# Patient Record
Sex: FEMALE | Race: BLACK OR AFRICAN AMERICAN | Employment: OTHER | URBAN - METROPOLITAN AREA
[De-identification: names, ages, dates, MRNs, and addresses within clinical notes are randomized per-mention and may not be internally consistent; named-entity substitution may affect disease eponyms.]

---

## 2019-09-18 ENCOUNTER — DOCUMENTATION ONLY (OUTPATIENT)
Dept: ONCOLOGY | Age: 64
End: 2019-09-18

## 2019-09-18 ENCOUNTER — OFFICE VISIT (OUTPATIENT)
Dept: ONCOLOGY | Age: 64
End: 2019-09-18

## 2019-09-18 ENCOUNTER — OFFICE VISIT (OUTPATIENT)
Dept: SURGERY | Age: 64
End: 2019-09-18

## 2019-09-18 VITALS
OXYGEN SATURATION: 95 % | WEIGHT: 154 LBS | HEART RATE: 121 BPM | SYSTOLIC BLOOD PRESSURE: 144 MMHG | BODY MASS INDEX: 25.66 KG/M2 | RESPIRATION RATE: 18 BRPM | HEIGHT: 65 IN | DIASTOLIC BLOOD PRESSURE: 88 MMHG

## 2019-09-18 VITALS
RESPIRATION RATE: 16 BRPM | TEMPERATURE: 97 F | OXYGEN SATURATION: 98 % | HEIGHT: 65 IN | SYSTOLIC BLOOD PRESSURE: 119 MMHG | BODY MASS INDEX: 25.66 KG/M2 | WEIGHT: 154 LBS | HEART RATE: 80 BPM | DIASTOLIC BLOOD PRESSURE: 70 MMHG

## 2019-09-18 DIAGNOSIS — M79.89 SOFT TISSUE MASS: Primary | ICD-10-CM

## 2019-09-18 DIAGNOSIS — R22.42 LEG MASS, LEFT: Primary | ICD-10-CM

## 2019-09-18 PROBLEM — R18.8 ASCITES: Status: ACTIVE | Noted: 2019-03-27

## 2019-09-18 PROBLEM — Z99.81 OXYGEN DEPENDENT: Status: ACTIVE | Noted: 2018-12-29

## 2019-09-18 PROBLEM — R74.01 TRANSAMINITIS: Status: ACTIVE | Noted: 2019-02-08

## 2019-09-18 PROBLEM — F10.10 ETOH ABUSE: Status: ACTIVE | Noted: 2019-09-18

## 2019-09-18 PROBLEM — F31.9 BIPOLAR 1 DISORDER (HCC): Status: ACTIVE | Noted: 2019-09-18

## 2019-09-18 PROBLEM — F33.2 MAJOR DEPRESSIVE DISORDER, RECURRENT SEVERE WITHOUT PSYCHOTIC FEATURES (HCC): Status: ACTIVE | Noted: 2018-12-20

## 2019-09-18 PROBLEM — M51.9 LUMBAR DISC DISEASE: Status: ACTIVE | Noted: 2019-09-18

## 2019-09-18 PROBLEM — K76.82 HEPATIC ENCEPHALOPATHY: Status: ACTIVE | Noted: 2019-09-18

## 2019-09-18 PROBLEM — K02.9 TEETH DECAYED: Status: ACTIVE | Noted: 2019-03-27

## 2019-09-18 PROBLEM — F51.04 PSYCHOPHYSIOLOGICAL INSOMNIA: Status: ACTIVE | Noted: 2018-12-20

## 2019-09-18 PROBLEM — B18.2 CHRONIC HEPATITIS C (HCC): Status: ACTIVE | Noted: 2019-09-18

## 2019-09-18 PROBLEM — R45.851 SUICIDAL IDEATION: Status: ACTIVE | Noted: 2018-12-20

## 2019-09-18 PROBLEM — M62.89: Status: ACTIVE | Noted: 2019-02-08

## 2019-09-18 PROBLEM — M15.9 DJD (DEGENERATIVE JOINT DISEASE), MULTIPLE SITES: Status: ACTIVE | Noted: 2019-09-18

## 2019-09-18 PROBLEM — R73.9 ELEVATED BLOOD SUGAR: Status: ACTIVE | Noted: 2019-09-18

## 2019-09-18 PROBLEM — D72.829 LEUKOCYTOSIS: Status: ACTIVE | Noted: 2019-09-18

## 2019-09-18 PROBLEM — J43.9 PULMONARY EMPHYSEMA (HCC): Status: ACTIVE | Noted: 2019-09-18

## 2019-09-18 PROBLEM — R57.8 HEMORRHAGIC SHOCK (HCC): Status: ACTIVE | Noted: 2019-02-08

## 2019-09-18 PROBLEM — F43.10 PTSD (POST-TRAUMATIC STRESS DISORDER): Status: ACTIVE | Noted: 2018-12-20

## 2019-09-18 PROBLEM — I47.1 SVT (SUPRAVENTRICULAR TACHYCARDIA) (HCC): Status: ACTIVE | Noted: 2019-03-27

## 2019-09-18 PROBLEM — K74.60 CIRRHOSIS OF LIVER (HCC): Status: ACTIVE | Noted: 2019-09-18

## 2019-09-18 RX ORDER — BUPRENORPHINE HYDROCHLORIDE, NALOXONE HYDROCHLORIDE 8; 2 MG/1; MG/1
FILM, SOLUBLE BUCCAL; SUBLINGUAL
Refills: 0 | COMMUNITY
Start: 2019-08-29

## 2019-09-18 RX ORDER — LANOLIN ALCOHOL/MO/W.PET/CERES
CREAM (GRAM) TOPICAL DAILY
COMMUNITY

## 2019-09-18 RX ORDER — BUSPIRONE HYDROCHLORIDE 7.5 MG/1
7.5 TABLET ORAL
COMMUNITY
Start: 2019-06-10

## 2019-09-18 RX ORDER — OMEPRAZOLE 40 MG/1
40 CAPSULE, DELAYED RELEASE ORAL DAILY
COMMUNITY

## 2019-09-18 RX ORDER — MONTELUKAST SODIUM 10 MG/1
10 TABLET ORAL DAILY
COMMUNITY

## 2019-09-18 RX ORDER — ALBUTEROL SULFATE 90 UG/1
2 AEROSOL, METERED RESPIRATORY (INHALATION)
COMMUNITY

## 2019-09-18 RX ORDER — SPIRONOLACTONE 25 MG/1
TABLET ORAL DAILY
COMMUNITY

## 2019-09-18 RX ORDER — IBUPROFEN 200 MG
1 TABLET ORAL EVERY 24 HOURS
COMMUNITY
End: 2021-01-01

## 2019-09-18 RX ORDER — NAPROXEN 500 MG/1
500 TABLET ORAL 2 TIMES DAILY WITH MEALS
COMMUNITY

## 2019-09-18 RX ORDER — ERGOCALCIFEROL 1.25 MG/1
CAPSULE ORAL
Refills: 1 | COMMUNITY
Start: 2019-08-24

## 2019-09-18 RX ORDER — HYDROXYZINE PAMOATE 25 MG/1
25 CAPSULE ORAL
COMMUNITY

## 2019-09-18 RX ORDER — BUPROPION HYDROCHLORIDE 150 MG/1
TABLET ORAL
Refills: 3 | COMMUNITY
Start: 2019-09-07

## 2019-09-18 RX ORDER — LACTULOSE 10 G/10G
10 SOLUTION ORAL 3 TIMES DAILY
COMMUNITY

## 2019-09-18 RX ORDER — BUPRENORPHINE AND NALOXONE 8; 2 MG/1; MG/1
1 FILM, SOLUBLE BUCCAL; SUBLINGUAL
COMMUNITY
Start: 2019-02-18

## 2019-09-18 RX ORDER — FOLIC ACID 1 MG/1
TABLET ORAL DAILY
COMMUNITY

## 2019-09-18 NOTE — PROGRESS NOTES
Hematology/Oncology Consultation Note Name: Carroll Lund Date: 2019 : 1955 No primary care provider on file. Ms. Kyle More  is a 75 y.o. -American woman who was referred for evaluation of left gluteal mass. Subjective: Chief complaint: Gluteal mass History of present illness: Ms. Kyle More is a 66-year-old -American woman who states that about a year ago she noticed a small masslike lesion over her left gluteal area. She did not think much of it. However over the past few months it has started to enlarge in size. In 2019 she had an ultrasound of the area and there was no evidence of a cystic component the area was described as heterogeneous measuring about 6.8 x 4.85 8.5 cm. An attempt to aspirate fluid from the left posterior hip/gluteal mass was unsuccessful. More recently she states that the mass has actually doubled in size and is now about the size of a softball. She is referred here for an assessment to help define whether or not we are dealing with a sarcoma versus a benign entity. The area is beginning to cause some pain. The patient reports that she suffers from long-standing COPD and currently receives screening CT scans in an effort to assess for any evidence of lung cancer development. So for she has no evidence of malignancy otherwise. Past Medical History:  
Diagnosis Date  Acid reflux  Anxiety  Bipolar 1 disorder ([address]  Bronchitis  Cirrhosis ([address]  Coughing  Depression  DVT (deep vein thrombosis) in pregnancy ([address]  ETOH abuse  Gastritis  Hepatic encephalopathy ([address]  Hepatitis  Joint pain  Leukocytosis  Lumbar disc disease  Shortness of breath  Stress  Teeth decayed  Vitamin D deficiency  Weakness of left leg Allergies Allergen Reactions  Aspirin Nausea and Vomiting Past Surgical History:  
Procedure Laterality Date  EGD  HAND/FINGER SURGERY UNLISTED  HX BACK SURGERY    
 HX OVARIAN CYST REMOVAL    
 HX SPLENECTOMY Social History Socioeconomic History  Marital status:  Spouse name: Not on file  Number of children: Not on file  Years of education: Not on file  Highest education level: Not on file Occupational History  Not on file Social Needs  Financial resource strain: Not on file  Food insecurity:  
  Worry: Not on file Inability: Not on file  Transportation needs:  
  Medical: Not on file Non-medical: Not on file Tobacco Use  Smoking status: Current Every Day Smoker  Smokeless tobacco: Never Used Substance and Sexual Activity  Alcohol use: Not Currently  Drug use: Yes Types: Cocaine, Heroin  Sexual activity: Yes Lifestyle  Physical activity:  
  Days per week: Not on file Minutes per session: Not on file  Stress: Not on file Relationships  Social connections:  
  Talks on phone: Not on file Gets together: Not on file Attends Jainism service: Not on file Active member of club or organization: Not on file Attends meetings of clubs or organizations: Not on file Relationship status: Not on file  Intimate partner violence:  
  Fear of current or ex partner: Not on file Emotionally abused: Not on file Physically abused: Not on file Forced sexual activity: Not on file Other Topics Concern  Not on file Social History Narrative  Not on file Family History Problem Relation Age of Onset  Heart Disease Mother  Stroke Mother  Heart Disease Father  Heart Disease Brother  Hypertension Brother Current Outpatient Medications Medication Sig Dispense Refill  albuterol (PROVENTIL HFA, VENTOLIN HFA, PROAIR HFA) 90 mcg/actuation inhaler Take 2 Puffs by inhalation.  buprenorphine-naloxone (SUBOXONE) 8-2 mg film sublingaul film 1 Each by SubLINGual route.  SUBOXONE 8-2 mg film sublingaul film DISSOLVE 1 FILM UNDER THE TONGUE TWICE DAILY  0  
 buPROPion XL (WELLBUTRIN XL) 150 mg tablet TAKE 1 TABLET BY MOUTH EVERY DAY  3  
 busPIRone (BUSPAR) 7.5 mg tablet Take 7.5 mg by mouth.  ergocalciferol (ERGOCALCIFEROL) 50,000 unit capsule TAKE 1 CAP BY MOUTH EVERY FRIDAY. 1  
 Omega-3 Fatty Acids (FISH OIL) 500 mg cap Take  by mouth.  folic acid (FOLVITE) 1 mg tablet Take  by mouth daily.  hydrOXYzine pamoate (VISTARIL) 25 mg capsule Take 25 mg by mouth three (3) times daily as needed for Itching.  albuterol 2.5 mg /3 mL (0.083 %) nebu 3 mL, albuterol-ipratropium 2.5 mg-0.5 mg/3 ml nebu 3 mL 1 Dose by Nebulization route.  lactulose (KRISTALOSE) 10 gram packet Take 10 g by mouth three (3) times daily.  montelukast (SINGULAIR) 10 mg tablet Take 10 mg by mouth daily.  naproxen (NAPROSYN) 500 mg tablet Take 500 mg by mouth two (2) times daily (with meals).  nicotine (NICODERM CQ) 21 mg/24 hr 1 Patch by TransDERmal route every twenty-four (24) hours.  omeprazole (PRILOSEC) 40 mg capsule Take 40 mg by mouth daily.  spironolactone (ALDACTONE) 25 mg tablet Take  by mouth daily.  thiamine HCL (VITAMIN B-1) 100 mg tablet Take  by mouth daily.  tiotropium (SPIRIVA WITH HANDIHALER) 18 mcg inhalation capsule Take 1 Cap by inhalation daily. Review of Systems General ROS:The patient has no complaints and there is no physical distress evident. Psychological ROS: patient denies having any psychological symptoms such as hallucinations, depression or anxiety. Ophthalmic ROS:the patient denies having any visual impairment or eye discomfort. ENT ROS: there are no abnormalities reported. Allergy and Immunology ROS:the patient denies having any seasonal allergies or allergies to medications other than those already outlined above.  
Hematological and Lymphatic ROS: the patient denies having any bruising, bleeding or lymphadenopathy. Endocrine ROS: the patient denies having any heat or cold intolerance. There is no history of diabetes or thyroid disorders. Breast ROS: the patient denies having any history of breast mass, nipple discharge, or lumps. Respiratory ROS:the patient denies having any cough, shortness of breath, or dyspnea on exertion. Cardiovascular ROS: there are no complaints of chest pain, palpitations, chest pounding, or dyspnea on exertion. Gastrointestinal ROS: the patient denies having nausea, emesis, diarrhea, constipation, or blood in the stool. Genito-Urinary ROS: the patient denies having urinary urgency, frequency, or dysuria. Musculoskeletal ROS: The patient is complaining of a large soft pulsatile mass on the left posterior hip/gluteal area. Neurological ROS: the patient denies having any numbness, tingling, or neurologic deficits. Dermatological ROS:patient denies having any unexplained rash, skin ulcerations, or hives. Objective:  
 
Visit Vitals /70 Pulse 80 Temp 97 °F (36.1 °C) (Oral) Resp 16 Ht 5' 5\" (1.651 m) Wt 69.9 kg (154 lb) SpO2 98% BMI 25.63 kg/m² ECOGPS=1; pain score=2/10 Physical Exam:  
Gen. Appearance: the patient is in no acute distress. Skin: There is no evidence of bruise or rash. HEENT: The head is normocephalic and atraumatic. The conjunctiva and sclera are clear. Pupils are equal, round, reactive to light, and accommodation. The extraocular movements are intact. ENT reveals no oral mucosal lesions or ulcerations. Neck: Supple without lymphadenopathy or thyromegaly. Lungs: Clear to auscultation and percussion; there are no wheezes or rhonchi. The patient is currently using supplemental oxygen. Heart: Regular rate and rhythm; there are no murmurs, gallops, or rubs. Abdomen:  Bowel sounds are present and normal.  Musculoskeletal: There is a 8 cm x 7 cm soft tissue gluteal area.  There is focal tenderness to firm palpation. There is no guarding, tenderness, or hepatosplenomegaly. Extremities: There is no clubbing, cyanosis, or edema. Neurologic: There are no focal neurologic deficits. Lymphatics: There is no palpable peripheral lymphadenopathy. Lab data: 
From 9/16/2019 and ultrasound of the soft tissue mass of the left gluteal area now measures 6.8 x 4.8 x 8.5 cm. The size has increased since the prior ultrasound. From 4/6/2019 and ultrasound-guided fine-needle aspiration of the left posterior hip/pleural mass was attempted and the test was negative for fluid. CT lung screening low-dose scan was completed on 8/29/2019. There was no suspicious pulmonary nodules/masses. The test was negative. Assessment:  
Soft tissue mass, left gluteal area: I have explained to the patient that this may represent a benign process versus a malignant sarcoma. Plan:  
Soft tissue mass, left gluteal area: At this point I am recommending an MRI of the left gluteal area for further assessment of the mass. She will be referred to the surgeon, Dr. Kana Sanchez, for an assessment to see if he can obtain tissue biopsy for diagnosis. Follow-up in 3 weeks. Orders Placed This Encounter  MRI FEMUR LT W WO CONT Mri of left thigh and left leg Standing Status:   Future Standing Expiration Date:   10/18/2020 Order Specific Question:   Is Patient Allergic to Contrast Dye? Answer:   No  
  Order Specific Question:   STAT Creatinine as indicated Answer: Yes  CBC WITH AUTOMATED DIFF Standing Status:   Future Standing Expiration Date:   9/18/2020  METABOLIC PANEL, COMPREHENSIVE Standing Status:   Future Standing Expiration Date:   9/18/2020  SED RATE (ESR) Standing Status:   Future Standing Expiration Date:   9/18/2020  CBC WITH AUTOMATED DIFF Standing Status:   Future Standing Expiration Date:   9/18/2020  albuterol (PROVENTIL HFA, VENTOLIN HFA, PROAIR HFA) 90 mcg/actuation inhaler Sig: Take 2 Puffs by inhalation.  buprenorphine-naloxone (SUBOXONE) 8-2 mg film sublingaul film Si Each by SubLINGual route. Daylin Varela  SUBOXONE 8-2 mg film sublingaul film Sig: DISSOLVE 1 FILM UNDER THE TONGUE TWICE DAILY Refill:  0  
  .  
 buPROPion XL (WELLBUTRIN XL) 150 mg tablet Sig: TAKE 1 TABLET BY MOUTH EVERY DAY Refill:  3  
 busPIRone (BUSPAR) 7.5 mg tablet Sig: Take 7.5 mg by mouth.  ergocalciferol (ERGOCALCIFEROL) 50,000 unit capsule Sig: TAKE 1 CAP BY MOUTH EVERY FRIDAY. Refill:  1  
 Omega-3 Fatty Acids (FISH OIL) 500 mg cap Sig: Take  by mouth.  folic acid (FOLVITE) 1 mg tablet Sig: Take  by mouth daily.  hydrOXYzine pamoate (VISTARIL) 25 mg capsule Sig: Take 25 mg by mouth three (3) times daily as needed for Itching.  albuterol 2.5 mg /3 mL (0.083 %) nebu 3 mL, albuterol-ipratropium 2.5 mg-0.5 mg/3 ml nebu 3 mL Si Dose by Nebulization route.  lactulose (KRISTALOSE) 10 gram packet Sig: Take 10 g by mouth three (3) times daily.  montelukast (SINGULAIR) 10 mg tablet Sig: Take 10 mg by mouth daily.  naproxen (NAPROSYN) 500 mg tablet Sig: Take 500 mg by mouth two (2) times daily (with meals).  nicotine (NICODERM CQ) 21 mg/24 hr  
  Si Patch by TransDERmal route every twenty-four (24) hours.  omeprazole (PRILOSEC) 40 mg capsule Sig: Take 40 mg by mouth daily.  spironolactone (ALDACTONE) 25 mg tablet Sig: Take  by mouth daily.  thiamine HCL (VITAMIN B-1) 100 mg tablet Sig: Take  by mouth daily.  tiotropium (SPIRIVA WITH HANDIHALER) 18 mcg inhalation capsule Sig: Take 1 Cap by inhalation daily. Mikala Reyes MD 
2019 Please note: This document has been produced using voice recognition software. Unrecognized errors in transcription may be present.

## 2019-09-18 NOTE — PATIENT INSTRUCTIONS
Sarcoma: Care Instructions Your Care Instructions Sarcoma is cancer of certain tissues of the body, such as the muscles, connective tissues (like tendons), blood vessels, bones, and fat. Cancer occurs when abnormal cells grow out of control. The cells can spread to other areas of the body. Sarcoma is a general name for several rare cancers that occur in these tissues. Doctors treat this type of cancer with surgery, radiation, or medicines (chemotherapy). Your doctor will treat you based on how quickly or slowly the type of cancer you have may spread, how far the cancer has spread, and your overall health. One or more treatments may be used. When you find out that you have cancer, you may feel many emotions and may need some help coping. Seek out family, friends, and counselors for support. You also can do things at home to make yourself feel better while you go through treatment. Call the [facility] (9-781.675.1978) or visit its website at [employer] for more information. Follow-up care is a key part of your treatment and safety. Be sure to make and go to all appointments, and call your doctor if you are having problems. It's also a good idea to know your test results and keep a list of the medicines you take. How can you care for yourself at home? · Take your medicines exactly as prescribed. Call your doctor if you think you are having a problem with your medicine. You may get medicine for nausea and vomiting if you have these side effects. · Eat healthy food. If you do not feel like eating, try to eat food that has protein and extra calories to keep up your strength and prevent weight loss. Drink liquid meal replacements for extra calories and protein. Try to eat your main meal early. · Get some physical activity every day, but do not get too tired. Keep doing the hobbies you enjoy as your energy allows. · Take steps to control your stress and workload. Learn relaxation techniques. ? Share your feelings. Stress and tension affect our emotions. By expressing your feelings to others, you may be able to understand and cope with them. ? Consider joining a support group. Talking about a problem with your spouse, a good friend, or other people with similar problems is a good way to reduce tension and stress. ? Express yourself through art. Try writing, crafts, dance, or art to relieve stress. Some dance, writing, or art groups may be available just for people who have cancer. ? Be kind to your body and mind. Getting enough sleep, eating a healthy diet, and taking time to do things you enjoy can contribute to an overall feeling of balance in your life and help reduce stress. ? Get help if you need it. Discuss your concerns with your doctor or counselor. · If you are vomiting or have diarrhea: ? Drink plenty of fluids (enough so that your urine is light yellow or clear like water) to prevent dehydration. Choose water and other caffeine-free clear liquids. If you have kidney, heart, or liver disease and have to limit fluids, talk with your doctor before you increase the amount of fluids you drink. ? When you are able to eat, try clear soups, mild foods, and liquids until all symptoms are gone for 12 to 48 hours. Other good choices include dry toast, crackers, cooked cereal, and gelatin dessert, such as Jell-O. · If you have not already done so, prepare a list of advance directives. Advance directives are instructions to your doctor and family members about what kind of care you want if you become unable to speak or express yourself. When should you call for help? Call 911 anytime you think you may need emergency care. For example, call if: 
  · You passed out (lost consciousness).  
 Call your doctor now or seek immediate medical care if: 
  · You have a fever.  
  · You have abnormal bleeding.  
  · You have new or worse pain.  
  · You think you have an infection.   · You have new symptoms, such as a cough, belly pain, vomiting, diarrhea, or a rash.  
 Watch closely for changes in your health, and be sure to contact your doctor if: 
  · You are much more tired than usual.  
  · You have swollen glands in your armpits, groin, or neck.  
  · You do not get better as expected. Where can you learn more? Go to http://laura-lucia.info/. Enter Reina Zaragoza in the search box to learn more about \"Sarcoma: Care Instructions. \" Current as of: December 19, 2018 Content Version: 12.1 © 0231-4999 [employer]. Care instructions adapted under license by [employer] (which disclaims liability or warranty for this information). If you have questions about a medical condition or this instruction, always ask your healthcare professional. [address] any warranty or liability for your use of this information.

## 2019-09-18 NOTE — PROGRESS NOTES
Progress Note    Patient: Lynn Mena  MRN: [id number]  SSN: [id number]   YOB: 1955  Age: 75 y.o. Sex: female     Chief Complaint   Patient presents with    Mass     left leg       HPI    Ms. Rick Wall is a 20-year-old significantly ill black woman with history of Bipolar disorder hepatitis C severe COPD requiring home oxygen and hepatic cirrhosis. She is here because a moderate to large size left gluteal mass was found. No imaging was done so far on this and she needs that prior to any surgical advice. She is short of breath in the exam room and has to plug her home oxygen and to get relief. To me this makes her essentially a nonsurgical candidate. She is due for an MRI ordered by Dr. Zbigniew Frias her medical oncologist.  We will see what this shows and see her back at that time so I could advise her on the best course of action. Past Medical History:   Diagnosis Date    Acid reflux     Anxiety     Bipolar 1 disorder (HCC)     Bronchitis     Cirrhosis (HCC)     Coughing     Depression     DVT (deep vein thrombosis) in pregnancy (HCC)     ETOH abuse     Gastritis     Hepatic encephalopathy (HCC)     Hepatitis     Joint pain     Leukocytosis     Lumbar disc disease     Shortness of breath     Stress     Teeth decayed     Vitamin D deficiency     Weakness of left leg      Past Surgical History:   Procedure Laterality Date    EGD      HAND/FINGER SURGERY UNLISTED      HX BACK SURGERY      HX OVARIAN CYST REMOVAL      HX SPLENECTOMY       Allergies   Allergen Reactions    Aspirin Nausea and Vomiting     Current Outpatient Medications   Medication Sig Dispense Refill    albuterol (PROVENTIL HFA, VENTOLIN HFA, PROAIR HFA) 90 mcg/actuation inhaler Take 2 Puffs by inhalation.  buprenorphine-naloxone (SUBOXONE) 8-2 mg film sublingaul film 1 Each by SubLINGual route.       SUBOXONE 8-2 mg film sublingaul film DISSOLVE 1 FILM UNDER THE TONGUE TWICE DAILY  0    buPROPion XL (WELLBUTRIN XL) 150 mg tablet TAKE 1 TABLET BY MOUTH EVERY DAY  3    busPIRone (BUSPAR) 7.5 mg tablet Take 7.5 mg by mouth.  ergocalciferol (ERGOCALCIFEROL) 50,000 unit capsule TAKE 1 CAP BY MOUTH EVERY FRIDAY. 1    Omega-3 Fatty Acids (FISH OIL) 500 mg cap Take  by mouth.  folic acid (FOLVITE) 1 mg tablet Take  by mouth daily.  hydrOXYzine pamoate (VISTARIL) 25 mg capsule Take 25 mg by mouth three (3) times daily as needed for Itching.  albuterol 2.5 mg /3 mL (0.083 %) nebu 3 mL, albuterol-ipratropium 2.5 mg-0.5 mg/3 ml nebu 3 mL 1 Dose by Nebulization route.  lactulose (KRISTALOSE) 10 gram packet Take 10 g by mouth three (3) times daily.  montelukast (SINGULAIR) 10 mg tablet Take 10 mg by mouth daily.  naproxen (NAPROSYN) 500 mg tablet Take 500 mg by mouth two (2) times daily (with meals).  nicotine (NICODERM CQ) 21 mg/24 hr 1 Patch by TransDERmal route every twenty-four (24) hours.  omeprazole (PRILOSEC) 40 mg capsule Take 40 mg by mouth daily.  spironolactone (ALDACTONE) 25 mg tablet Take  by mouth daily.  thiamine HCL (VITAMIN B-1) 100 mg tablet Take  by mouth daily.  tiotropium (SPIRIVA WITH HANDIHALER) 18 mcg inhalation capsule Take 1 Cap by inhalation daily.        Social History     Socioeconomic History    Marital status:      Spouse name: Not on file    Number of children: Not on file    Years of education: Not on file    Highest education level: Not on file   Occupational History    Not on file   Social Needs    Financial resource strain: Not on file    Food insecurity:     Worry: Not on file     Inability: Not on file    Transportation needs:     Medical: Not on file     Non-medical: Not on file   Tobacco Use    Smoking status: Current Every Day Smoker    Smokeless tobacco: Never Used   Substance and Sexual Activity    Alcohol use: Not Currently    Drug use: Yes     Types: Cocaine, Heroin    Sexual activity: Yes Lifestyle    Physical activity:     Days per week: Not on file     Minutes per session: Not on file    Stress: Not on file   Relationships    Social connections:     Talks on phone: Not on file     Gets together: Not on file     Attends Muslim service: Not on file     Active member of club or organization: Not on file     Attends meetings of clubs or organizations: Not on file     Relationship status: Not on file    Intimate partner violence:     Fear of current or ex partner: Not on file     Emotionally abused: Not on file     Physically abused: Not on file     Forced sexual activity: Not on file   Other Topics Concern    Not on file   Social History Narrative    Not on file     Family History   Problem Relation Age of Onset    Heart Disease Mother     Stroke Mother     Heart Disease Father     Heart Disease Brother     Hypertension Brother          Review of systems:  Patient denies any reflux, emesis, abdominal pain, change in bowel habits, hematochezia, melena, fever, weight loss, fatigue chills, dermatitis, abnormal moles, change in vision, vertigo, epistaxis, dysphagia, hoarseness, chest pain, palpitations, hypertension, edema, cough, shortness of breath, wheezing, hemoptysis, snoring, hematuria, diabetes, thyroid disease, anemia, bruising, history of blood transfusion, dizziness, headache, or fainting.     Physical Examination    Well developed well nourished female in no apparent distress  Visit Vitals  /88   Pulse (!) 121   Resp 18   Ht 5' 5\" (1.651 m)   Wt 69.9 kg (154 lb)   SpO2 95%   BMI 25.63 kg/m²      Head: normocephalic, atraumatic  Mouth: Clear, no overt lesions, oral mucosa pink and moist  Neck: supple, no masses, no adenopathy or carotid bruits, trachea midline  Resp: clear to auscultation bilaterally, no wheeze, rhonchi or rales, excursions normal and symmetrical  Cardio: Regular rate and rhythm, no murmurs, clicks, gallops or rubs, no edema or varicosities  Abdomen: soft, nontender, nondistended, normoactive bowel sounds, no hernias, no hepatosplenomegaly,   Back: Fairly large 6 x 8 cm mass around her left hip and buttock area that is palpable little deep. Extremeties: warm, well-perfused, no tenderness or swelling, normal gait/station  Neuro: sensation and strength grossly intact and symmetrical  Psych: alert and oriented to person, place and time  Breast exam deferred    IMPRESSION  Highly suspicious left pelvic/buttock mass awaiting MRI    PLAN  No orders of the defined types were placed in this encounter.     Follow-up after MRI  August Metzger MD

## 2019-09-19 LAB
ALBUMIN SERPL-MCNC: 3.3 G/DL (ref 3.6–4.8)
ALBUMIN/GLOB SERPL: 0.6 {RATIO} (ref 1.2–2.2)
ALP SERPL-CCNC: 177 IU/L (ref 39–117)
ALT SERPL-CCNC: 17 IU/L (ref 0–32)
AST SERPL-CCNC: 30 IU/L (ref 0–40)
BASOPHILS # BLD AUTO: 0.1 X10E3/UL (ref 0–0.2)
BASOPHILS NFR BLD AUTO: 1 %
BILIRUB SERPL-MCNC: 0.7 MG/DL (ref 0–1.2)
BUN SERPL-MCNC: 5 MG/DL (ref 8–27)
BUN/CREAT SERPL: 8 (ref 12–28)
CALCIUM SERPL-MCNC: 9.6 MG/DL (ref 8.7–10.3)
CHLORIDE SERPL-SCNC: 93 MMOL/L (ref 96–106)
CO2 SERPL-SCNC: 26 MMOL/L (ref 20–29)
CREAT SERPL-MCNC: 0.66 MG/DL (ref 0.57–1)
EOSINOPHIL # BLD AUTO: 0.7 X10E3/UL (ref 0–0.4)
EOSINOPHIL NFR BLD AUTO: 5 %
ERYTHROCYTE [DISTWIDTH] IN BLOOD BY AUTOMATED COUNT: 13.8 % (ref 12.3–15.4)
ERYTHROCYTE [SEDIMENTATION RATE] IN BLOOD BY WESTERGREN METHOD: 55 MM/HR (ref 0–40)
GLOBULIN SER CALC-MCNC: 5.5 G/DL (ref 1.5–4.5)
GLUCOSE SERPL-MCNC: 106 MG/DL (ref 65–99)
HCT VFR BLD AUTO: 40.7 % (ref 34–46.6)
HGB BLD-MCNC: 14.1 G/DL (ref 11.1–15.9)
IMM GRANULOCYTES # BLD AUTO: 0 X10E3/UL (ref 0–0.1)
IMM GRANULOCYTES NFR BLD AUTO: 0 %
LYMPHOCYTES # BLD AUTO: 2.7 X10E3/UL (ref 0.7–3.1)
LYMPHOCYTES NFR BLD AUTO: 21 %
MCH RBC QN AUTO: 31.3 PG (ref 26.6–33)
MCHC RBC AUTO-ENTMCNC: 34.6 G/DL (ref 31.5–35.7)
MCV RBC AUTO: 90 FL (ref 79–97)
MONOCYTES # BLD AUTO: 0.9 X10E3/UL (ref 0.1–0.9)
MONOCYTES NFR BLD AUTO: 7 %
NEUTROPHILS # BLD AUTO: 8.6 X10E3/UL (ref 1.4–7)
NEUTROPHILS NFR BLD AUTO: 66 %
PLATELET # BLD AUTO: 759 X10E3/UL (ref 150–450)
POTASSIUM SERPL-SCNC: 4.7 MMOL/L (ref 3.5–5.2)
PROT SERPL-MCNC: 8.8 G/DL (ref 6–8.5)
RBC # BLD AUTO: 4.5 X10E6/UL (ref 3.77–5.28)
SODIUM SERPL-SCNC: 134 MMOL/L (ref 134–144)
WBC # BLD AUTO: 12.8 X10E3/UL (ref 3.4–10.8)

## 2019-09-27 ENCOUNTER — HOSPITAL ENCOUNTER (OUTPATIENT)
Age: 64
Discharge: HOME | End: 2019-09-27
Attending: INTERNAL MEDICINE
Payer: MEDICARE

## 2019-09-27 DIAGNOSIS — R22.42 LEG MASS, LEFT: ICD-10-CM

## 2019-09-27 PROCEDURE — A9575 INJ GADOTERATE MEGLUMI 0.1ML: HCPCS | Performed by: INTERNAL MEDICINE

## 2019-09-27 PROCEDURE — 73720 MRI LWR EXTREMITY W/O&W/DYE: CPT

## 2019-09-27 PROCEDURE — 74011636320 HC RX REV CODE- 636/320: Performed by: INTERNAL MEDICINE

## 2019-09-27 RX ADMIN — GADOTERATE MEGLUMINE 15 ML: 376.9 INJECTION INTRAVENOUS at 12:00

## 2019-10-09 ENCOUNTER — OFFICE VISIT (OUTPATIENT)
Dept: ONCOLOGY | Age: 64
End: 2019-10-09

## 2019-10-09 VITALS
DIASTOLIC BLOOD PRESSURE: 81 MMHG | RESPIRATION RATE: 16 BRPM | WEIGHT: 160 LBS | OXYGEN SATURATION: 94 % | BODY MASS INDEX: 26.66 KG/M2 | HEART RATE: 89 BPM | HEIGHT: 65 IN | SYSTOLIC BLOOD PRESSURE: 118 MMHG

## 2019-10-09 DIAGNOSIS — R22.42 LEG MASS, LEFT: Primary | ICD-10-CM

## 2019-10-09 NOTE — PROGRESS NOTES
Hematology/medical oncology progress note    10/9/2019  Ryan Villarreal  YOB: 1955    PCP: Dr. Екатерина Smith    Diagnosis: Large heterogeneous enhancing mass in the left lateral gluteus esther muscle:    I have explained to Ms. Jadon Villarreal that the MRI of her left lower extremity shows that the large left mass in the gluteal area measures 9.5 x 8.9 x 6.1 cm. The mass has complex internal features. There was no evidence of bony involvement in the sciatic nerve revealed no evidence of impingement. The femur remains intact. 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 The patient has a follow-up visit with the surgeon next week and I will wait to see if he will be able to obtain tissue for biopsy since different soft tissue sarcomas require different therapeutic intervention with regards to systemic chemotherapy. If we are not able to obtain a tissue diagnosis here then I will consider referring her to the orthopedic oncologist at [facility] in [place]. I will see her back in 2 weeks to discuss options of management in more detail, pending the results of her follow-up with Dr. Anna Rodriguez. Total time 25 minutes, greater than 50% of the time was in counseling and coordination of care. Bony Noble MD, Santiago Petty

## 2019-10-14 ENCOUNTER — OFFICE VISIT (OUTPATIENT)
Dept: SURGERY | Age: 64
End: 2019-10-14

## 2019-10-14 VITALS
HEIGHT: 65 IN | HEART RATE: 75 BPM | SYSTOLIC BLOOD PRESSURE: 123 MMHG | RESPIRATION RATE: 16 BRPM | OXYGEN SATURATION: 94 % | WEIGHT: 154 LBS | BODY MASS INDEX: 25.66 KG/M2 | DIASTOLIC BLOOD PRESSURE: 79 MMHG

## 2019-10-14 DIAGNOSIS — M79.89 SOFT TISSUE MASS: Primary | ICD-10-CM

## 2019-10-14 NOTE — PROGRESS NOTES
Progress Note    Patient: Madi January  MRN: [id number]  SSN: [id number]   YOB: 1955  Age: 75 y.o. Sex: female     Chief Complaint   Patient presents with    Follow-up     MRI       HPI    Ms. Puja Harris is a 76-year-old woman I saw few weeks ago for a large 9.8 x 8.9 x 6.1 cm left buttock mass. This was thought to be a sarcoma on MRI. She has multiple problems including alcohol dependence, cirrhosis, severe COPD with oxygen dependence and chronic hepatitis C. She also claims to have some gastric ulcers. Her mass is easily palpable and quite large. The real question is how to get a piece of it without seeding a tract for possible treatment with radiation therapy. I do not believe she is a current operative candidate for this due to her multiple health considerations. Past Medical History:   Diagnosis Date    Acid reflux     Anxiety     Bipolar 1 disorder (HCC)     Bronchitis     Cirrhosis (HCC)     Coughing     Depression     DVT (deep vein thrombosis) in pregnancy     ETOH abuse     Gastritis     Hepatic encephalopathy (HCC)     Hepatitis     Joint pain     Leukocytosis     Lumbar disc disease     Shortness of breath     Stress     Teeth decayed     Vitamin D deficiency     Weakness of left leg      Past Surgical History:   Procedure Laterality Date    EGD      HAND/FINGER SURGERY UNLISTED      HX BACK SURGERY      HX OVARIAN CYST REMOVAL      HX SPLENECTOMY       Allergies   Allergen Reactions    Aspirin Nausea and Vomiting     Current Outpatient Medications   Medication Sig Dispense Refill    albuterol (PROVENTIL HFA, VENTOLIN HFA, PROAIR HFA) 90 mcg/actuation inhaler Take 2 Puffs by inhalation.  buprenorphine-naloxone (SUBOXONE) 8-2 mg film sublingaul film 1 Each by SubLINGual route.       SUBOXONE 8-2 mg film sublingaul film DISSOLVE 1 FILM UNDER THE TONGUE TWICE DAILY  0    buPROPion XL (WELLBUTRIN XL) 150 mg tablet TAKE 1 TABLET BY MOUTH EVERY DAY  3  busPIRone (BUSPAR) 7.5 mg tablet Take 7.5 mg by mouth.  ergocalciferol (ERGOCALCIFEROL) 50,000 unit capsule TAKE 1 CAP BY MOUTH EVERY FRIDAY. 1    Omega-3 Fatty Acids (FISH OIL) 500 mg cap Take  by mouth.  folic acid (FOLVITE) 1 mg tablet Take  by mouth daily.  hydrOXYzine pamoate (VISTARIL) 25 mg capsule Take 25 mg by mouth three (3) times daily as needed for Itching.  albuterol 2.5 mg /3 mL (0.083 %) nebu 3 mL, albuterol-ipratropium 2.5 mg-0.5 mg/3 ml nebu 3 mL 1 Dose by Nebulization route.  lactulose (KRISTALOSE) 10 gram packet Take 10 g by mouth three (3) times daily.  montelukast (SINGULAIR) 10 mg tablet Take 10 mg by mouth daily.  naproxen (NAPROSYN) 500 mg tablet Take 500 mg by mouth two (2) times daily (with meals).  nicotine (NICODERM CQ) 21 mg/24 hr 1 Patch by TransDERmal route every twenty-four (24) hours.  omeprazole (PRILOSEC) 40 mg capsule Take 40 mg by mouth daily.  spironolactone (ALDACTONE) 25 mg tablet Take  by mouth daily.  thiamine HCL (VITAMIN B-1) 100 mg tablet Take  by mouth daily.  tiotropium (SPIRIVA WITH HANDIHALER) 18 mcg inhalation capsule Take 1 Cap by inhalation daily.        Social History     Socioeconomic History    Marital status:      Spouse name: Not on file    Number of children: Not on file    Years of education: Not on file    Highest education level: Not on file   Occupational History    Not on file   Social Needs    Financial resource strain: Not on file    Food insecurity:     Worry: Not on file     Inability: Not on file    Transportation needs:     Medical: Not on file     Non-medical: Not on file   Tobacco Use    Smoking status: Current Every Day Smoker    Smokeless tobacco: Never Used   Substance and Sexual Activity    Alcohol use: Not Currently    Drug use: Yes     Types: Cocaine, Heroin    Sexual activity: Yes   Lifestyle    Physical activity:     Days per week: Not on file Minutes per session: Not on file    Stress: Not on file   Relationships    Social connections:     Talks on phone: Not on file     Gets together: Not on file     Attends Synagogue service: Not on file     Active member of club or organization: Not on file     Attends meetings of clubs or organizations: Not on file     Relationship status: Not on file    Intimate partner violence:     Fear of current or ex partner: Not on file     Emotionally abused: Not on file     Physically abused: Not on file     Forced sexual activity: Not on file   Other Topics Concern    Not on file   Social History Narrative    Not on file     Family History   Problem Relation Age of Onset    Heart Disease Mother     Stroke Mother     Heart Disease Father     Heart Disease Brother     Hypertension Brother          Review of systems:  Patient denies any reflux, emesis, abdominal pain, change in bowel habits, hematochezia, melena, fever, weight loss, fatigue chills, dermatitis, abnormal moles, change in vision, vertigo, epistaxis, dysphagia, hoarseness, chest pain, palpitations, hypertension, edema, cough, shortness of breath, wheezing, hemoptysis, snoring, hematuria, diabetes, thyroid disease, anemia, bruising, history of blood transfusion, dizziness, headache, or fainting.     Physical Examination    Well developed well nourished female in no apparent distress  Visit Vitals  /79   Pulse 75   Resp 16   Ht 5' 5\" (1.651 m)   Wt 69.9 kg (154 lb)   SpO2 94%   BMI 25.63 kg/m²      Head: normocephalic, atraumatic  Mouth: Clear, no overt lesions, oral mucosa pink and moist  Neck: supple, no masses, no adenopathy or carotid bruits, trachea midline  Resp: clear to auscultation bilaterally, no wheeze, rhonchi or rales, excursions normal and symmetrical  Cardio: Regular rate and rhythm, no murmurs, clicks, gallops or rubs, no edema or varicosities  Abdomen: soft, nontender, nondistended, normoactive bowel sounds, no hernias, no hepatosplenomegaly,   Back: Deferred  Extremeties: warm, well-perfused, no tenderness or swelling, normal gait/station mass as described above  Neuro: sensation and strength grossly intact and symmetrical  Psych: alert and oriented to person, place and time  Breast exam deferred    IMPRESSION  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     Discuss with radiology  Ana Bradley MD

## 2019-10-23 ENCOUNTER — TELEPHONE (OUTPATIENT)
Dept: SURGERY | Age: 64
End: 2019-10-23

## 2019-10-23 NOTE — TELEPHONE ENCOUNTER
Attempted to reach pt regarding scheduling an upcoming biopsy of mass on leg. LVM for pt to call office.

## 2019-10-30 ENCOUNTER — OFFICE VISIT (OUTPATIENT)
Dept: ONCOLOGY | Age: 64
End: 2019-10-30

## 2019-10-30 VITALS
DIASTOLIC BLOOD PRESSURE: 81 MMHG | SYSTOLIC BLOOD PRESSURE: 122 MMHG | BODY MASS INDEX: 27.49 KG/M2 | OXYGEN SATURATION: 92 % | HEIGHT: 65 IN | WEIGHT: 165 LBS | HEART RATE: 88 BPM | RESPIRATION RATE: 18 BRPM

## 2019-10-30 DIAGNOSIS — R22.42 LEG MASS, LEFT: Primary | ICD-10-CM

## 2019-11-06 ENCOUNTER — DOCUMENTATION ONLY (OUTPATIENT)
Dept: ONCOLOGY | Age: 64
End: 2019-11-06

## 2019-11-06 NOTE — PROGRESS NOTES
11/04/2019 Message from [facility]. Argenis Carrillo [phone] wanting to know if Dr. Jannie De La Cruz has reached out to Dr. Mary Anne Davila office yet?

## 2019-11-07 ENCOUNTER — DOCUMENTATION ONLY (OUTPATIENT)
Dept: ONCOLOGY | Age: 64
End: 2019-11-07

## 2019-11-07 NOTE — PROGRESS NOTES
Returned Ms. Harley [address] phone call and spoke with her regarding Ms. Bolton's upcoming appt with Dr. Glendy Dickinson.  She stated that she and the pt are aware of the appt on Thursday 11/14/19 at 7 am.

## 2019-11-20 ENCOUNTER — HOSPITAL ENCOUNTER (OUTPATIENT)
Dept: CT IMAGING | Age: 64
Discharge: HOME | End: 2019-11-20
Attending: RADIOLOGY | Admitting: RADIOLOGY
Payer: MEDICARE

## 2019-11-20 VITALS
DIASTOLIC BLOOD PRESSURE: 82 MMHG | OXYGEN SATURATION: 96 % | SYSTOLIC BLOOD PRESSURE: 118 MMHG | WEIGHT: 163.2 LBS | TEMPERATURE: 97 F | BODY MASS INDEX: 27.19 KG/M2 | RESPIRATION RATE: 14 BRPM | HEART RATE: 96 BPM | HEIGHT: 65 IN

## 2019-11-20 DIAGNOSIS — M79.89 SOFT TISSUE MASS: ICD-10-CM

## 2019-11-20 LAB
ANION GAP SERPL CALC-SCNC: 2 MMOL/L (ref 3–18)
APTT PPP: 26.8 SEC (ref 23–36.4)
BUN SERPL-MCNC: 10 MG/DL (ref 7–18)
BUN/CREAT SERPL: 13 (ref 12–20)
CALCIUM SERPL-MCNC: 8.6 MG/DL (ref 8.5–10.1)
CHLORIDE SERPL-SCNC: 102 MMOL/L (ref 100–111)
CO2 SERPL-SCNC: 37 MMOL/L (ref 21–32)
CREAT SERPL-MCNC: 0.79 MG/DL (ref 0.6–1.3)
ERYTHROCYTE [DISTWIDTH] IN BLOOD BY AUTOMATED COUNT: 14 % (ref 11.6–14.5)
GLUCOSE SERPL-MCNC: 98 MG/DL (ref 74–99)
HCT VFR BLD AUTO: 36.3 % (ref 35–45)
HGB BLD-MCNC: 13 G/DL (ref 12–16)
INR PPP: 1 (ref 0.8–1.2)
MCH RBC QN AUTO: 32 PG (ref 24–34)
MCHC RBC AUTO-ENTMCNC: 35.8 G/DL (ref 31–37)
MCV RBC AUTO: 89.4 FL (ref 74–97)
PLATELET # BLD AUTO: 508 K/UL (ref 135–420)
PMV BLD AUTO: 8.1 FL (ref 9.2–11.8)
POTASSIUM SERPL-SCNC: 4.3 MMOL/L (ref 3.5–5.5)
PROTHROMBIN TIME: 13.3 SEC (ref 11.5–15.2)
RBC # BLD AUTO: 4.06 M/UL (ref 4.2–5.3)
SODIUM SERPL-SCNC: 141 MMOL/L (ref 136–145)
WBC # BLD AUTO: 18.7 K/UL (ref 4.6–13.2)

## 2019-11-20 PROCEDURE — 88341 IMHCHEM/IMCYTCHM EA ADD ANTB: CPT

## 2019-11-20 PROCEDURE — 85730 THROMBOPLASTIN TIME PARTIAL: CPT

## 2019-11-20 PROCEDURE — 88305 TISSUE EXAM BY PATHOLOGIST: CPT

## 2019-11-20 PROCEDURE — 88360 TUMOR IMMUNOHISTOCHEM/MANUAL: CPT

## 2019-11-20 PROCEDURE — 88342 IMHCHEM/IMCYTCHM 1ST ANTB: CPT

## 2019-11-20 PROCEDURE — 88307 TISSUE EXAM BY PATHOLOGIST: CPT

## 2019-11-20 PROCEDURE — 49180 BIOPSY ABDOMINAL MASS: CPT

## 2019-11-20 PROCEDURE — 85027 COMPLETE CBC AUTOMATED: CPT

## 2019-11-20 PROCEDURE — 88333 PATH CONSLTJ SURG CYTO XM 1: CPT

## 2019-11-20 PROCEDURE — 80048 BASIC METABOLIC PNL TOTAL CA: CPT

## 2019-11-20 PROCEDURE — 85610 PROTHROMBIN TIME: CPT

## 2019-11-20 PROCEDURE — 74011250636 HC RX REV CODE- 250/636: Performed by: RADIOLOGY

## 2019-11-20 RX ORDER — SODIUM CHLORIDE 9 MG/ML
20 INJECTION, SOLUTION INTRAVENOUS CONTINUOUS
Status: DISCONTINUED | OUTPATIENT
Start: 2019-11-20 | End: 2019-11-20 | Stop reason: HOSPADM

## 2019-11-20 RX ORDER — FENTANYL CITRATE 50 UG/ML
12.5-5 INJECTION, SOLUTION INTRAMUSCULAR; INTRAVENOUS
Status: DISCONTINUED | OUTPATIENT
Start: 2019-11-20 | End: 2019-11-20 | Stop reason: HOSPADM

## 2019-11-20 RX ORDER — MIDAZOLAM HYDROCHLORIDE 1 MG/ML
1 INJECTION, SOLUTION INTRAMUSCULAR; INTRAVENOUS
Status: DISCONTINUED | OUTPATIENT
Start: 2019-11-20 | End: 2019-11-20 | Stop reason: HOSPADM

## 2019-11-20 RX ADMIN — MIDAZOLAM 1 MG: 1 INJECTION INTRAMUSCULAR; INTRAVENOUS at 14:35

## 2019-11-20 RX ADMIN — FENTANYL CITRATE 50 MCG: 50 INJECTION, SOLUTION INTRAMUSCULAR; INTRAVENOUS at 14:30

## 2019-11-20 RX ADMIN — FENTANYL CITRATE 50 MCG: 50 INJECTION, SOLUTION INTRAMUSCULAR; INTRAVENOUS at 14:35

## 2019-11-20 RX ADMIN — MIDAZOLAM 1 MG: 1 INJECTION INTRAMUSCULAR; INTRAVENOUS at 14:30

## 2019-11-20 RX ADMIN — SODIUM CHLORIDE 20 ML/HR: 900 INJECTION, SOLUTION INTRAVENOUS at 10:51

## 2019-11-20 NOTE — DISCHARGE INSTRUCTIONS
[facility] INSTRUCTIONS    General Instructions:     A biopsy is the removal of a small piece of tissue for microscopic examination or testing. Healthy tissue can be obtained for the purpose of tissue-type matching for transplants. Unhealthy tissues are more commonly biopsied to diagnose disease. Home Care Instructions: You may resume your regular diet and medication regimen. Do not drink alcohol, drive, or make any important legal decisions in the next 24 hours. Do not lift anything heavier than a gallon of milk until the soreness goes away. You may use over the counter acetaminophen or ibuprofen for the soreness. You may apply an ice pack to the affected area for 20-30 minutes at time for the first 24 hours. After that, you may apply a heat pack. Call If: You should call your Physician and/or the Radiology Nurse if you have any questions or concerns about the biopsy site. Call if you should have increased pain, fever, redness, drainage, or bleeding more than a small spot on the bandage. Follow-Up Instructions: Please see your ordering doctor as he/she has requested. DISCHARGE SUMMARY from Nurse    PATIENT INSTRUCTIONS:    After general anesthesia or intravenous sedation, for 24 hours or while taking prescription Narcotics:  · Limit your activities  · Do not drive and operate hazardous machinery  · Do not make important personal or business decisions  · Do  not drink alcoholic beverages  · If you have not urinated within 8 hours after discharge, please contact your surgeon on call.     Report the following to your surgeon:  · Excessive pain, swelling, redness or odor of or around the surgical area  · Temperature over 100.5  · Nausea and vomiting lasting longer than 4 hours or if unable to take medications  · Any signs of decreased circulation or nerve impairment to extremity: change in color, persistent  numbness, tingling, coldness or increase pain  · Any questions    What to do at Home:  Recommended activity: Activity as tolerated and no driving for today. *  Please give a list of your current medications to your Primary Care Provider. *  Please update this list whenever your medications are discontinued, doses are      changed, or new medications (including over-the-counter products) are added. *  Please carry medication information at all times in case of emergency situations. These are general instructions for a healthy lifestyle:    No smoking/ No tobacco products/ Avoid exposure to second hand smoke  Surgeon General's Warning:  Quitting smoking now greatly reduces serious risk to your health. Obesity, smoking, and sedentary lifestyle greatly increases your risk for illness    A healthy diet, regular physical exercise & weight monitoring are important for maintaining a healthy lifestyle    You may be retaining fluid if you have a history of heart failure or if you experience any of the following symptoms:  Weight gain of 3 pounds or more overnight or 5 pounds in a week, increased swelling in our hands or feet or shortness of breath while lying flat in bed. Please call your doctor as soon as you notice any of these symptoms; do not wait until your next office visit. The discharge information has been reviewed with the patient. The patient verbalized understanding. Discharge medications reviewed with the patient and appropriate educational materials and side effects teaching were provided.   ___________________________________________________________________________________________________________________________________

## 2019-11-20 NOTE — H&P
OUTPATIENT HISTORY AND PHYSICAL      Today 11/20/2019     Indication/Symptoms:   Josué Gomes is a 75 y.o. female with a large left gluteal muscle mass who presents for an image-guided left gluteal mass biopsy with moderate sedation. Patient has dulce NPO since midnight and takes no blood thinning medications. Current Meds:    Prior to Admission medications    Medication Sig Start Date End Date Taking? Authorizing Provider   buprenorphine-naloxone (SUBOXONE) 8-2 mg film sublingaul film 1 Each by SubLINGual route. 2/18/19  Yes Provider, Historical   SUBOXONE 8-2 mg film sublingaul film DISSOLVE 1 FILM UNDER THE TONGUE TWICE DAILY 8/29/19  Yes Provider, Historical   buPROPion XL (WELLBUTRIN XL) 150 mg tablet TAKE 1 TABLET BY MOUTH EVERY DAY 9/7/19  Yes Provider, Historical   busPIRone (BUSPAR) 7.5 mg tablet Take 7.5 mg by mouth. 6/10/19  Yes Provider, Historical   Omega-3 Fatty Acids (FISH OIL) 500 mg cap Take  by mouth. Yes Provider, Historical   folic acid (FOLVITE) 1 mg tablet Take  by mouth daily. Yes Provider, Historical   hydrOXYzine pamoate (VISTARIL) 25 mg capsule Take 25 mg by mouth three (3) times daily as needed for Itching. Yes Provider, Historical   albuterol 2.5 mg /3 mL (0.083 %) nebu 3 mL, albuterol-ipratropium 2.5 mg-0.5 mg/3 ml nebu 3 mL 1 Dose by Nebulization route. Yes Provider, Historical   lactulose (KRISTALOSE) 10 gram packet Take 10 g by mouth three (3) times daily. Yes Provider, Historical   montelukast (SINGULAIR) 10 mg tablet Take 10 mg by mouth daily. Yes Provider, Historical   omeprazole (PRILOSEC) 40 mg capsule Take 40 mg by mouth daily. Yes Provider, Historical   spironolactone (ALDACTONE) 25 mg tablet Take  by mouth daily. Yes Provider, Historical   thiamine HCL (VITAMIN B-1) 100 mg tablet Take  by mouth daily. Yes Provider, Historical   tiotropium (SPIRIVA WITH HANDIHALER) 18 mcg inhalation capsule Take 1 Cap by inhalation daily.    Yes Provider, Historical albuterol (PROVENTIL HFA, VENTOLIN HFA, PROAIR HFA) 90 mcg/actuation inhaler Take 2 Puffs by inhalation. Provider, Historical   ergocalciferol (ERGOCALCIFEROL) 50,000 unit capsule TAKE 1 CAP BY MOUTH EVERY FRIDAY. 8/24/19   Provider, Historical   naproxen (NAPROSYN) 500 mg tablet Take 500 mg by mouth two (2) times daily (with meals). Provider, Historical   nicotine (NICODERM CQ) 21 mg/24 hr 1 Patch by TransDERmal route every twenty-four (24) hours. Provider, Historical       Allergies: Allergies   Allergen Reactions    Aspirin Nausea and Vomiting       Comorbid Conditions:    Past Medical History:   Diagnosis Date    Acid reflux     Anxiety     Bipolar 1 disorder (HCC)     Bronchitis     Cirrhosis (HCC)     Coughing     Depression     DVT (deep vein thrombosis) in pregnancy     ETOH abuse     Gastritis     Hepatic encephalopathy (HCC)     Hepatitis     Joint pain     Leukocytosis     Lumbar disc disease     Shortness of breath     Stress     Teeth decayed     Vitamin D deficiency     Weakness of left leg           Past Surgical History:   Procedure Laterality Date    EGD      HAND/FINGER SURGERY UNLISTED      HX BACK SURGERY      HX OVARIAN CYST REMOVAL      HX SPLENECTOMY       Data:    Visit Vitals  /76   Pulse 88   Temp 97.8 °F (36.6 °C)   Ht 5' 5\" (1.651 m)   Wt 74 kg (163 lb 3.2 oz)   SpO2 95%   BMI 27.16 kg/m²   :  Recent Labs     11/20/19  1044   *     Recent Labs     11/20/19  1044   INR 1.0   APTT 26.8       The H & P and/or progress notes and any available imaging were reviewed. The risks, indications and possible alternatives to the procedure, including doing nothing, were discussed and informed consent was obtained. Physical Exam:      Mental status:   Alert and oriented. Examination specific to the procedure proposed to be performed and any co morbid conditions:   Mallampati classification 2 ,  ASA 3   Heart:   Regular rate.    Lungs:   Normal respiratory effort. Symmetrical rise and fall of chest    The patient is an appropriate candidate to undergo the planned procedure and sedation.     [place], [place]

## 2019-11-20 NOTE — PROCEDURES
RADIOLOGY POST PROCEDURE NOTE     November 20, 2019       3:13 PM     Preoperative Diagnosis:   Left gluteal mass. Postoperative Diagnosis:  Same. :  Dr. Zachary Lim    Assistant:  None. Type of Anesthesia: 1% plain lidocaine and IV moderate sedation with Versed and Fentanyl. Procedure/Description: Image guided left gluteal mass core needle Bx. Findings:   No bleeding. Estimated blood Loss:  Minimal    Specimen Removed:   yes    Blood transfusions:  None. Implants:  None.     Complications: None    Condition: Stable    Discharge Plan:  discharge home     Anaid Mayes MD

## 2019-11-20 NOTE — PERIOP NOTES
Discharge instructions reviewed with patient and friend. Opportunity for questions provided. Voices understanding.

## 2019-12-23 ENCOUNTER — OFFICE VISIT (OUTPATIENT)
Dept: ONCOLOGY | Age: 64
End: 2019-12-23

## 2019-12-23 VITALS
DIASTOLIC BLOOD PRESSURE: 80 MMHG | HEART RATE: 99 BPM | RESPIRATION RATE: 18 BRPM | HEIGHT: 65 IN | WEIGHT: 163 LBS | TEMPERATURE: 97.8 F | SYSTOLIC BLOOD PRESSURE: 121 MMHG | OXYGEN SATURATION: 92 % | BODY MASS INDEX: 27.16 KG/M2

## 2019-12-23 DIAGNOSIS — R22.42 LEG MASS, LEFT: Primary | ICD-10-CM

## 2020-01-01 ENCOUNTER — TELEPHONE (OUTPATIENT)
Dept: ONCOLOGY | Age: 65
End: 2020-01-01

## 2020-01-01 ENCOUNTER — DOCUMENTATION ONLY (OUTPATIENT)
Dept: ONCOLOGY | Age: 65
End: 2020-01-01

## 2020-01-01 ENCOUNTER — OFFICE VISIT (OUTPATIENT)
Dept: ONCOLOGY | Age: 65
End: 2020-01-01
Payer: MEDICAID

## 2020-01-01 VITALS
BODY MASS INDEX: 31.72 KG/M2 | WEIGHT: 190.4 LBS | SYSTOLIC BLOOD PRESSURE: 127 MMHG | RESPIRATION RATE: 16 BRPM | OXYGEN SATURATION: 96 % | DIASTOLIC BLOOD PRESSURE: 74 MMHG | HEIGHT: 65 IN | HEART RATE: 88 BPM

## 2020-01-01 VITALS
DIASTOLIC BLOOD PRESSURE: 79 MMHG | OXYGEN SATURATION: 97 % | HEIGHT: 65 IN | BODY MASS INDEX: 31.68 KG/M2 | HEART RATE: 99 BPM | RESPIRATION RATE: 16 BRPM | SYSTOLIC BLOOD PRESSURE: 112 MMHG

## 2020-01-01 DIAGNOSIS — C49.9 PRIMARY UNDIFFERENTIATED SARCOMA OF SOFT TISSUE (HCC): ICD-10-CM

## 2020-01-01 DIAGNOSIS — C49.9 UNDIFFERENTIATED PLEOMORPHIC SARCOMA (HCC): Primary | ICD-10-CM

## 2020-01-01 DIAGNOSIS — C79.9 METASTATIC MALIGNANT NEOPLASM, UNSPECIFIED SITE (HCC): Primary | ICD-10-CM

## 2020-01-01 DIAGNOSIS — D64.9 NORMOCYTIC ANEMIA: ICD-10-CM

## 2020-01-01 DIAGNOSIS — C49.9 UNDIFFERENTIATED PLEOMORPHIC SARCOMA (HCC): ICD-10-CM

## 2020-01-01 DIAGNOSIS — C78.00 MALIGNANT NEOPLASM METASTATIC TO LUNG, UNSPECIFIED LATERALITY (HCC): ICD-10-CM

## 2020-01-01 LAB
ALBUMIN SERPL-MCNC: 3.6 G/DL (ref 3.8–4.8)
ALBUMIN/GLOB SERPL: 0.8 {RATIO} (ref 1.2–2.2)
ALP SERPL-CCNC: 146 IU/L (ref 39–117)
ALT SERPL-CCNC: 16 IU/L (ref 0–32)
AST SERPL-CCNC: 27 IU/L (ref 0–40)
BASOPHILS # BLD AUTO: 0.1 X10E3/UL (ref 0–0.2)
BASOPHILS NFR BLD AUTO: 1 %
BILIRUB SERPL-MCNC: 0.6 MG/DL (ref 0–1.2)
BUN SERPL-MCNC: 10 MG/DL (ref 8–27)
BUN/CREAT SERPL: 9 (ref 12–28)
CALCIUM SERPL-MCNC: 9.4 MG/DL (ref 8.7–10.3)
CHLORIDE SERPL-SCNC: 99 MMOL/L (ref 96–106)
CO2 SERPL-SCNC: 26 MMOL/L (ref 20–29)
CREAT SERPL-MCNC: 1.1 MG/DL (ref 0.57–1)
EOSINOPHIL # BLD AUTO: 0.7 X10E3/UL (ref 0–0.4)
EOSINOPHIL NFR BLD AUTO: 5 %
ERYTHROCYTE [DISTWIDTH] IN BLOOD BY AUTOMATED COUNT: 13.9 % (ref 11.7–15.4)
GLOBULIN SER CALC-MCNC: 4.4 G/DL (ref 1.5–4.5)
GLUCOSE SERPL-MCNC: 116 MG/DL (ref 65–99)
HCT VFR BLD AUTO: 39.8 % (ref 34–46.6)
HGB BLD-MCNC: 14 G/DL (ref 11.1–15.9)
IMM GRANULOCYTES # BLD AUTO: 0 X10E3/UL (ref 0–0.1)
IMM GRANULOCYTES NFR BLD AUTO: 0 %
LYMPHOCYTES # BLD AUTO: 2.3 X10E3/UL (ref 0.7–3.1)
LYMPHOCYTES NFR BLD AUTO: 17 %
MCH RBC QN AUTO: 30.7 PG (ref 26.6–33)
MCHC RBC AUTO-ENTMCNC: 35.2 G/DL (ref 31.5–35.7)
MCV RBC AUTO: 87 FL (ref 79–97)
MONOCYTES # BLD AUTO: 1.1 X10E3/UL (ref 0.1–0.9)
MONOCYTES NFR BLD AUTO: 8 %
NEUTROPHILS # BLD AUTO: 9.1 X10E3/UL (ref 1.4–7)
NEUTROPHILS NFR BLD AUTO: 69 %
PLATELET # BLD AUTO: 615 X10E3/UL (ref 150–450)
POTASSIUM SERPL-SCNC: 4.6 MMOL/L (ref 3.5–5.2)
PROT SERPL-MCNC: 8 G/DL (ref 6–8.5)
RBC # BLD AUTO: 4.56 X10E6/UL (ref 3.77–5.28)
SODIUM SERPL-SCNC: 140 MMOL/L (ref 134–144)
WBC # BLD AUTO: 13.4 X10E3/UL (ref 3.4–10.8)

## 2020-01-01 PROCEDURE — 99215 OFFICE O/P EST HI 40 MIN: CPT | Performed by: INTERNAL MEDICINE

## 2020-01-01 RX ORDER — CLINDAMYCIN HYDROCHLORIDE 300 MG/1
CAPSULE ORAL
COMMUNITY
Start: 2020-01-01 | End: 2021-01-01

## 2020-01-01 RX ORDER — ONDANSETRON 2 MG/ML
8 INJECTION INTRAMUSCULAR; INTRAVENOUS AS NEEDED
Status: CANCELLED | OUTPATIENT
Start: 2021-01-01

## 2020-01-01 RX ORDER — SODIUM CHLORIDE 0.9 % (FLUSH) 0.9 %
10-40 SYRINGE (ML) INJECTION AS NEEDED
Status: CANCELLED | OUTPATIENT
Start: 2021-01-01

## 2020-01-01 RX ORDER — SODIUM CHLORIDE 9 MG/ML
25 INJECTION, SOLUTION INTRAVENOUS CONTINUOUS
Status: CANCELLED | OUTPATIENT
Start: 2021-01-01

## 2020-01-01 RX ORDER — EPINEPHRINE 1 MG/ML
0.3 INJECTION, SOLUTION, CONCENTRATE INTRAVENOUS AS NEEDED
Status: CANCELLED | OUTPATIENT
Start: 2021-01-01

## 2020-01-01 RX ORDER — ONDANSETRON 2 MG/ML
8 INJECTION INTRAMUSCULAR; INTRAVENOUS ONCE
Status: CANCELLED | OUTPATIENT
Start: 2021-01-01 | End: 2021-01-01

## 2020-01-01 RX ORDER — HEPARIN 100 UNIT/ML
300-500 SYRINGE INTRAVENOUS AS NEEDED
Status: CANCELLED
Start: 2021-01-01

## 2020-01-01 RX ORDER — HYDROCORTISONE SODIUM SUCCINATE 100 MG/2ML
100 INJECTION, POWDER, FOR SOLUTION INTRAMUSCULAR; INTRAVENOUS AS NEEDED
Status: CANCELLED | OUTPATIENT
Start: 2021-01-01

## 2020-01-01 RX ORDER — DIPHENHYDRAMINE HYDROCHLORIDE 50 MG/ML
25 INJECTION, SOLUTION INTRAMUSCULAR; INTRAVENOUS AS NEEDED
Status: CANCELLED
Start: 2021-01-01

## 2020-01-01 RX ORDER — ALBUTEROL SULFATE 0.83 MG/ML
2.5 SOLUTION RESPIRATORY (INHALATION) AS NEEDED
Status: CANCELLED
Start: 2021-01-01

## 2020-01-01 RX ORDER — DIPHENHYDRAMINE HYDROCHLORIDE 50 MG/ML
50 INJECTION, SOLUTION INTRAMUSCULAR; INTRAVENOUS AS NEEDED
Status: CANCELLED
Start: 2021-01-01

## 2020-01-01 RX ORDER — ACETAMINOPHEN 325 MG/1
650 TABLET ORAL AS NEEDED
Status: CANCELLED
Start: 2021-01-01

## 2020-01-01 RX ORDER — LEVOFLOXACIN 750 MG/1
TABLET ORAL
COMMUNITY
Start: 2020-01-01 | End: 2021-01-01

## 2020-01-01 RX ORDER — FUROSEMIDE 40 MG/1
TABLET ORAL
COMMUNITY
Start: 2020-01-01 | End: 2021-01-01 | Stop reason: ALTCHOICE

## 2020-01-06 ENCOUNTER — DOCUMENTATION ONLY (OUTPATIENT)
Dept: ONCOLOGY | Age: 65
End: 2020-01-06

## 2020-01-06 ENCOUNTER — OFFICE VISIT (OUTPATIENT)
Dept: ONCOLOGY | Age: 65
End: 2020-01-06

## 2020-01-06 VITALS
BODY MASS INDEX: 27.16 KG/M2 | WEIGHT: 163 LBS | RESPIRATION RATE: 18 BRPM | HEART RATE: 99 BPM | HEIGHT: 65 IN | SYSTOLIC BLOOD PRESSURE: 121 MMHG | TEMPERATURE: 97.8 F | DIASTOLIC BLOOD PRESSURE: 80 MMHG

## 2020-01-06 DIAGNOSIS — R22.42 LEG MASS, LEFT: Primary | ICD-10-CM

## 2020-01-06 RX ORDER — HYDROGEN PEROXIDE 3 %
SOLUTION, NON-ORAL MISCELLANEOUS
COMMUNITY
Start: 2019-12-17

## 2020-01-06 RX ORDER — IPRATROPIUM BROMIDE AND ALBUTEROL SULFATE 2.5; .5 MG/3ML; MG/3ML
SOLUTION RESPIRATORY (INHALATION)
Refills: 6 | COMMUNITY
Start: 2019-11-27

## 2020-01-06 RX ORDER — OLANZAPINE 10 MG/1
TABLET ORAL
COMMUNITY
Start: 2019-12-23

## 2020-01-06 RX ORDER — ARIPIPRAZOLE 5 MG/1
TABLET ORAL DAILY
COMMUNITY
Start: 2020-01-02

## 2020-01-06 RX ORDER — FLUTICASONE PROPIONATE AND SALMETEROL XINAFOATE 115; 21 UG/1; UG/1
AEROSOL, METERED RESPIRATORY (INHALATION)
COMMUNITY
Start: 2019-12-19

## 2020-01-06 RX ORDER — MULTIVITAMIN
TABLET ORAL
COMMUNITY
Start: 2019-12-23

## 2020-01-06 RX ORDER — PREDNISONE 10 MG/1
10 TABLET ORAL
COMMUNITY

## 2020-01-06 RX ORDER — HYDROXYZINE 25 MG/1
TABLET, FILM COATED ORAL
COMMUNITY
Start: 2019-12-23

## 2020-01-06 RX ORDER — AZITHROMYCIN 250 MG/1
TABLET, FILM COATED ORAL
Refills: 0 | COMMUNITY
Start: 2019-10-28 | End: 2021-01-01

## 2020-01-06 RX ORDER — TRAZODONE HYDROCHLORIDE 50 MG/1
TABLET ORAL
Refills: 2 | COMMUNITY
Start: 2019-11-02 | End: 2021-01-01

## 2020-01-06 NOTE — PROGRESS NOTES
Called Dr. Montgomery Alt office at [address] [place] [address] Oncology Surgery ([phone]) to schedule pt for consult.  Was advised to fax recs to [phone] and someone will reach out to schedule after Dr. Stefanie Lees reviews records

## 2020-01-06 NOTE — PROGRESS NOTES
Hematology/medical oncology progress note    1/6/2020  Ryan Bianchi  YOB: 1955    PCP: Dr. Lolis Atkinson    Diagnosis: Large heterogeneous enhancing mass in the left lateral gluteus esther muscle:    I have explained to Ms. Bonifacio Bianchi that the MRI of her left lower extremity shows that the large left mass in the gluteal area measures 9.5 x 8.9 x 6.1 cm. The mass has complex internal features. There was no evidence of bony involvement in the sciatic nerve revealed no evidence of impingement. The femur remains intact. 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 On 11/20/2019 the patient had a biopsy from the soft tissue lumbar mass and the specimen exhibited features consistent with sarcoma. 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 Necrosis was present and the histologic grade was 3. I have explained to the patient that at this point I would like to refer her to [facility] orthopedic oncologist for an assessment. We may need to do neoadjuvant chemotherapy and radiation followed by surgery versus initial surgery alone followed by adjuvant chemotherapy and radiation. We will rely on the orthopedic oncologist for input regarding the sequencing of the therapeutic modalities. We will attempt to get her a consultative evaluation at [facility] as soon as possible. I will see her back in 3 weeks to discuss options of management in more detail, pending the results of an evaluation by the orthopedic oncologist.  Total time 25 minutes, greater than 50% of the time was in counseling and coordination of care. Lloyd A. Valentino Maxcy, MD, [facility]

## 2020-01-27 ENCOUNTER — OFFICE VISIT (OUTPATIENT)
Dept: ONCOLOGY | Age: 65
End: 2020-01-27

## 2020-01-27 ENCOUNTER — HOSPITAL ENCOUNTER (OUTPATIENT)
Dept: ONCOLOGY | Age: 65
Discharge: HOME | End: 2020-01-27

## 2020-01-27 VITALS
DIASTOLIC BLOOD PRESSURE: 80 MMHG | OXYGEN SATURATION: 98 % | BODY MASS INDEX: 27.82 KG/M2 | HEIGHT: 65 IN | HEART RATE: 103 BPM | RESPIRATION RATE: 18 BRPM | WEIGHT: 167 LBS | SYSTOLIC BLOOD PRESSURE: 134 MMHG

## 2020-01-27 DIAGNOSIS — R22.42 LEG MASS, LEFT: ICD-10-CM

## 2020-01-27 DIAGNOSIS — C49.9 PRIMARY UNDIFFERENTIATED SARCOMA OF SOFT TISSUE (HCC): Primary | ICD-10-CM

## 2020-01-27 DIAGNOSIS — G89.3 CANCER ASSOCIATED PAIN: ICD-10-CM

## 2020-01-27 RX ORDER — TRAMADOL HYDROCHLORIDE 50 MG/1
50 TABLET ORAL
Qty: 90 TAB | Refills: 0 | Status: SHIPPED | OUTPATIENT
Start: 2020-01-27 | End: 2020-02-26

## 2020-01-27 NOTE — PROGRESS NOTES
Hematology/medical oncology progress note    1/27/2020  Ryan Mcbride  YOB: 1955    PCP: Dr. 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 She was referred to Dr. Chandan Mina, orthopedic oncologist at [facility]. He recommended that we refer her to the local radiation oncologist and that we schedule her for CT scans of the chest, abdomen, and pelvis to rule out metastatic disease. She has an appointment with Dr. Yadira Hunter, the radiation oncologist on 2/4/2020 and she is scheduled to have the CT scans through the chest, abdomen and pelvis, done on 1/30/2020. She is currently complaining of progressive pain which is no longer responding to the aspirin compound that she is taking. I recommended tramadol 50 mg every 8 hours and a prescription was sent to her pharmacy, [facility] on [address] in [place]. Total time 30 minutes, greater than 50% of the time was in counseling and coordination of care. I will see her back in 3 weeks to review the CT results. Bony Guzmán MD, [place]

## 2020-02-17 ENCOUNTER — OFFICE VISIT (OUTPATIENT)
Dept: ONCOLOGY | Age: 65
End: 2020-02-17

## 2020-02-17 VITALS
SYSTOLIC BLOOD PRESSURE: 100 MMHG | HEIGHT: 65 IN | BODY MASS INDEX: 27.63 KG/M2 | HEART RATE: 100 BPM | TEMPERATURE: 98 F | DIASTOLIC BLOOD PRESSURE: 73 MMHG | WEIGHT: 165.8 LBS

## 2020-02-17 DIAGNOSIS — G89.3 CANCER ASSOCIATED PAIN: ICD-10-CM

## 2020-02-17 DIAGNOSIS — C49.9 PRIMARY UNDIFFERENTIATED SARCOMA OF SOFT TISSUE (HCC): Primary | ICD-10-CM

## 2020-02-17 NOTE — PATIENT INSTRUCTIONS
Learning About Soft Tissue Sarcomas What is a soft tissue sarcoma? A soft tissue sarcoma is a growth of abnormal cells in the body's soft tissues. These tissues include the muscles, lymph and blood vessels, nerves, and fat. It can also include cartilage and other connective tissue. When cancer cells are found in the tissue, the tumor is called malignant. Sarcoma is another name for a malignant soft tissue tumor. Sarcomas can spread to other parts of the body, like the lungs. What are some common types? Some common types of soft tissue sarcomas include: 
Undifferentiated and unclassified sarcomas. These tumors are more common in older adults. They often appear in the arms, legs, or pelvis. Gastrointestinal stromal tumors (GIST). This cancer forms in the gastrointestinal (GI) tract. It's usually in the stomach or small intestine. Liposarcomas. This type of soft tissue tumor is made up of fat cells. It can be found anywhere on the body. It's sometimes found in more than one spot. Leiomyosarcoma. These tumors are often found in the muscles in the belly and in the uterus, the stomach, and the small intestine. Other types of soft tissue tumors may appear on the skin, nerves, belly, and limbs. Examples include [place], and epithelioid sarcomas. What are the symptoms? You may feel a swelling or lump near the tumor. Or you may feel pain or have trouble breathing if the tumor grows large enough to press against nerves or organs inside your body. How are they diagnosed? Your doctor will ask you about your symptoms and past health and will examine you. If your doctor can feel a lump or mass in the soft tissue, or if you have other symptoms, you will get some tests. The tests can find out if it's cancer. They can also help your doctor figure out the best treatment for the tumor. Your doctor may also find a tumor when taking X-rays or other images for another problem. · You may have one or more imaging tests to get a better look at the tumor. These may include: ? X-rays. ? Ultrasound. ? CT scan. 
? MRI scan. 
? PET or other nuclear scan. · You may need blood tests and other lab work. · You may need a biopsy so that a sample of the tumor can be looked at under a microscope. This sample may also be used to test for biomarkers, which will help with planning treatment. · Doctors may also examine other parts of your body to make sure that the tumor hasn't spread. The doctor may talk to you about what \"stage\" your cancer is. The stage refers to how large the tumor is and how far it has spread. It also includes the tumor grade, which describes what the cancer cells look like and how likely they are to grow and spread. These can help the doctor find out what type of treatment you may need. And they may help to find a clinical trial that has treatments for your type of cancer. How are they treated? Your doctor will give you a detailed treatment plan. Your plan will depend on the type of cancer you have, how far it has spread, and how quickly it is growing. You may need surgery to remove cancer from the tissue. Radiation therapy is often used for soft tissue cancers. It uses high-energy rays, such as X-rays, to destroy cancer cells and shrink tumors in the body. It may be used before, during, or after surgery. You may have chemotherapy (chemo). Chemo is medicine that destroys cancer cells. For advanced sarcomas, you may also have targeted therapy. It uses medicines that target the cancer cells. And immunotherapy can help your immune system fight the cancer. What are some other things to think about? · Some tumors are aggressive and need treatment right away. But most cancer grows slowly enough that you can take a little time to find out more about your cancer as you decide about treatment. · Think about getting a second opinion from another doctor.  Before you start major treatment, it's a good idea to check with another doctor about the type of cancer you have and the stage of your cancer. Your doctor or insurance company can recommend someone for a second opinion. · Ask any questions you might have. You can talk to your doctor, nurses, counselors, and other advisors. · Talk to family, friends, and supporters. Get the kinds of help you need. Follow-up care is a key part of your treatment and safety. Be sure to make and go to all appointments, and call your doctor if you are having problems. It's also a good idea to know your test results and keep a list of the medicines you take. Where can you learn more? Go to http://laura-lucia.info/. Enter I303 in the search box to learn more about \"Learning About Soft Tissue Sarcomas. \" Current as of: December 19, 2018 Content Version: 12.2 © 1564-5513 [employer]. Care instructions adapted under license by [employer] (which disclaims liability or warranty for this information). If you have questions about a medical condition or this instruction, always ask your healthcare professional. [place] [address] any warranty or liability for your use of this information.

## 2020-02-17 NOTE — PROGRESS NOTES
Hematology/medical oncology progress note 2/17/2020 Ryan Bermudez YOB: 1955 PCP: Dr. 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 She was referred to Dr. Paul Way, orthopedic oncologist at [facility]. He recommended that we refer her to the local radiation oncologist and that we schedule her for CT scans of the chest, abdomen, and pelvis to rule out metastatic disease. I informed the patient that the CT scans of the chest, abdomen, and pelvis revealed that the mass in the posterior left buttocks has increased to 14.1 cm and previously measured 5.1 cm. The imaging study did not reveal any evidence of metastatic disease. The sarcoma appears to be localized to the left gluteal area. She is now receiving external beam radiation therapy which is provided by Dr. Pavithra Styles in [place]. I will see her back in clinic in about 5 weeks after she completes her treatment. If she gets a good response there is a probability that she may have a residual surgically removed. Total time 25 minutes, greater than 50% of the time was in counseling and coordination of care. Bony Alva MD, [facility]

## 2020-03-18 ENCOUNTER — OFFICE VISIT (OUTPATIENT)
Dept: ONCOLOGY | Age: 65
End: 2020-03-18

## 2020-03-18 VITALS
SYSTOLIC BLOOD PRESSURE: 125 MMHG | HEIGHT: 65 IN | RESPIRATION RATE: 16 BRPM | DIASTOLIC BLOOD PRESSURE: 73 MMHG | WEIGHT: 168 LBS | BODY MASS INDEX: 27.99 KG/M2 | TEMPERATURE: 99 F | OXYGEN SATURATION: 98 % | HEART RATE: 109 BPM

## 2020-03-18 DIAGNOSIS — C49.9 PRIMARY UNDIFFERENTIATED SARCOMA OF SOFT TISSUE (HCC): Primary | ICD-10-CM

## 2020-03-18 DIAGNOSIS — R22.42 LEG MASS, LEFT: ICD-10-CM

## 2020-03-18 DIAGNOSIS — G89.3 CANCER ASSOCIATED PAIN: ICD-10-CM

## 2020-03-18 PROBLEM — F19.11 HISTORY OF INTRAVENOUS DRUG ABUSE (HCC): Status: ACTIVE | Noted: 2019-11-01

## 2020-03-18 PROBLEM — Z12.2 ENCOUNTER FOR SCREENING FOR LUNG CANCER: Status: ACTIVE | Noted: 2020-03-18

## 2020-03-18 PROBLEM — J41.0 SMOKERS' COUGH (HCC): Status: ACTIVE | Noted: 2020-03-18

## 2020-03-18 PROBLEM — F17.219 CIGARETTE NICOTINE DEPENDENCE WITH NICOTINE-INDUCED DISORDER: Status: ACTIVE | Noted: 2020-03-18

## 2020-03-18 PROBLEM — J44.9 CHRONIC OBSTRUCTIVE PULMONARY DISEASE (HCC): Status: ACTIVE | Noted: 2019-09-18

## 2020-03-18 PROBLEM — J96.11 CHRONIC RESPIRATORY FAILURE WITH HYPOXIA (HCC): Status: ACTIVE | Noted: 2020-03-18

## 2020-03-18 RX ORDER — DICLOFENAC SODIUM 10 MG/G
GEL TOPICAL
COMMUNITY
Start: 2020-02-27

## 2020-03-18 RX ORDER — ONDANSETRON 4 MG/1
TABLET, FILM COATED ORAL
COMMUNITY
Start: 2020-01-28

## 2020-03-18 RX ORDER — VARENICLINE TARTRATE 1 MG/1
TABLET, FILM COATED ORAL
COMMUNITY
Start: 2019-11-25 | End: 2021-01-01

## 2020-03-18 RX ORDER — QUETIAPINE FUMARATE 50 MG/1
TABLET, FILM COATED ORAL
COMMUNITY
Start: 2020-02-27

## 2020-03-18 NOTE — PROGRESS NOTES
Hematology/medical oncology progress note 3/18/2020 Ryan Garcia YOB: 1955 PCP: Dr. 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 She was referred to Dr. Meli Lozada, orthopedic oncologist at [facility]. He recommended that we refer her to the local radiation oncologist and that we schedule her for CT scans of the chest, abdomen, and pelvis to rule out metastatic disease. I informed the patient that the CT scans of the chest, abdomen, and pelvis revealed that the mass in the posterior left buttocks has increased to 14.1 cm and previously measured 5.1 cm. The imaging study did not reveal any evidence of metastatic disease. The sarcoma appears to be localized to the left gluteal area. She was did receive external beam radiation therapy which is provided by Dr. Andi Granger in [place]. She completed the radiation on 3/18/2020 and she will be schedule to go back to see Dr Meli Lozada at [facility]. Pt made are of plan. I will see her back in clinic in about 8 weeks after she has seen the surgeon Dr Meli Lozada to see to see if she did get a good response and whether there is a probability that she may have a residual surgically removed. Total time 25 minutes, greater than 50% of the time was in counseling and coordination of care. ELENA Juarez I have assessed the patient independently and  agree with the full assessment as outlined.  
Naty Inman MD, [place]

## 2020-03-18 NOTE — PATIENT INSTRUCTIONS
Complete Blood Count (CBC): About This Test 
What is it? A complete blood count (CBC) is a blood test that gives important information about your blood cells, especially red blood cells, white blood cells, and platelets. Why is this test done? A CBC may be done as part of a regular physical exam. There are many other reasons that a doctor may want this blood test, including to: · Find the cause of symptoms such as fatigue, weakness, fever, bruising, or weight loss. · Find anemia or an infection. · See how much blood has been lost if there is bleeding. · Diagnose diseases of the blood, such as leukemia or polycythemia. How can you prepare for the test? 
You do not need to do anything before having this test. 
What happens during the test? 
The health professional taking a sample of your blood will: · Wrap an elastic band around your upper arm. This makes the veins below the band larger so it is easier to put a needle into the vein. · Clean the needle site with alcohol. · Put the needle into the vein. · Attach a tube to the needle to fill it with blood. · Remove the band from your arm when enough blood is collected. · Put a gauze pad or cotton ball over the needle site as the needle is removed. · Put pressure on the site and then put on a bandage. If this blood test is done on a baby, a heel stick may be done instead of a blood draw from a vein. What happens after the test? 
· You will probably be able to go home right away. · You can go back to your usual activities right away. Follow-up care is a key part of your treatment and safety. Be sure to make and go to all appointments, and call your doctor if you are having problems. It's also a good idea to keep a list of the medicines you take. Ask your doctor when you can expect to have your test results. Where can you learn more? Go to http://laura-lucia.info/ Enter N826 in the search box to learn more about \"Complete Blood Count (CBC): About This Test.\" Current as of: December 8, 2019Content Version: 12.4 © 7292-8116 Healthwise, Incorporated. Care instructions adapted under license by [employer] (which disclaims liability or warranty for this information). If you have questions about a medical condition or this instruction, always ask your healthcare professional. [address] any warranty or liability for your use of this information. Sarcoma: Care Instructions Your Care Instructions Sarcoma is cancer of certain tissues of the body, such as the muscles, connective tissues (like tendons), blood vessels, bones, and fat. Cancer occurs when abnormal cells grow out of control. The cells can spread to other areas of the body. Sarcoma is a general name for several rare cancers that occur in these tissues. Doctors treat this type of cancer with surgery, radiation, or medicines (chemotherapy). Your doctor will treat you based on how quickly or slowly the type of cancer you have may spread, how far the cancer has spread, and your overall health. One or more treatments may be used. When you find out that you have cancer, you may feel many emotions and may need some help coping. Seek out family, friends, and counselors for support. You also can do things at home to make yourself feel better while you go through treatment. Call the [employer] (3-107.697.1430) or visit its website at [address] [employer] for more information. Follow-up care is a key part of your treatment and safety. Be sure to make and go to all appointments, and call your doctor if you are having problems. It's also a good idea to know your test results and keep a list of the medicines you take. How can you care for yourself at home? · Take your medicines exactly as prescribed.  Call your doctor if you think you are having a problem with your medicine. You may get medicine for nausea and vomiting if you have these side effects. · Eat healthy food. If you do not feel like eating, try to eat food that has protein and extra calories to keep up your strength and prevent weight loss. Drink liquid meal replacements for extra calories and protein. Try to eat your main meal early. · Get some physical activity every day, but do not get too tired. Keep doing the hobbies you enjoy as your energy allows. · Take steps to control your stress and workload. Learn relaxation techniques. ? Share your feelings. Stress and tension affect our emotions. By expressing your feelings to others, you may be able to understand and cope with them. ? Consider joining a support group. Talking about a problem with your spouse, a good friend, or other people with similar problems is a good way to reduce tension and stress. ? Express yourself through art. Try writing, crafts, dance, or art to relieve stress. Some dance, writing, or art groups may be available just for people who have cancer. ? Be kind to your body and mind. Getting enough sleep, eating a healthy diet, and taking time to do things you enjoy can contribute to an overall feeling of balance in your life and help reduce stress. ? Get help if you need it. Discuss your concerns with your doctor or counselor. · If you are vomiting or have diarrhea: ? Drink plenty of fluids (enough so that your urine is light yellow or clear like water) to prevent dehydration. Choose water and other caffeine-free clear liquids. If you have kidney, heart, or liver disease and have to limit fluids, talk with your doctor before you increase the amount of fluids you drink. ? When you are able to eat, try clear soups, mild foods, and liquids until all symptoms are gone for 12 to 48 hours. Other good choices include dry toast, crackers, cooked cereal, and gelatin dessert, such as Jell-O. · If you have not already done so, prepare a list of advance directives. Advance directives are instructions to your doctor and family members about what kind of care you want if you become unable to speak or express yourself. When should you call for help? Call 911 anytime you think you may need emergency care. For example, call if: 
  · You passed out (lost consciousness).  
 Call your doctor now or seek immediate medical care if: 
  · You have a fever.  
  · You have abnormal bleeding.  
  · You have new or worse pain.  
  · You think you have an infection.  
  · You have new symptoms, such as a cough, belly pain, vomiting, diarrhea, or a rash.  
 Watch closely for changes in your health, and be sure to contact your doctor if: 
  · You are much more tired than usual.  
  · You have swollen glands in your armpits, groin, or neck.  
  · You do not get better as expected. Current as of: August 21, 2019Content Version: 12.4 © 3292-9964 [employer]. Care instructions adapted under license by [employer] (which disclaims liability or warranty for this information). If you have questions about a medical condition or this instruction, always ask your healthcare professional. [address] any warranty or liability for your use of this information. Learning About Soft Tissue Sarcomas What is a soft tissue sarcoma? A soft tissue sarcoma is a growth of abnormal cells in the body's soft tissues. These tissues include the muscles, lymph and blood vessels, nerves, and fat. It can also include cartilage and other connective tissue. When cancer cells are found in the tissue, the tumor is called malignant. Sarcoma is another name for a malignant soft tissue tumor. Sarcomas can spread to other parts of the body, like the lungs. What are some common types? Some common types of soft tissue sarcomas include: 
Undifferentiated and unclassified sarcomas. These tumors are more common in older adults. They often appear in the arms, legs, or pelvis. Gastrointestinal stromal tumors (GIST). This cancer forms in the gastrointestinal (GI) tract. It's usually in the stomach or small intestine. Liposarcomas. This type of soft tissue tumor is made up of fat cells. It can be found anywhere on the body. It's sometimes found in more than one spot. Leiomyosarcoma. These tumors are often found in the muscles in the belly and in the uterus, the stomach, and the small intestine. Other types of soft tissue tumors may appear on the skin, nerves, belly, and limbs. Examples include Olney Mckeon, and epithelioid sarcomas. What are the symptoms? You may feel a swelling or lump near the tumor. Or you may feel pain or have trouble breathing if the tumor grows large enough to press against nerves or organs inside your body. How are they diagnosed? Your doctor will ask you about your symptoms and past health and will examine you. If your doctor can feel a lump or mass in the soft tissue, or if you have other symptoms, you will get some tests. The tests can find out if it's cancer. They can also help your doctor figure out the best treatment for the tumor. Your doctor may also find a tumor when taking X-rays or other images for another problem. · You may have one or more imaging tests to get a better look at the tumor. These may include: ? X-rays. ? Ultrasound. ? CT scan. 
? MRI scan. 
? PET or other nuclear scan. · You may need blood tests and other lab work. · You may need a biopsy so that a sample of the tumor can be looked at under a microscope. This sample may also be used to test for biomarkers, which will help with planning treatment. · Doctors may also examine other parts of your body to make sure that the tumor hasn't spread. The doctor may talk to you about what \"stage\" your cancer is.  The stage refers to how large the tumor is and how far it has spread. It also includes the tumor grade, which describes what the cancer cells look like and how likely they are to grow and spread. These can help the doctor find out what type of treatment you may need. And they may help to find a clinical trial that has treatments for your type of cancer. How are they treated? Treatment for a soft tissue sarcoma is based on the stage, type, and location of the cancer and other things, such as your overall health. The main treatments include: 
Surgery. Mari Corea probably have surgery to remove the cancer. Radiation therapy. This uses high-dose X-rays to destroy cancer cells and shrink tumors. It may be used before, during, or after surgery. Chemotherapy. These medicines kill fast-growing cells, including cancer cells and some normal cells. In some cases, other treatments may be used, such as: Targeted therapy. These medicines attack only cancer cells, not normal cells. They help keep cancer from growing or spreading. Immunotherapy. This treatment helps your immune system fight cancer. It may be given in several ways. Sometimes a clinical trial may be a good choice. Your doctor will talk with you about your options and then make a treatment plan. What are some other things to think about? · Some tumors are aggressive and need treatment right away. But most cancer grows slowly enough that you can take a little time to find out more about your cancer as you decide about treatment. · Think about getting a second opinion from another doctor. Before you start major treatment, it's a good idea to check with another doctor about the type of cancer you have and the stage of your cancer. Your doctor or insurance company can recommend someone for a second opinion. · Ask any questions you might have. You can talk to your doctor, nurses, counselors, and other advisors. · Talk to family, friends, and supporters. Get the kinds of help you need. Follow-up care is a key part of your treatment and safety. Be sure to make and go to all appointments, and call your doctor if you are having problems. It's also a good idea to know your test results and keep a list of the medicines you take. Where can you learn more? Go to http://laura-lucia.info/ Enter D346 in the search box to learn more about \"Learning About Soft Tissue Sarcomas. \" Current as of: August 21, 2019Content Version: 12.4 © 4369-5168 Healthwise, Incorporated. Care instructions adapted under license by [employer] (which disclaims liability or warranty for this information). If you have questions about a medical condition or this instruction, always ask your healthcare professional. [address] any warranty or liability for your use of this information.

## 2020-03-20 LAB
ALBUMIN SERPL-MCNC: 2.3 G/DL (ref 3.8–4.8)
ALBUMIN/GLOB SERPL: 0.5 {RATIO} (ref 1.2–2.2)
ALP SERPL-CCNC: 194 IU/L (ref 39–117)
ALT SERPL-CCNC: 14 IU/L (ref 0–32)
AST SERPL-CCNC: 21 IU/L (ref 0–40)
BASOPHILS # BLD AUTO: 0.1 X10E3/UL (ref 0–0.2)
BASOPHILS NFR BLD AUTO: 1 %
BILIRUB SERPL-MCNC: 0.4 MG/DL (ref 0–1.2)
BUN SERPL-MCNC: 8 MG/DL (ref 8–27)
BUN/CREAT SERPL: 11 (ref 12–28)
CALCIUM SERPL-MCNC: 8.3 MG/DL (ref 8.7–10.3)
CHLORIDE SERPL-SCNC: 88 MMOL/L (ref 96–106)
CO2 SERPL-SCNC: 28 MMOL/L (ref 20–29)
CREAT SERPL-MCNC: 0.75 MG/DL (ref 0.57–1)
EOSINOPHIL # BLD AUTO: 0.2 X10E3/UL (ref 0–0.4)
EOSINOPHIL NFR BLD AUTO: 1 %
ERYTHROCYTE [DISTWIDTH] IN BLOOD BY AUTOMATED COUNT: 16 % (ref 11.7–15.4)
GLOBULIN SER CALC-MCNC: 4.8 G/DL (ref 1.5–4.5)
GLUCOSE SERPL-MCNC: 320 MG/DL (ref 65–99)
HCT VFR BLD AUTO: 28.9 % (ref 34–46.6)
HGB BLD-MCNC: 9.2 G/DL (ref 11.1–15.9)
IMM GRANULOCYTES # BLD AUTO: 0.1 X10E3/UL (ref 0–0.1)
IMM GRANULOCYTES NFR BLD AUTO: 1 %
LYMPHOCYTES # BLD AUTO: 1.4 X10E3/UL (ref 0.7–3.1)
LYMPHOCYTES NFR BLD AUTO: 10 %
MCH RBC QN AUTO: 26.3 PG (ref 26.6–33)
MCHC RBC AUTO-ENTMCNC: 31.8 G/DL (ref 31.5–35.7)
MCV RBC AUTO: 83 FL (ref 79–97)
MONOCYTES # BLD AUTO: 1.3 X10E3/UL (ref 0.1–0.9)
MONOCYTES NFR BLD AUTO: 10 %
MORPHOLOGY BLD-IMP: ABNORMAL
NEUTROPHILS # BLD AUTO: 10.2 X10E3/UL (ref 1.4–7)
NEUTROPHILS NFR BLD AUTO: 77 %
PLATELET # BLD AUTO: 1024 X10E3/UL (ref 150–450)
POTASSIUM SERPL-SCNC: 4.5 MMOL/L (ref 3.5–5.2)
PROT SERPL-MCNC: 7.1 G/DL (ref 6–8.5)
RBC # BLD AUTO: 3.5 X10E6/UL (ref 3.77–5.28)
SODIUM SERPL-SCNC: 128 MMOL/L (ref 134–144)
WBC # BLD AUTO: 13.3 X10E3/UL (ref 3.4–10.8)

## 2020-04-17 PROBLEM — Z12.2 ENCOUNTER FOR SCREENING FOR LUNG CANCER: Status: RESOLVED | Noted: 2020-03-18 | Resolved: 2020-04-17

## 2020-04-24 ENCOUNTER — TELEPHONE (OUTPATIENT)
Dept: ONCOLOGY | Age: 65
End: 2020-04-24

## 2020-04-24 NOTE — TELEPHONE ENCOUNTER
Georgina Mendez called with [facility] checking to see if aware pt is aslo getting Suboxono

## 2020-04-29 ENCOUNTER — VIRTUAL VISIT (OUTPATIENT)
Dept: ONCOLOGY | Age: 65
End: 2020-04-29

## 2020-04-29 VITALS — BODY MASS INDEX: 27.32 KG/M2 | HEIGHT: 65 IN | WEIGHT: 164 LBS

## 2020-04-29 DIAGNOSIS — G89.3 CANCER ASSOCIATED PAIN: ICD-10-CM

## 2020-04-29 DIAGNOSIS — C49.9 PRIMARY UNDIFFERENTIATED SARCOMA OF SOFT TISSUE (HCC): Primary | ICD-10-CM

## 2020-04-29 RX ORDER — MIRTAZAPINE 15 MG/1
TABLET, FILM COATED ORAL
COMMUNITY
Start: 2020-04-27

## 2020-04-29 RX ORDER — SILVER SULFADIAZINE 10 G/1000G
CREAM TOPICAL
COMMUNITY
Start: 2020-04-15

## 2020-04-29 NOTE — PROGRESS NOTES
Hematology/medical oncology progress note 4/29/2020 Jame Hodgson is a 75 y.o. female evaluated via telephone on 4/29/2020. YOB: 1955 PCP: Dr. Delfina Chisholm Diagnosis: Soft tissue sarcoma, left gluteal area Consent: 
She and/or health care decision maker is aware that she may receive a bill for this telephone service, depending on her insurance coverage, and has provided verbal consent to proceed: YES Documentation: I communicated with the patient and/or health care decision maker about ongoing management of her soft tissue sarcoma involving the left gluteal area. I discussed with Ms. Wale Vargas the importance of her keeping the appointment with Dr. Faviola Varma at [facility] orthopedic oncology department. If she has to undergo surgery then he will advise us as to whether or not he would recommend adjuvant systemic chemotherapy. She completed a full course of neoadjuvant radiation treatment. She tentatively has an appointment for 5/4/2020 with the orthopedic oncologist.  I will plan to have her return to the clinic in about 8 weeks to see Dr. Bouchra Sandhu to discuss additional adjuvant treatment recommendations. The patient had her questions answered to her satisfaction. Total time 25 minutes, greater than 50% of the time was in counseling and coordination of care. I AFFIRM this is a Patient Initiated Episode with an Established Patient who has not had a related appointment within my department in the past 7 days or scheduled within the next 24 hours. Total Time: Total time 25 minutes, greater than 50% of the time was in counseling and coordination of care. Note: not billable if this call serves to triage the patient into an appointment for the relevant concern Bony Winchester MD, Shaylee Heredia

## 2020-06-30 ENCOUNTER — OFFICE VISIT (OUTPATIENT)
Dept: ONCOLOGY | Age: 65
End: 2020-06-30

## 2020-06-30 VITALS
BODY MASS INDEX: 29.92 KG/M2 | WEIGHT: 179.8 LBS | DIASTOLIC BLOOD PRESSURE: 83 MMHG | OXYGEN SATURATION: 97 % | HEART RATE: 107 BPM | SYSTOLIC BLOOD PRESSURE: 140 MMHG | TEMPERATURE: 99.3 F | RESPIRATION RATE: 20 BRPM

## 2020-06-30 DIAGNOSIS — R22.42 LEG MASS, LEFT: ICD-10-CM

## 2020-06-30 DIAGNOSIS — C49.9 UNDIFFERENTIATED PLEOMORPHIC SARCOMA (HCC): Primary | ICD-10-CM

## 2020-06-30 NOTE — PROGRESS NOTES
Hematology/Oncology Note Name: Tam Gunter Date: 2020 : 1955 Ramon Lizarraga MD  
 
 
Oncology History: Ms. Eduardo Estrada  is a 75 y.o. -American woman who has follow-up for her left gluteal mass. -- In 2019 she had an ultrasound of the area and there was no evidence of a cystic component the area was described as heterogeneous measuring about 6.8 x 4.85 8.5 cm. An attempt to aspirate fluid from the left posterior hip/gluteal mass was unsuccessful. More recently she states that the mass has actually doubled in size and is now about the size of a softball.  
-- 2019. Liver, needle biopsy: mild to moderate chronic hepatitis (NOVA and MATI score 2-3) with bridging fibrosis (nova and mati score 3); see comment. -- 2019. Lumbar mass, core biopsies: Soft tissue tumor with features consistent with sarcoma. 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 Necrosis: present. Histologic grade (fnclcc): grade 3. 
-- 2020: CT C/A/P: Significant interval increase in size of the mass within the posterior left buttock which now measures 14.1 cm, compared to 5.1 cm on prior examination. A new indeterminant moderate compression fracture is present in the T5 vertebral body. -- 2020 Left gluteal sarcoma resection Subjective: Chief complaint: Gluteal mass, sarcoma History of present illness: Ms. Eduardo Estrada is a 66-year-old -American woman who states that about a year ago she noticed a small masslike lesion over her left gluteal area. She did not think much of it. However over the past few months it has started to enlarge in size. She had mass biopsied which pathology reported sarcoma,undifferentiated pleomorphic . She had completed neoadjuvant radiation therapy. Subsequently she underwent resection on 2020 by Dr. Lizzette Hastings.  She stated the pathology report was still pending. She will follow up her Surgeon next week for further plan. Today she reported her pain has been better after surgery. Her wound is healing well. Now she is able to walk. She has chronic SOB from long-standing COPD. She is oxygen dependent. She is an active smoker, currently 3-4 cigarettes a day. The patient otherwise has no other complaints. Denied fever, chills, night sweat, unintentional weight loss, skin lumps or bumps, acute bleeding or bruising issues. Denied headache, acute vision change, dizziness, chest pain, palpitation, productive cough, nausea, vomiting, abdominal pain, altered bowel habits, dysuria, new bone pain or back pain, focal numbness or weakness. Past Medical History:  
Diagnosis Date  Acid reflux  Anxiety  Bipolar 1 disorder ([facility] [address]  Bronchitis  Cirrhosis ([facility] [address]  Coughing  Depression  DVT (deep vein thrombosis) in pregnancy  ETOH abuse  Gastritis  Hepatic encephalopathy ([facility] [address]  Hepatitis  Joint pain  Leukocytosis  Lumbar disc disease  Shortness of breath  Stress  Teeth decayed  Vitamin D deficiency  Weakness of left leg Allergies Allergen Reactions  Aspirin Nausea and Vomiting Past Surgical History:  
Procedure Laterality Date  EGD  HAND/FINGER SURGERY UNLISTED  HX BACK SURGERY    
 HX OVARIAN CYST REMOVAL    
 HX SPLENECTOMY Social History Socioeconomic History  Marital status:  Spouse name: Not on file  Number of children: Not on file  Years of education: Not on file  Highest education level: Not on file Occupational History  Not on file Social Needs  Financial resource strain: Not on file  Food insecurity Worry: Not on file Inability: Not on file  Transportation needs Medical: Not on file Non-medical: Not on file Tobacco Use  Smoking status: Current Every Day Smoker  Smokeless tobacco: Never Used Substance and Sexual Activity  Alcohol use: Not Currently  Drug use: Yes Types: Cocaine, Heroin  Sexual activity: Yes Lifestyle  Physical activity Days per week: Not on file Minutes per session: Not on file  Stress: Not on file Relationships  Social connections Talks on phone: Not on file Gets together: Not on file Attends [employer] service: Not on file Active member of club or organization: Not on file Attends meetings of clubs or organizations: Not on file Relationship status: Not on file  Intimate partner violence Fear of current or ex partner: Not on file Emotionally abused: Not on file Physically abused: Not on file Forced sexual activity: Not on file Other Topics Concern  Not on file Social History Narrative  Not on file Family History Problem Relation Age of Onset  Heart Disease Mother  Stroke Mother  Heart Disease Father  Heart Disease Brother  Hypertension Brother Current Outpatient Medications Medication Sig Dispense Refill  mirtazapine (REMERON) 15 mg tablet TAKE 1 TABLET BY MOUTH EVERYDAY AT BEDTIME  silver sulfADIAZINE (SILVADENE) 1 % topical cream APPLY TO AFFECTED AREA 3 TIMES A DAY  diclofenac (VOLTAREN) 1 % gel APPLY 4 GM TO LEFT THIGH/BUTTOCKS 4 TIMES A DAY AS NEEDED FOR PAIN    
 ondansetron hcl (ZOFRAN) 4 mg tablet  QUEtiapine (SEROquel) 50 mg tablet TAKE 1 TABLET BY MOUTH AT BEDTIME  varenicline (Chantix Continuing Month Box) 1 mg tablet Take  by mouth.  ARIPiprazole (ABILIFY) 5 mg tablet  azithromycin (ZITHROMAX) 250 mg tablet TAKE 2 TABLETS BY MOUTH TODAY, THEN TAKE 1 TABLET DAILY FOR 4 DAYS  0  
 esomeprazole (NEXIUM) 20 mg capsule TAKE 20 MG BY MOUTH ONCE A DAY.  ADVAIR -15 mcg/actuation inhaler  hydrOXYzine HCl (ATARAX) 25 mg tablet  albuterol-ipratropium (DUO-NEB) 2.5 mg-0.5 mg/3 ml nebu PLEASE SEE ATTACHED FOR DETAILED DIRECTIONS  6  
 DAILY-GIORGIO tablet TAKE 1 TABLET BY MOUTH EVERY DAY    
 OLANZapine (ZYPREXA) 10 mg tablet  predniSONE (DELTASONE) 10 mg tablet Take 10 mg by mouth.  traZODone (DESYREL) 50 mg tablet TAKE 1 TABLET BY MOUTH EVERY NIGHT AT BEDTIME AS NEEDED FOR INSOMNIA  2  
 albuterol (PROVENTIL HFA, VENTOLIN HFA, PROAIR HFA) 90 mcg/actuation inhaler Take 2 Puffs by inhalation.  buprenorphine-naloxone (SUBOXONE) 8-2 mg film sublingaul film 1 Each by SubLINGual route.  SUBOXONE 8-2 mg film sublingaul film DISSOLVE 1 FILM UNDER THE TONGUE TWICE DAILY  0  
 buPROPion XL (WELLBUTRIN XL) 150 mg tablet TAKE 1 TABLET BY MOUTH EVERY DAY  3  
 busPIRone (BUSPAR) 7.5 mg tablet Take 7.5 mg by mouth.  ergocalciferol (ERGOCALCIFEROL) 50,000 unit capsule TAKE 1 CAP BY MOUTH EVERY FRIDAY. 1  
 Omega-3 Fatty Acids (FISH OIL) 500 mg cap Take  by mouth.  folic acid (FOLVITE) 1 mg tablet Take  by mouth daily.  hydrOXYzine pamoate (VISTARIL) 25 mg capsule Take 25 mg by mouth three (3) times daily as needed for Itching.  albuterol 2.5 mg /3 mL (0.083 %) nebu 3 mL, albuterol-ipratropium 2.5 mg-0.5 mg/3 ml nebu 3 mL 1 Dose by Nebulization route.  lactulose (KRISTALOSE) 10 gram packet Take 10 g by mouth three (3) times daily.  montelukast (SINGULAIR) 10 mg tablet Take 10 mg by mouth daily.  naproxen (NAPROSYN) 500 mg tablet Take 500 mg by mouth two (2) times daily (with meals).  nicotine (NICODERM CQ) 21 mg/24 hr 1 Patch by TransDERmal route every twenty-four (24) hours.  omeprazole (PRILOSEC) 40 mg capsule Take 40 mg by mouth daily.  spironolactone (ALDACTONE) 25 mg tablet Take  by mouth daily.  thiamine HCL (VITAMIN B-1) 100 mg tablet Take  by mouth daily.  tiotropium (SPIRIVA WITH HANDIHALER) 18 mcg inhalation capsule Take 1 Cap by inhalation daily. Review of Systems Constitutional: Negative for chills, diaphoresis, fever, malaise/fatigue and weight loss. Respiratory: Negative for cough, hemoptysis, shortness of breath and wheezing. Cardiovascular: Negative for chest pain, palpitations and leg swelling. Gastrointestinal: Negative for abdominal pain, diarrhea, heartburn, nausea and vomiting. Genitourinary: Negative for dysuria, frequency, hematuria and urgency. Musculoskeletal: Negative for joint pain and myalgias. Skin: Negative for itching and rash. Neurological: Negative for dizziness, seizures, weakness and headaches. Psychiatric/Behavioral: Negative for depression. The patient does not have insomnia. Objective:  
 
Visit Vitals /83 (BP Patient Position: Sitting) Pulse (!) 107 Temp 99.3 °F (37.4 °C) (Oral) Resp 20 Wt 81.6 kg (179 lb 12.8 oz) SpO2 97% BMI 29.92 kg/m² ECOG Performance Status (grade): 1 
0 - able to carry on all pre-disease activity w/out restriction 1 - restricted but able to carry out light work 2 - ambulatory and can self- care but unable to carry out work 3 - bed or chair >50% of waking hours 4 - completely disable, total care, confined to bed or chair Physical Exam 
Constitutional:   
   Appearance: Normal appearance. HENT:  
   Head: Normocephalic and atraumatic. Eyes:  
   Pupils: Pupils are equal, round, and reactive to light. Neck: Musculoskeletal: Neck supple. Cardiovascular:  
   Rate and Rhythm: Normal rate and regular rhythm. Heart sounds: Normal heart sounds. Pulmonary:  
   Effort: Pulmonary effort is normal.  
   Breath sounds: Normal breath sounds. Abdominal:  
   General: Bowel sounds are normal.  
   Palpations: Abdomen is soft. Tenderness: There is no abdominal tenderness. There is no guarding. Musculoskeletal: Normal range of motion. Right lower leg: No edema. Left lower leg: No edema. Comments: Left gluteal wound: clean, healing well, no redness or infected. Skin: 
   General: Skin is warm. Neurological:  
   General: No focal deficit present. Mental Status: She is alert and oriented to person, place, and time. Mental status is at baseline. Diagnostics: No results found for this or any previous visit (from the past 96 hour(s)). Imaging: No results found for this or any previous visit. No results found for this or any previous visit. Results for orders placed during the hospital encounter of 11/20/19 CT BX ABD MASS NDL PERC Narrative PREOPERATIVE DIAGNOSIS: Left gluteal mass. POSTOPERATIVE DIAGNOSIS: Same ATTENDING: GRAHAM Fontana: None. PROCEDURES: CT-guided core needle biopsy of the left gluteal muscle mass. ANESTHESIA: Local 1% lidocaine as well as moderate intravenous sedation with Versed and fentanyl given and monitored per independently trained interventional 
radiology nurse under my direct supervision for 30 minutes. Please see nursing 
records for detailed medication dosing. CONTRAST: None. COMPLICATIONS: None DRAIN: No 
 
CATHETER: None. EBL: Minimal. 
 
SPECIMEN: 10 core specimens were obtained. Given to pathology on site. Sampling 
was adequate. TECHNIQUE: After detailed explanation of risks and benefits of the procedure 
verbal and written consents were obtained. Patient was brought to the CT room 
and placed prone on the table. Left posterior gluteal region was prepped and 
draped in usual sterile fashion. Timeout was performed. Preoperative imaging was 
obtained. Target was localized. 1% lidocaine was used. Under direct CT fluoroscopic guidance with assistance of 18-gauge [place] 
needle 10 passes were made from the large increasing in size left gluteal mass 
lesion. 10 cores were placed in formalin solution.  Specimens were given to 
 pathology. Pathology was on site. Sampling was adequate. Postbiopsy imaging was 
obtained. Needle was removed. Hemostasis was achieved with manual compression and Gelfoam 
slurry. Sterile dressing was applied. There were no immediate complications. Patient tolerated procedure fairly well. Patient was transferred to recovery in stable condition. I was present and 
performed the procedure. FINDINGS: Fluoroscopic guidance demonstrated good position of the biopsy needle. Postbiopsy imaging demonstrates no evidence of hematoma. Impression IMPRESSION: 
 
Successful, uncomplicated CT-guided core needle biopsy of the enlarging left 
gluteal mass. All CT scans at this facility are performed using dose optimization technique as 
appropriate to a performed exam, to include automated exposure control, 
adjustment of the mA and/or kV according to patient size (including appropriate 
matching for site-specific examinations), or use of iterative reconstruction 
technique. Assessment: # Soft tissue mass, left gluteal area:  
# Undifferentiated pleomorphic sarcoma Plan: # Soft tissue mass, left gluteal area:  
# Undifferentiated pleomorphic sarcoma 
-- In February 2019 she had an ultrasound of the area and there was no evidence of a cystic component the area was described as heterogeneous measuring about 6.8 x 4.85 8.5 cm. An attempt to aspirate fluid from the left posterior hip/gluteal mass was unsuccessful. More recently she states that the mass has actually doubled in size and is now about the size of a softball.  
-- 4/5/2019. Liver, needle biopsy: mild to moderate chronic hepatitis (NOVA and BELLA score 2-3) with bridging fibrosis (nova and bella score 3); see comment. -- 11/20/2019. Lumbar mass, core biopsies: Soft tissue tumor with features consistent with sarcoma.  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 Necrosis: present. Histologic grade (fnclcc): grade 3. 
-- 1/31/2020: CT C/A/P: Significant interval increase in size of the mass within the posterior left buttock which now measures 14.1 cm, compared to 5.1 cm on prior examination. A new indeterminant moderate compression fracture is present in the T5 vertebral body. 
-- S.p Neoadjuvant XRT. (Sarahy Ennis) -- 6/11/2020 Left gluteal sarcoma resection - Dr. Natalie Galicia at [facility] orthopedic oncology department. Final pathology report was still pending at this point. Plan: 
-- The patient will follow up her Surgeon next week for final pathology and further plan. -- I have advised her to obtain medical records from [facility] for final diagnosis and recommendations. -- We will see the patient back in clinic in about 3 weeks. Always sooner if required. No orders of the defined types were placed in this encounter. Ms. Daphnie Smith has a reminder for a \"due or due soon\" health maintenance. I have asked that she contact her primary care provider for follow-up on this health maintenance. All of patient's questions answered to their apparent satisfaction. They verbally show understanding and agreement with aforementioned plan. Kleber Quiñones MD 
6/30/2020 About 25 minutes were spent for this encounter with more than 50% of the time spent in face-to-face counseling, discussing on diagnosis and management plan going forward, and co-ordination of care. Parts of this document has been produced using Dragon dictation system. Unrecognized errors in transcription may be present. Please do not hesitate to reach out for any questions or clarifications.  
 
 
CC: Dai Lizarraga MD

## 2020-07-23 ENCOUNTER — OFFICE VISIT (OUTPATIENT)
Dept: ONCOLOGY | Age: 65
End: 2020-07-23

## 2020-07-23 VITALS
DIASTOLIC BLOOD PRESSURE: 88 MMHG | OXYGEN SATURATION: 98 % | TEMPERATURE: 97.7 F | SYSTOLIC BLOOD PRESSURE: 144 MMHG | HEART RATE: 95 BPM | BODY MASS INDEX: 30.66 KG/M2 | HEIGHT: 65 IN | WEIGHT: 184 LBS

## 2020-07-23 DIAGNOSIS — C49.9 UNDIFFERENTIATED PLEOMORPHIC SARCOMA (HCC): ICD-10-CM

## 2020-07-23 DIAGNOSIS — D75.839 THROMBOCYTOSIS: ICD-10-CM

## 2020-07-23 DIAGNOSIS — C49.9 PRIMARY UNDIFFERENTIATED SARCOMA OF SOFT TISSUE (HCC): ICD-10-CM

## 2020-07-23 DIAGNOSIS — D64.9 NORMOCYTIC ANEMIA: Primary | ICD-10-CM

## 2020-07-23 RX ORDER — OXYCODONE HYDROCHLORIDE 5 MG/1
5 TABLET ORAL
COMMUNITY
End: 2021-01-01

## 2020-07-23 RX ORDER — OXYCODONE HYDROCHLORIDE 5 MG/1
TABLET ORAL
COMMUNITY
Start: 2020-06-14 | End: 2021-01-01

## 2020-07-23 NOTE — PROGRESS NOTES
Hematology/Oncology Note    Name: Anai Wray  Date: 2020  : 1955    Giles Mascorro MD       Oncology History:  Ms. Jonathan Lopez  is a 77 y.o. -American woman who has follow-up for her left gluteal mass. -- In 2019 she had an ultrasound of the area and there was no evidence of a cystic component the area was described as heterogeneous measuring about 6.8 x 4.85 8.5 cm. An attempt to aspirate fluid from the left posterior hip/gluteal mass was unsuccessful. More recently she states that the mass has actually doubled in size and is now about the size of a softball.   -- 2019. Liver, needle biopsy: mild to moderate chronic hepatitis (NOVA and MATI score 2-3) with bridging fibrosis (nova and mati score 3); see comment. -- 2019. Lumbar mass, core biopsies: Soft tissue tumor with features consistent with sarcoma. 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 Necrosis: present. Histologic grade (fnclcc): grade 3.  -- 2020: CT C/A/P: Significant interval increase in size of the mass within the posterior left buttock which now measures 14.1 cm, compared to 5.1 cm on prior examination. A new indeterminant moderate compression fracture is present in the T5 vertebral body. -- 2020 Left gluteal sarcoma resection        Subjective:   Chief complaint: Gluteal mass, sarcoma    History of present illness:  Ms. Jonathan Lopez is a 57-year-old -American woman who states that about a year ago she noticed a small masslike lesion over her left gluteal area. She did not think much of it. However over the past few months it has started to enlarge in size. She had mass biopsied which pathology reported sarcoma,undifferentiated pleomorphic . She had completed neoadjuvant radiation therapy. Subsequently she underwent resection on 2020 by Dr. Heike Lee. She stated the pathology report was still pending.  She will follow up her Surgeon next week for further plan. Today she reported her pain has been better after surgery. Her wound is healing well. Now she is able to walk. She has chronic SOB from long-standing COPD. She is oxygen dependent. She is an active smoker, currently 3-4 cigarettes a day. The patient otherwise has no other complaints. Denied fever, chills, night sweat, unintentional weight loss, skin lumps or bumps, acute bleeding or bruising issues. Denied headache, acute vision change, dizziness, chest pain, palpitation, productive cough, nausea, vomiting, abdominal pain, altered bowel habits, dysuria, new bone pain or back pain, focal numbness or weakness. Her follow-up CT will be rescheduled in 2 months, 9/14/2020.         Past Medical History:   Diagnosis Date    Acid reflux     Anxiety     Bipolar 1 disorder (HCC)     Bronchitis     Cirrhosis (HCC)     Coughing     Depression     DVT (deep vein thrombosis) in pregnancy     ETOH abuse     Gastritis     Hepatic encephalopathy (HCC)     Hepatitis     Joint pain     Leukocytosis     Lumbar disc disease     Shortness of breath     Stress     Teeth decayed     Vitamin D deficiency     Weakness of left leg        Allergies   Allergen Reactions    Aspirin Nausea and Vomiting       Past Surgical History:   Procedure Laterality Date    EGD      HAND/FINGER SURGERY UNLISTED      HX BACK SURGERY      HX OVARIAN CYST REMOVAL      HX SPLENECTOMY         Social History     Socioeconomic History    Marital status:      Spouse name: Not on file    Number of children: Not on file    Years of education: Not on file    Highest education level: Not on file   Occupational History    Not on file   Social Needs    Financial resource strain: Not on file    Food insecurity     Worry: Not on file     Inability: Not on file    Transportation needs     Medical: Not on file     Non-medical: Not on file   Tobacco Use    Smoking status: Current Every Day Smoker    Smokeless tobacco: Never Used   Substance and Sexual Activity    Alcohol use: Not Currently    Drug use: Yes     Types: Cocaine, Heroin    Sexual activity: Yes   Lifestyle    Physical activity     Days per week: Not on file     Minutes per session: Not on file    Stress: Not on file   Relationships    Social connections     Talks on phone: Not on file     Gets together: Not on file     Attends Confucianism service: Not on file     Active member of club or organization: Not on file     Attends meetings of clubs or organizations: Not on file     Relationship status: Not on file    Intimate partner violence     Fear of current or ex partner: Not on file     Emotionally abused: Not on file     Physically abused: Not on file     Forced sexual activity: Not on file   Other Topics Concern    Not on file   Social History Narrative    Not on file       Family History   Problem Relation Age of Onset    Heart Disease Mother     Stroke Mother     Heart Disease Father     Heart Disease Brother     Hypertension Brother        Current Outpatient Medications   Medication Sig Dispense Refill    mirtazapine (REMERON) 15 mg tablet TAKE 1 TABLET BY MOUTH EVERYDAY AT BEDTIME      silver sulfADIAZINE (SILVADENE) 1 % topical cream APPLY TO AFFECTED AREA 3 TIMES A DAY      diclofenac (VOLTAREN) 1 % gel APPLY 4 GM TO LEFT THIGH/BUTTOCKS 4 TIMES A DAY AS NEEDED FOR PAIN      ondansetron hcl (ZOFRAN) 4 mg tablet       QUEtiapine (SEROquel) 50 mg tablet TAKE 1 TABLET BY MOUTH AT BEDTIME      varenicline (Chantix Continuing Month Box) 1 mg tablet Take  by mouth.  ARIPiprazole (ABILIFY) 5 mg tablet       azithromycin (ZITHROMAX) 250 mg tablet TAKE 2 TABLETS BY MOUTH TODAY, THEN TAKE 1 TABLET DAILY FOR 4 DAYS  0    esomeprazole (NEXIUM) 20 mg capsule TAKE 20 MG BY MOUTH ONCE A DAY.       ADVAIR -21 mcg/actuation inhaler       hydrOXYzine HCl (ATARAX) 25 mg tablet       albuterol-ipratropium (DUO-NEB) 2.5 mg-0.5 mg/3 ml nebu PLEASE SEE ATTACHED FOR DETAILED DIRECTIONS  6    DAILY-GIORGIO tablet TAKE 1 TABLET BY MOUTH EVERY DAY      OLANZapine (ZYPREXA) 10 mg tablet       predniSONE (DELTASONE) 10 mg tablet Take 10 mg by mouth.  traZODone (DESYREL) 50 mg tablet TAKE 1 TABLET BY MOUTH EVERY NIGHT AT BEDTIME AS NEEDED FOR INSOMNIA  2    albuterol (PROVENTIL HFA, VENTOLIN HFA, PROAIR HFA) 90 mcg/actuation inhaler Take 2 Puffs by inhalation.  buprenorphine-naloxone (SUBOXONE) 8-2 mg film sublingaul film 1 Each by SubLINGual route.  SUBOXONE 8-2 mg film sublingaul film DISSOLVE 1 FILM UNDER THE TONGUE TWICE DAILY  0    buPROPion XL (WELLBUTRIN XL) 150 mg tablet TAKE 1 TABLET BY MOUTH EVERY DAY  3    busPIRone (BUSPAR) 7.5 mg tablet Take 7.5 mg by mouth.  ergocalciferol (ERGOCALCIFEROL) 50,000 unit capsule TAKE 1 CAP BY MOUTH EVERY FRIDAY. 1    Omega-3 Fatty Acids (FISH OIL) 500 mg cap Take  by mouth.  folic acid (FOLVITE) 1 mg tablet Take  by mouth daily.  hydrOXYzine pamoate (VISTARIL) 25 mg capsule Take 25 mg by mouth three (3) times daily as needed for Itching.  albuterol 2.5 mg /3 mL (0.083 %) nebu 3 mL, albuterol-ipratropium 2.5 mg-0.5 mg/3 ml nebu 3 mL 1 Dose by Nebulization route.  lactulose (KRISTALOSE) 10 gram packet Take 10 g by mouth three (3) times daily.  montelukast (SINGULAIR) 10 mg tablet Take 10 mg by mouth daily.  naproxen (NAPROSYN) 500 mg tablet Take 500 mg by mouth two (2) times daily (with meals).  nicotine (NICODERM CQ) 21 mg/24 hr 1 Patch by TransDERmal route every twenty-four (24) hours.  omeprazole (PRILOSEC) 40 mg capsule Take 40 mg by mouth daily.  spironolactone (ALDACTONE) 25 mg tablet Take  by mouth daily.  thiamine HCL (VITAMIN B-1) 100 mg tablet Take  by mouth daily.  tiotropium (SPIRIVA WITH HANDIHALER) 18 mcg inhalation capsule Take 1 Cap by inhalation daily.        Review of Systems   Constitutional: Negative for chills, diaphoresis, fever, malaise/fatigue and weight loss. Respiratory: Positive for shortness of breath (chronic SOB). Negative for cough, hemoptysis and wheezing. Cardiovascular: Negative for chest pain, palpitations and leg swelling. Gastrointestinal: Negative for abdominal pain, diarrhea, heartburn, nausea and vomiting. Genitourinary: Negative for dysuria, frequency, hematuria and urgency. Musculoskeletal: Negative for joint pain and myalgias. Skin: Negative for itching and rash. Neurological: Negative for dizziness, seizures, weakness and headaches. Psychiatric/Behavioral: Negative for depression. The patient does not have insomnia. Objective:     Visit Vitals  /88   Pulse 95   Temp 97.7 °F (36.5 °C)   Ht 5' 5\" (1.651 m)   Wt 83.5 kg (184 lb)   SpO2 98%   BMI 30.62 kg/m²       ECOG Performance Status (grade): 2  0 - able to carry on all pre-disease activity w/out restriction  1 - restricted but able to carry out light work  2 - ambulatory and can self- care but unable to carry out work  3 - bed or chair >50% of waking hours  4 - completely disable, total care, confined to bed or chair    Physical Exam  Constitutional:       Appearance: Normal appearance. Comments: On chronic oxygen   HENT:      Head: Normocephalic and atraumatic. Eyes:      Pupils: Pupils are equal, round, and reactive to light. Neck:      Musculoskeletal: Neck supple. Cardiovascular:      Rate and Rhythm: Normal rate and regular rhythm. Heart sounds: Normal heart sounds. Pulmonary:      Effort: Pulmonary effort is normal.      Breath sounds: Normal breath sounds. Abdominal:      General: Bowel sounds are normal.      Palpations: Abdomen is soft. Tenderness: There is no abdominal tenderness. There is no guarding. Musculoskeletal: Normal range of motion. Right lower leg: No edema. Left lower leg: No edema.       Comments: Left gluteal wound: clean, healing well, no redness or infected. Skin:     General: Skin is warm. Neurological:      General: No focal deficit present. Mental Status: She is alert and oriented to person, place, and time. Mental status is at baseline. Diagnostics:      No results found for this or any previous visit (from the past 96 hour(s)). Imaging:  No results found for this or any previous visit. No results found for this or any previous visit. Results for orders placed during the hospital encounter of 11/20/19   CT BX ABD MASS NDL PERC    Narrative PREOPERATIVE DIAGNOSIS: Left gluteal mass. POSTOPERATIVE DIAGNOSIS: Same    ATTENDING: Dr. Julissa Abraham M.D.    [facility]: None. PROCEDURES: CT-guided core needle biopsy of the left gluteal muscle mass. ANESTHESIA: Local 1% lidocaine as well as moderate intravenous sedation with  Versed and fentanyl given and monitored per independently trained interventional  radiology nurse under my direct supervision for 30 minutes. Please see nursing  records for detailed medication dosing. CONTRAST: None. COMPLICATIONS: None    DRAIN: No    CATHETER: None. EBL: Minimal.    SPECIMEN: 10 core specimens were obtained. Given to pathology on site. Sampling  was adequate. TECHNIQUE: After detailed explanation of risks and benefits of the procedure  verbal and written consents were obtained. Patient was brought to the CT room  and placed prone on the table. Left posterior gluteal region was prepped and  draped in usual sterile fashion. Timeout was performed. Preoperative imaging was  obtained. Target was localized. 1% lidocaine was used. Under direct CT fluoroscopic guidance with assistance of 18-gauge [place]  needle 10 passes were made from the large increasing in size left gluteal mass  lesion. 10 cores were placed in formalin solution. Specimens were given to  pathology. Pathology was on site. Sampling was adequate. Postbiopsy imaging was  obtained. Needle was removed. Hemostasis was achieved with manual compression and Gelfoam  slurry. Sterile dressing was applied. There were no immediate complications. Patient tolerated procedure fairly well. Patient was transferred to recovery in stable condition. I was present and  performed the procedure. FINDINGS: Fluoroscopic guidance demonstrated good position of the biopsy needle. Postbiopsy imaging demonstrates no evidence of hematoma. Impression IMPRESSION:    Successful, uncomplicated CT-guided core needle biopsy of the enlarging left  gluteal mass. All CT scans at this facility are performed using dose optimization technique as  appropriate to a performed exam, to include automated exposure control,  adjustment of the mA and/or kV according to patient size (including appropriate  matching for site-specific examinations), or use of iterative reconstruction  technique. Assessment:   # Soft tissue mass, left gluteal area:   # Undifferentiated pleomorphic sarcoma    Plan:   # Soft tissue mass, left gluteal area:   # Undifferentiated pleomorphic sarcoma  -- In February 2019 she had an ultrasound of the area and there was no evidence of a cystic component the area was described as heterogeneous measuring about 6.8 x 4.85 8.5 cm. An attempt to aspirate fluid from the left posterior hip/gluteal mass was unsuccessful. More recently she states that the mass has actually doubled in size and is now about the size of a softball.   -- 4/5/2019. Liver, needle biopsy: mild to moderate chronic hepatitis (NOVA and BELLA score 2-3) with bridging fibrosis (nova and bella score 3); see comment. -- 11/20/2019. Lumbar mass, core biopsies: Soft tissue tumor with features consistent with sarcoma. 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 Necrosis: present.  Histologic grade (fnclcc): grade 3.  -- 1/31/2020: CT C/A/P: Significant interval increase in size of the mass within the posterior left buttock which now measures 14.1 cm, compared to 5.1 cm on prior examination. A new indeterminant moderate compression fracture is present in the T5 vertebral body. Mild scarring is present in the lingula. -- S.p Neoadjuvant XRT. ([facility])  -- 6/11/2020 Left gluteal sarcoma resection - Dr. Fabrizio Harrington at [facility] orthopedic oncology department. Preliminary report faxed to us which stated pleomorphic sarcoma s/p resection. .    Plan:  -- Her follow-up Chest CT will be rescheduled in 2 months, 9/14/2020 for interval assessment of lung lesion. -- The patient will follow up her Surgeon as scheduled  -- We will see the patient back in clinic after next CT chest. Always sooner if required. # Chronic Anemia  -- Will obtain CBC, CMP, Iron profile, ferritin today      Orders Placed This Encounter    METABOLIC PANEL, COMPREHENSIVE     Standing Status:   Future     Number of Occurrences:   1     Standing Expiration Date:   7/24/2021    IRON PROFILE     Standing Status:   Future     Number of Occurrences:   1     Standing Expiration Date:   7/24/2021    FERRITIN     Standing Status:   Future     Number of Occurrences:   1     Standing Expiration Date:   7/23/2021    CBC WITH AUTOMATED DIFF     Standing Status:   Future     Number of Occurrences:   1     Standing Expiration Date:   7/24/2021    CBC WITH AUTOMATED DIFF    METABOLIC PANEL, COMPREHENSIVE    IRON PROFILE    FERRITIN    oxyCODONE IR (ROXICODONE) 5 mg immediate release tablet     Sig: Take 5 mg by mouth.  oxyCODONE IR (ROXICODONE) 5 mg immediate release tablet     Sig: PLEASE SEE ATTACHED FOR DETAILED DIRECTIONS           Ms. Clive Rivers has a reminder for a \"due or due soon\" health maintenance. I have asked that she contact her primary care provider for follow-up on this health maintenance. All of patient's questions answered to their apparent satisfaction. They verbally show understanding and agreement with aforementioned plan.          Josue MEJIA Do, MD  7/23/2020          About 25 minutes were spent for this encounter with more than 50% of the time spent in face-to-face counseling, discussing on diagnosis and management plan going forward, and co-ordination of care. Parts of this document has been produced using Dragon dictation system. Unrecognized errors in transcription may be present. Please do not hesitate to reach out for any questions or clarifications.       CC: Yuriy Lizarraga MD

## 2020-07-24 ENCOUNTER — TELEPHONE (OUTPATIENT)
Dept: ONCOLOGY | Age: 65
End: 2020-07-24

## 2020-07-24 LAB
ALBUMIN SERPL-MCNC: 3.7 G/DL (ref 3.8–4.8)
ALBUMIN/GLOB SERPL: 0.8 {RATIO} (ref 1.2–2.2)
ALP SERPL-CCNC: 224 IU/L (ref 39–117)
ALT SERPL-CCNC: 12 IU/L (ref 0–32)
AST SERPL-CCNC: 25 IU/L (ref 0–40)
BASOPHILS # BLD AUTO: 0.1 X10E3/UL (ref 0–0.2)
BASOPHILS NFR BLD AUTO: 1 %
BILIRUB SERPL-MCNC: 0.6 MG/DL (ref 0–1.2)
BUN SERPL-MCNC: 7 MG/DL (ref 8–27)
BUN/CREAT SERPL: 14 (ref 12–28)
CALCIUM SERPL-MCNC: 9 MG/DL (ref 8.7–10.3)
CHLORIDE SERPL-SCNC: 94 MMOL/L (ref 96–106)
CO2 SERPL-SCNC: 28 MMOL/L (ref 20–29)
CREAT SERPL-MCNC: 0.5 MG/DL (ref 0.57–1)
EOSINOPHIL # BLD AUTO: 0.8 X10E3/UL (ref 0–0.4)
EOSINOPHIL NFR BLD AUTO: 7 %
ERYTHROCYTE [DISTWIDTH] IN BLOOD BY AUTOMATED COUNT: 16.2 % (ref 11.7–15.4)
FERRITIN SERPL-MCNC: 229 NG/ML (ref 15–150)
GLOBULIN SER CALC-MCNC: 4.7 G/DL (ref 1.5–4.5)
GLUCOSE SERPL-MCNC: 104 MG/DL (ref 65–99)
HCT VFR BLD AUTO: 36.5 % (ref 34–46.6)
HGB BLD-MCNC: 12.7 G/DL (ref 11.1–15.9)
IMM GRANULOCYTES # BLD AUTO: 0 X10E3/UL (ref 0–0.1)
IMM GRANULOCYTES NFR BLD AUTO: 0 %
IRON SATN MFR SERPL: 9 % (ref 15–55)
IRON SERPL-MCNC: 28 UG/DL (ref 27–139)
LYMPHOCYTES # BLD AUTO: 1.8 X10E3/UL (ref 0.7–3.1)
LYMPHOCYTES NFR BLD AUTO: 16 %
MCH RBC QN AUTO: 30.6 PG (ref 26.6–33)
MCHC RBC AUTO-ENTMCNC: 34.8 G/DL (ref 31.5–35.7)
MCV RBC AUTO: 88 FL (ref 79–97)
MONOCYTES # BLD AUTO: 0.9 X10E3/UL (ref 0.1–0.9)
MONOCYTES NFR BLD AUTO: 8 %
NEUTROPHILS # BLD AUTO: 7.6 X10E3/UL (ref 1.4–7)
NEUTROPHILS NFR BLD AUTO: 68 %
PLATELET # BLD AUTO: 818 X10E3/UL (ref 150–450)
POTASSIUM SERPL-SCNC: 4.5 MMOL/L (ref 3.5–5.2)
PROT SERPL-MCNC: 8.4 G/DL (ref 6–8.5)
RBC # BLD AUTO: 4.15 X10E6/UL (ref 3.77–5.28)
SODIUM SERPL-SCNC: 138 MMOL/L (ref 134–144)
TIBC SERPL-MCNC: 298 UG/DL (ref 250–450)
UIBC SERPL-MCNC: 270 UG/DL (ref 118–369)
WBC # BLD AUTO: 11.2 X10E3/UL (ref 3.4–10.8)

## 2020-10-27 NOTE — PROGRESS NOTES
Hematology/Oncology Note Name: Alice Sharif Date: 10/27/2020 : 1955 Tamra Lizarraga MD  
 
 
Oncology History: Ms. Theodore Perry  is a 77 y.o. -American woman who has follow-up for her left gluteal mass. -- In 2019 she had an ultrasound of the area and there was no evidence of a cystic component the area was described as heterogeneous measuring about 6.8 x 4.85 8.5 cm. An attempt to aspirate fluid from the left posterior hip/gluteal mass was unsuccessful. More recently she states that the mass has actually doubled in size and is now about the size of a softball.  
-- 2019. Liver, needle biopsy: mild to moderate chronic hepatitis (NOVA and MATI score 2-3) with bridging fibrosis (nova and mati score 3); see comment. -- 2019. Lumbar mass, core biopsies: Soft tissue tumor with features consistent with sarcoma. 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 Necrosis: present. Histologic grade (fnclcc): grade 3. 
-- 2020: CT C/A/P: Significant interval increase in size of the mass within the posterior left buttock which now measures 14.1 cm, compared to 5.1 cm on prior examination. A new indeterminant moderate compression fracture is present in the T5 vertebral body. -- 2020 Left gluteal sarcoma resection Subjective: Chief complaint: Gluteal mass, sarcoma History of present illness: Ms. Theodore Perry is a 57-year-old -American woman who states that about a year ago she noticed a small masslike lesion over her left gluteal area. She did not think much of it. However over the past few months it has started to enlarge in size. She had mass biopsied which pathology reported sarcoma,undifferentiated pleomorphic. She had completed neoadjuvant radiation therapy. Subsequently she underwent resection on 2020 by Dr. Jazlyn Melendrez. Her recent CT at [facility] reported metastatic lung lesions.  She was seen at [facility] recently and suggested palliative chemotherapy. The patient would like to have therapy at [facility] closer to her house. Her daughter also considered to bring her to [facility] for cancer therapy. She has chronic SOB from long-standing COPD. She is oxygen dependent. She is an active smoker, currently 3-4 cigarettes a day. The patient otherwise has no other complaints. Denied fever, chills, night sweat, unintentional weight loss, skin lumps or bumps, acute bleeding or bruising issues. Denied headache, acute vision change, dizziness, chest pain, palpitation, productive cough, nausea, vomiting, abdominal pain, altered bowel habits, dysuria, new bone pain or back pain, focal numbness or weakness. Past Medical History:  
Diagnosis Date  Acid reflux  Anxiety  Bipolar 1 disorder ([facility] [address]  Bronchitis  Cirrhosis ([facility] [address]  Coughing  Depression  DVT (deep vein thrombosis) in pregnancy  ETOH abuse  Gastritis  Hepatic encephalopathy ([facility] [address]  Hepatitis  Joint pain  Leukocytosis  Lumbar disc disease  Shortness of breath  Stress  Teeth decayed  Vitamin D deficiency  Weakness of left leg Allergies Allergen Reactions  Aspirin Nausea and Vomiting Past Surgical History:  
Procedure Laterality Date  EGD  HAND/FINGER SURGERY UNLISTED  HX BACK SURGERY    
 HX OVARIAN CYST REMOVAL    
 HX SPLENECTOMY Social History Socioeconomic History  Marital status:  Spouse name: Not on file  Number of children: Not on file  Years of education: Not on file  Highest education level: Not on file Occupational History  Not on file Social Needs  Financial resource strain: Not on file  Food insecurity Worry: Not on file Inability: Not on file  Transportation needs Medical: Not on file Non-medical: Not on file Tobacco Use  Smoking status: Current Every Day Smoker  Smokeless tobacco: Never Used Substance and Sexual Activity  Alcohol use: Not Currently  Drug use: Yes Types: Cocaine, Heroin  Sexual activity: Yes Lifestyle  Physical activity Days per week: Not on file Minutes per session: Not on file  Stress: Not on file Relationships  Social connections Talks on phone: Not on file Gets together: Not on file Attends [employer] service: Not on file Active member of club or organization: Not on file Attends meetings of clubs or organizations: Not on file Relationship status: Not on file  Intimate partner violence Fear of current or ex partner: Not on file Emotionally abused: Not on file Physically abused: Not on file Forced sexual activity: Not on file Other Topics Concern  Not on file Social History Narrative  Not on file Family History Problem Relation Age of Onset  Heart Disease Mother  Stroke Mother  Heart Disease Father  Heart Disease Brother  Hypertension Brother Current Outpatient Medications Medication Sig Dispense Refill  clindamycin (CLEOCIN) 300 mg capsule TAKE ONE TABLET EVERY 6 HOURS FOR 10 DAYS  oxyCODONE IR (ROXICODONE) 5 mg immediate release tablet Take 5 mg by mouth.  oxyCODONE IR (ROXICODONE) 5 mg immediate release tablet PLEASE SEE ATTACHED FOR DETAILED DIRECTIONS  mirtazapine (REMERON) 15 mg tablet TAKE 1 TABLET BY MOUTH EVERYDAY AT BEDTIME  silver sulfADIAZINE (SILVADENE) 1 % topical cream APPLY TO AFFECTED AREA 3 TIMES A DAY  diclofenac (VOLTAREN) 1 % gel APPLY 4 GM TO LEFT THIGH/BUTTOCKS 4 TIMES A DAY AS NEEDED FOR PAIN    
 ondansetron hcl (ZOFRAN) 4 mg tablet  QUEtiapine (SEROquel) 50 mg tablet TAKE 1 TABLET BY MOUTH AT BEDTIME  varenicline (Chantix Continuing Month Box) 1 mg tablet Take  by mouth.  ARIPiprazole (ABILIFY) 5 mg tablet  azithromycin (ZITHROMAX) 250 mg tablet TAKE 2 TABLETS BY MOUTH TODAY, THEN TAKE 1 TABLET DAILY FOR 4 DAYS  0  
 esomeprazole (NEXIUM) 20 mg capsule TAKE 20 MG BY MOUTH ONCE A DAY.  ADVAIR -86 mcg/actuation inhaler  hydrOXYzine HCl (ATARAX) 25 mg tablet  albuterol-ipratropium (DUO-NEB) 2.5 mg-0.5 mg/3 ml nebu PLEASE SEE ATTACHED FOR DETAILED DIRECTIONS  6  
 DAILY-GIORGIO tablet TAKE 1 TABLET BY MOUTH EVERY DAY    
 OLANZapine (ZYPREXA) 10 mg tablet  predniSONE (DELTASONE) 10 mg tablet Take 10 mg by mouth.  traZODone (DESYREL) 50 mg tablet TAKE 1 TABLET BY MOUTH EVERY NIGHT AT BEDTIME AS NEEDED FOR INSOMNIA  2  
 albuterol (PROVENTIL HFA, VENTOLIN HFA, PROAIR HFA) 90 mcg/actuation inhaler Take 2 Puffs by inhalation.  buprenorphine-naloxone (SUBOXONE) 8-2 mg film sublingaul film 1 Each by SubLINGual route.  SUBOXONE 8-2 mg film sublingaul film DISSOLVE 1 FILM UNDER THE TONGUE TWICE DAILY  0  
 buPROPion XL (WELLBUTRIN XL) 150 mg tablet TAKE 1 TABLET BY MOUTH EVERY DAY  3  
 busPIRone (BUSPAR) 7.5 mg tablet Take 7.5 mg by mouth.  ergocalciferol (ERGOCALCIFEROL) 50,000 unit capsule TAKE 1 CAP BY MOUTH EVERY FRIDAY. 1  
 Omega-3 Fatty Acids (FISH OIL) 500 mg cap Take  by mouth.  folic acid (FOLVITE) 1 mg tablet Take  by mouth daily.  hydrOXYzine pamoate (VISTARIL) 25 mg capsule Take 25 mg by mouth three (3) times daily as needed for Itching.  albuterol 2.5 mg /3 mL (0.083 %) nebu 3 mL, albuterol-ipratropium 2.5 mg-0.5 mg/3 ml nebu 3 mL 1 Dose by Nebulization route.  lactulose (KRISTALOSE) 10 gram packet Take 10 g by mouth three (3) times daily.  montelukast (SINGULAIR) 10 mg tablet Take 10 mg by mouth daily.  naproxen (NAPROSYN) 500 mg tablet Take 500 mg by mouth two (2) times daily (with meals).  nicotine (NICODERM CQ) 21 mg/24 hr 1 Patch by TransDERmal route every twenty-four (24) hours.  omeprazole (PRILOSEC) 40 mg capsule Take 40 mg by mouth daily.  spironolactone (ALDACTONE) 25 mg tablet Take  by mouth daily.  thiamine HCL (VITAMIN B-1) 100 mg tablet Take  by mouth daily.  tiotropium (SPIRIVA WITH HANDIHALER) 18 mcg inhalation capsule Take 1 Cap by inhalation daily. Review of Systems Constitutional: Negative for chills, diaphoresis, fever, malaise/fatigue and weight loss. Respiratory: Positive for shortness of breath (chronic SOB). Negative for cough, hemoptysis and wheezing. Cardiovascular: Negative for chest pain, palpitations and leg swelling. Gastrointestinal: Negative for abdominal pain, diarrhea, heartburn, nausea and vomiting. Genitourinary: Negative for dysuria, frequency, hematuria and urgency. Musculoskeletal: Negative for joint pain and myalgias. Skin: Negative for itching and rash. Neurological: Negative for dizziness, seizures, weakness and headaches. Psychiatric/Behavioral: Negative for depression. The patient does not have insomnia. Objective:  
 
Visit Vitals /74 (BP Patient Position: Sitting) Pulse 88 Resp 16 Ht 5' 5\" (1.651 m) Wt 86.4 kg (190 lb 6.4 oz) SpO2 96% BMI 31.68 kg/m² ECOG Performance Status (grade): 2 
0 - able to carry on all pre-disease activity w/out restriction 1 - restricted but able to carry out light work 2 - ambulatory and can self- care but unable to carry out work 3 - bed or chair >50% of waking hours 4 - completely disable, total care, confined to bed or chair Physical Exam 
Constitutional:   
   Appearance: Normal appearance. Comments: On chronic oxygen HENT:  
   Head: Normocephalic and atraumatic. Eyes:  
   Pupils: Pupils are equal, round, and reactive to light. Neck: Musculoskeletal: Neck supple. Cardiovascular:  
   Rate and Rhythm: Normal rate and regular rhythm. Heart sounds: Normal heart sounds. Pulmonary: Effort: Pulmonary effort is normal.  
   Breath sounds: Normal breath sounds. Abdominal:  
   General: Bowel sounds are normal.  
   Palpations: Abdomen is soft. Tenderness: There is no abdominal tenderness. There is no guarding. Musculoskeletal: Normal range of motion. Right lower leg: No edema. Left lower leg: No edema. Comments: Left gluteal wound: clean, healing well, no redness or infected. Skin: 
   General: Skin is warm. Neurological:  
   General: No focal deficit present. Mental Status: She is alert and oriented to person, place, and time. Mental status is at baseline. Diagnostics: No results found for this or any previous visit (from the past 96 hour(s)). Imaging: No results found for this or any previous visit. No results found for this or any previous visit. Results for orders placed during the hospital encounter of 11/20/19 CT BX ABD MASS NDL PERC Narrative PREOPERATIVE DIAGNOSIS: Left gluteal mass. POSTOPERATIVE DIAGNOSIS: Same ATTENDING: GRAHAM Alicea: None. PROCEDURES: CT-guided core needle biopsy of the left gluteal muscle mass. ANESTHESIA: Local 1% lidocaine as well as moderate intravenous sedation with Versed and fentanyl given and monitored per independently trained interventional 
radiology nurse under my direct supervision for 30 minutes. Please see nursing 
records for detailed medication dosing. CONTRAST: None. COMPLICATIONS: None DRAIN: No 
 
CATHETER: None. EBL: Minimal. 
 
SPECIMEN: 10 core specimens were obtained. Given to pathology on site. Sampling 
was adequate. TECHNIQUE: After detailed explanation of risks and benefits of the procedure 
verbal and written consents were obtained. Patient was brought to the CT room 
and placed prone on the table. Left posterior gluteal region was prepped and 
draped in usual sterile fashion. Timeout was performed.  Preoperative imaging was 
obtained. Target was localized. 1% lidocaine was used. Under direct CT fluoroscopic guidance with assistance of 18-gauge [place] 
needle 10 passes were made from the large increasing in size left gluteal mass 
lesion. 10 cores were placed in formalin solution. Specimens were given to 
pathology. Pathology was on site. Sampling was adequate. Postbiopsy imaging was 
obtained. Needle was removed. Hemostasis was achieved with manual compression and Gelfoam 
slurry. Sterile dressing was applied. There were no immediate complications. Patient tolerated procedure fairly well. Patient was transferred to recovery in stable condition. I was present and 
performed the procedure. FINDINGS: Fluoroscopic guidance demonstrated good position of the biopsy needle. Postbiopsy imaging demonstrates no evidence of hematoma. Impression IMPRESSION: 
 
Successful, uncomplicated CT-guided core needle biopsy of the enlarging left 
gluteal mass. All CT scans at this facility are performed using dose optimization technique as 
appropriate to a performed exam, to include automated exposure control, 
adjustment of the mA and/or kV according to patient size (including appropriate 
matching for site-specific examinations), or use of iterative reconstruction 
technique. Assessment: # Soft tissue mass, left gluteal area:  
# Undifferentiated pleomorphic sarcoma # Lung metastases Plan: # Soft tissue mass, left gluteal area:  
# Undifferentiated pleomorphic sarcoma # Lung metastases -- In February 2019 she had an ultrasound of the area and there was no evidence of a cystic component the area was described as heterogeneous measuring about 6.8 x 4.85 8.5 cm. An attempt to aspirate fluid from the left posterior hip/gluteal mass was unsuccessful. More recently she states that the mass has actually doubled in size and is now about the size of a softball. -- 4/5/2019. Liver, needle biopsy: mild to moderate chronic hepatitis (NOVA and MATI score 2-3) with bridging fibrosis (nova and mati score 3); see comment. -- 11/20/2019. Lumbar mass, core biopsies: Soft tissue tumor with features consistent with sarcoma. 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 Necrosis: present. Histologic grade (fnclcc): grade 3. 
-- 1/31/2020: CT C/A/P: Significant interval increase in size of the mass within the posterior left buttock which now measures 14.1 cm, compared to 5.1 cm on prior examination. A new indeterminant moderate compression fracture is present in the T5 vertebral body. Mild scarring is present in the lingula. -- S.p Neoadjuvant XRT. (Khadra Fong) -- 6/11/2020 Left gluteal sarcoma resection - Dr. Jessica Gruber at [facility] orthopedic oncology department. Preliminary report faxed to us which stated pleomorphic sarcoma s/p resection. She was planned to obtain chest CT in 2 months for interval assessment of lung lesion. -- Her recent CT at [facility] reported metastatic lung lesions. She was seen at [facility] recently and suggested palliative chemotherapy. The patient would like to have therapy at [facility] closer to her house. Plan: 
-- We will obtain recent note from [facility] for pathology report, CT findings, and further recommendations. -- We will obtain NGS tumor profiling, liquid biopsy. -- I have explained to the patient and family that in metastatic sarcoma, chemotherapy will be administered with palliative intent, with the goals of decreasing tumor bulk, diminishing symptoms, improving quality of life, and prolonging survival. Palliative chemotherapy options could be considered as Gemcitabine, Docetaxel, etc. Given her current performance status with COPD/ chronic SOB on chronic oxygen and multiple medical issues, single agent chemotherapy would be preferred than combination therapy.  I also discussed about clinical trials which was not available at our center. We also discussed about palliative care. The patient was interested in palliative chemotherapy. Her daughter also considered to bring her to [facility] for cancer therapy. -- Will obtain Mediport placement. -- We will see the patient back in clinic in 2 weeks. Always sooner if required. # Chronic Anemia 
-- Will monitor CBC, CMP periodically Orders Placed This Encounter  IR INSERT TUNL CVC W PORT OVER 5 YEARS Standing Status:   Future Number of Occurrences:   1 Standing Expiration Date:   11/27/2021 Order Specific Question:   Transport Answer:   Wheelchair [7]  clindamycin (CLEOCIN) 300 mg capsule Sig: TAKE ONE TABLET EVERY 6 HOURS FOR 10 DAYS Ms. Janny Anaya has a reminder for a \"due or due soon\" health maintenance. I have asked that she contact her primary care provider for follow-up on this health maintenance. All of patient's questions answered to their apparent satisfaction. They verbally show understanding and agreement with aforementioned plan. Rogelio Boogie MD 
10/27/2020 About 40 minutes were spent for this encounter with more than 50% of the time spent in face-to-face counseling, discussing on diagnosis and management plan going forward, and co-ordination of care. Parts of this document has been produced using Dragon dictation system. Unrecognized errors in transcription may be present. Please do not hesitate to reach out for any questions or clarifications.  
 
 
CC: Quinn Lizarraga MD

## 2020-11-10 NOTE — PROGRESS NOTES
Anna Wharton w/[facility] called w/pts port placement appt for Mon. 11/16/2020 pt to arrive at 7:00am. Pt has been notified and good for appt.

## 2020-11-16 NOTE — TELEPHONE ENCOUNTER
Mariella GARCIA w/[facility]isha mathews on [address]  1955. She was there  for port placement but unable due to get this done due to open wound and elevated levels. Attempted to call Tisha back but was unable to reach the PA.

## 2020-11-19 NOTE — TELEPHONE ENCOUNTER
Spoke to Ms Kali Salinas, she was unable to get mediport placed due to her white blood count being high and they believe her hip is infected. Patient is scheduled on Monday to see the hip surgeon. She will contact me back after this appt to let me know what was stated and when she will be cleared for a mediport.

## 2020-11-24 NOTE — TELEPHONE ENCOUNTER
Spoke to Ms. Steve Jones today she states she has been cleared from the surgeon. I have faxed over a new order to [facility] for patient to get medi port placed. Also patient has requested she gets the CT scan done there as well. This order has also been faxed today.

## 2020-12-02 NOTE — TELEPHONE ENCOUNTER
Patient called our office to let us know she has not been scheduled for her medi port to be re done or the CT scan yet. I reached out to [facility] IR department they clammed they did not get the order from 11/24. So this has been sent to them today and confirmation has been received. Patient should be getting contacted to get scheduled today for the medi port. I also reached out to [facility] for the CT scan they reported they are still waiting on [employer] patients insurance to approve the scan. Once they hear back from patients insurance they will notify patient an get her scheduled.

## 2020-12-03 NOTE — TELEPHONE ENCOUNTER
Patient has been scheduled for her medi port on 12/8/2020 @ 9am    Patient has been scheduled for her CT scan on 12/9/2020 @ 330pm.    Both will take place at [facility].

## 2020-12-12 NOTE — PROGRESS NOTES
Hematology/Oncology Note Name: William Gomez Date: 2020 : 1955 Radha Lizarraga MD  
 
 
Oncology History: Ms. Betty Lyles  is a 77 y.o. -American woman who has follow-up for her left gluteal mass. -- In 2019 she had an ultrasound of the area and there was no evidence of a cystic component the area was described as heterogeneous measuring about 6.8 x 4.85 8.5 cm. An attempt to aspirate fluid from the left posterior hip/gluteal mass was unsuccessful. More recently she states that the mass has actually doubled in size and is now about the size of a softball.  
-- 2019. Liver, needle biopsy: mild to moderate chronic hepatitis (NOVA and MATI score 2-3) with bridging fibrosis (nova and mati score 3); see comment. -- 2019. Lumbar mass, core biopsies: Soft tissue tumor with features consistent with sarcoma. 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 Necrosis: present. Histologic grade (fnclcc): grade 3. 
-- 2020: CT C/A/P: Significant interval increase in size of the mass within the posterior left buttock which now measures 14.1 cm, compared to 5.1 cm on prior examination. A new indeterminant moderate compression fracture is present in the T5 vertebral body. -- 2020 Left gluteal sarcoma resection Subjective: Chief complaint: Gluteal mass, sarcoma History of present illness: Ms. Betty Lyles is a 75-year-old -American woman who states that about a year ago she noticed a small masslike lesion over her left gluteal area. She did not think much of it. However over the past few months it has started to enlarge in size. She had mass biopsied which pathology reported sarcoma,undifferentiated pleomorphic. She had completed neoadjuvant radiation therapy. Subsequently she underwent resection on 2020 by Dr. Dorina Paulson. Her recent CT at [address] [place] [address] reported metastatic lung lesions. She was seen at [address] [place] [address] recently and suggested palliative chemotherapy. The patient would like to have therapy at [facility] closer to her house. Her daughter also considered to bring her to [facility] for cancer therapy. Today she presented here for preparation of chemotherapy. She has port placed recently. She was not interested in trials or therapy at [facility]. She was accompanied by her son in the clinic, her daughter was also on the phone discussion during visit. She has chronic SOB from long-standing COPD. She is oxygen dependent. She is an active smoker, currently 3-4 cigarettes a day. The patient otherwise has no other complaints. Denied fever, chills, night sweat, unintentional weight loss, skin lumps or bumps, acute bleeding or bruising issues. Denied headache, acute vision change, dizziness, chest pain, palpitation, productive cough, nausea, vomiting, abdominal pain, altered bowel habits, dysuria, new bone pain or back pain, focal numbness or weakness. Past Medical History:  
Diagnosis Date  Acid reflux  Anxiety  Bipolar 1 disorder ([address]  Bronchitis  Cirrhosis ([address]  Coughing  Depression  DVT (deep vein thrombosis) in pregnancy  ETOH abuse  Gastritis  Hepatic encephalopathy ([facility] [address]  Hepatitis  Joint pain  Leukocytosis  Lumbar disc disease  Shortness of breath  Stress  Teeth decayed  Vitamin D deficiency  Weakness of left leg Allergies Allergen Reactions  Aspirin Nausea and Vomiting Past Surgical History:  
Procedure Laterality Date  EGD  HAND/FINGER SURGERY UNLISTED  HX BACK SURGERY    
 HX OVARIAN CYST REMOVAL    
 HX SPLENECTOMY Social History Socioeconomic History  Marital status:  Spouse name: Not on file  Number of children: Not on file  Years of education: Not on file  Highest education level: Not on file Occupational History  Not on file Social Needs  Financial resource strain: Not on file  Food insecurity Worry: Not on file Inability: Not on file  Transportation needs Medical: Not on file Non-medical: Not on file Tobacco Use  Smoking status: Current Every Day Smoker  Smokeless tobacco: Never Used Substance and Sexual Activity  Alcohol use: Not Currently  Drug use: Yes Types: Cocaine, Heroin  Sexual activity: Yes Lifestyle  Physical activity Days per week: Not on file Minutes per session: Not on file  Stress: Not on file Relationships  Social connections Talks on phone: Not on file Gets together: Not on file Attends [employer] service: Not on file Active member of club or organization: Not on file Attends meetings of clubs or organizations: Not on file Relationship status: Not on file  Intimate partner violence Fear of current or ex partner: Not on file Emotionally abused: Not on file Physically abused: Not on file Forced sexual activity: Not on file Other Topics Concern  Not on file Social History Narrative  Not on file Family History Problem Relation Age of Onset  Heart Disease Mother  Stroke Mother  Heart Disease Father  Heart Disease Brother  Hypertension Brother Current Outpatient Medications Medication Sig Dispense Refill  furosemide (LASIX) 40 mg tablet  levoFLOXacin (LEVAQUIN) 750 mg tablet PLEASE SEE ATTACHED FOR DETAILED DIRECTIONS  clindamycin (CLEOCIN) 300 mg capsule TAKE ONE TABLET EVERY 6 HOURS FOR 10 DAYS  oxyCODONE IR (ROXICODONE) 5 mg immediate release tablet Take 5 mg by mouth.  oxyCODONE IR (ROXICODONE) 5 mg immediate release tablet PLEASE SEE ATTACHED FOR DETAILED DIRECTIONS  mirtazapine (REMERON) 15 mg tablet TAKE 1 TABLET BY MOUTH EVERYDAY AT BEDTIME    
  silver sulfADIAZINE (SILVADENE) 1 % topical cream APPLY TO AFFECTED AREA 3 TIMES A DAY  diclofenac (VOLTAREN) 1 % gel APPLY 4 GM TO LEFT THIGH/BUTTOCKS 4 TIMES A DAY AS NEEDED FOR PAIN    
 ondansetron hcl (ZOFRAN) 4 mg tablet  QUEtiapine (SEROquel) 50 mg tablet TAKE 1 TABLET BY MOUTH AT BEDTIME  varenicline (Chantix Continuing Month Box) 1 mg tablet Take  by mouth.  ARIPiprazole (ABILIFY) 5 mg tablet  azithromycin (ZITHROMAX) 250 mg tablet TAKE 2 TABLETS BY MOUTH TODAY, THEN TAKE 1 TABLET DAILY FOR 4 DAYS  0  
 esomeprazole (NEXIUM) 20 mg capsule TAKE 20 MG BY MOUTH ONCE A DAY.  ADVAIR -67 mcg/actuation inhaler  hydrOXYzine HCl (ATARAX) 25 mg tablet  albuterol-ipratropium (DUO-NEB) 2.5 mg-0.5 mg/3 ml nebu PLEASE SEE ATTACHED FOR DETAILED DIRECTIONS  6  
 DAILY-GIORGIO tablet TAKE 1 TABLET BY MOUTH EVERY DAY    
 OLANZapine (ZYPREXA) 10 mg tablet  predniSONE (DELTASONE) 10 mg tablet Take 10 mg by mouth.  traZODone (DESYREL) 50 mg tablet TAKE 1 TABLET BY MOUTH EVERY NIGHT AT BEDTIME AS NEEDED FOR INSOMNIA  2  
 albuterol (PROVENTIL HFA, VENTOLIN HFA, PROAIR HFA) 90 mcg/actuation inhaler Take 2 Puffs by inhalation.  buprenorphine-naloxone (SUBOXONE) 8-2 mg film sublingaul film 1 Each by SubLINGual route.  SUBOXONE 8-2 mg film sublingaul film DISSOLVE 1 FILM UNDER THE TONGUE TWICE DAILY  0  
 buPROPion XL (WELLBUTRIN XL) 150 mg tablet TAKE 1 TABLET BY MOUTH EVERY DAY  3  
 busPIRone (BUSPAR) 7.5 mg tablet Take 7.5 mg by mouth.  ergocalciferol (ERGOCALCIFEROL) 50,000 unit capsule TAKE 1 CAP BY MOUTH EVERY FRIDAY. 1  
 Omega-3 Fatty Acids (FISH OIL) 500 mg cap Take  by mouth.  folic acid (FOLVITE) 1 mg tablet Take  by mouth daily.  hydrOXYzine pamoate (VISTARIL) 25 mg capsule Take 25 mg by mouth three (3) times daily as needed for Itching.  albuterol 2.5 mg /3 mL (0.083 %) nebu 3 mL, albuterol-ipratropium 2.5 mg-0.5 mg/3 ml nebu 3 mL 1 Dose by Nebulization route.  lactulose (KRISTALOSE) 10 gram packet Take 10 g by mouth three (3) times daily.  montelukast (SINGULAIR) 10 mg tablet Take 10 mg by mouth daily.  naproxen (NAPROSYN) 500 mg tablet Take 500 mg by mouth two (2) times daily (with meals).  nicotine (NICODERM CQ) 21 mg/24 hr 1 Patch by TransDERmal route every twenty-four (24) hours.  omeprazole (PRILOSEC) 40 mg capsule Take 40 mg by mouth daily.  spironolactone (ALDACTONE) 25 mg tablet Take  by mouth daily.  thiamine HCL (VITAMIN B-1) 100 mg tablet Take  by mouth daily.  tiotropium (SPIRIVA WITH HANDIHALER) 18 mcg inhalation capsule Take 1 Cap by inhalation daily. Review of Systems Constitutional: Negative for chills, diaphoresis, fever, malaise/fatigue and weight loss. Respiratory: Positive for shortness of breath (chronic SOB). Negative for cough, hemoptysis and wheezing. Cardiovascular: Negative for chest pain, palpitations and leg swelling. Gastrointestinal: Negative for abdominal pain, diarrhea, heartburn, nausea and vomiting. Genitourinary: Negative for dysuria, frequency, hematuria and urgency. Musculoskeletal: Negative for joint pain and myalgias. Skin: Negative for itching and rash. Neurological: Negative for dizziness, seizures, weakness and headaches. Psychiatric/Behavioral: Negative for depression. The patient does not have insomnia. Objective:  
 
Visit Vitals /79 Pulse 99 Resp 16 Ht 5' 5\" (1.651 m) SpO2 97% BMI 31.68 kg/m² ECOG Performance Status (grade): 2 
0 - able to carry on all pre-disease activity w/out restriction 1 - restricted but able to carry out light work 2 - ambulatory and can self- care but unable to carry out work 3 - bed or chair >50% of waking hours 4 - completely disable, total care, confined to bed or chair Physical Exam 
Constitutional:   
   Appearance: Normal appearance. Comments: On chronic oxygen HENT:  
   Head: Normocephalic and atraumatic. Eyes:  
   Pupils: Pupils are equal, round, and reactive to light. Neck: Musculoskeletal: Neck supple. Cardiovascular:  
   Rate and Rhythm: Normal rate and regular rhythm. Heart sounds: Normal heart sounds. Pulmonary:  
   Effort: Pulmonary effort is normal.  
   Breath sounds: Normal breath sounds. Abdominal:  
   General: Bowel sounds are normal.  
   Palpations: Abdomen is soft. Tenderness: There is no abdominal tenderness. There is no guarding. Musculoskeletal: Normal range of motion. Right lower leg: No edema. Left lower leg: No edema. Comments: Left gluteal wound: clean, healing well, no redness or infected. Skin: 
   General: Skin is warm. Neurological:  
   General: No focal deficit present. Mental Status: She is alert and oriented to person, place, and time. Mental status is at baseline. Diagnostics: No results found for this or any previous visit (from the past 96 hour(s)). Imaging: No results found for this or any previous visit. No results found for this or any previous visit. Results for orders placed in visit on 11/25/20 CT CHEST ABD PELV W CONT Assessment: # Soft tissue mass, left gluteal area:  
# Undifferentiated pleomorphic sarcoma # Lung metastases Plan: # Undifferentiated pleomorphic sarcoma # Lung metastases # Soft tissue mass, left gluteal area:  
-- In February 2019 she had an ultrasound of the area and there was no evidence of a cystic component the area was described as heterogeneous measuring about 6.8 x 4.85 8.5 cm. An attempt to aspirate fluid from the left posterior hip/gluteal mass was unsuccessful. More recently she states that the mass has actually doubled in size and is now about the size of a softball. -- 4/5/2019. Liver, needle biopsy: mild to moderate chronic hepatitis (NOVA and MATI score 2-3) with bridging fibrosis (nova and mati score 3); see comment. -- 11/20/2019. Lumbar mass, core biopsies: Soft tissue tumor with features consistent with sarcoma. 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 Necrosis: present. Histologic grade (fnclcc): grade 3. 
-- 1/31/2020: CT C/A/P: Significant interval increase in size of the mass within the posterior left buttock which now measures 14.1 cm, compared to 5.1 cm on prior examination. A new indeterminant moderate compression fracture is present in the T5 vertebral body. Mild scarring is present in the lingula. -- S.p Neoadjuvant XRT. ([address]) -- 6/11/2020 Left gluteal sarcoma resection - Dr. Sharath Turpin at [facility] orthopedic oncology department. Preliminary report faxed to us which stated pleomorphic sarcoma s/p resection. She was planned to obtain chest CT in 2 months for interval assessment of lung lesion. -- Her recent CT at [facility] reported metastatic lung lesions. She was seen at [facility] recently and suggested palliative chemotherapy. The patient would like to have therapy at [facility] closer to her house. -- 12/08/2020 s.p Port placement. -- 12/10/2020 CT of chest/A/P: Large left gluteal mass present on prior exam is no longer present. Residual soft tissue and skin changes likely represent scarring. Innumerable new bilateral pulmonary nodules consistent with metastatic disease. There may also be pleural metastasis along the right major fissure inferiorly. Single borderline abnormal left periaortic lymph node unchanged. No new abnormal adenopathy. Stable T5 compression fracture. No new bony abnormalities. -- Today I have discussed at length with the patient and family about chemotherapy options. The patient verbally understood her advanced stage and role of chemotherapy for palliative intent, with the goals of decreasing tumor bulk, diminishing symptoms, improving quality of life, and prolonging survival.  Given her performance status and chronic COPD on home oxygen, she was not interested in combination therapy. She preferred single agent chemotherapy options. I have recommended single agent Gemcitabine per current clinical guidelines. I have discussed the potential risk and benefits of therapy and alternative plans. She was agreeable with the plan. Chemotherapy consent was signed. Plan: 
-- We will start single agent Gemcitabine in 2-3 weeks. She would like to start therapy after holidays. -- We will obtain NGS tumor profiling, liquid biopsy. -- Labs: CBC, CMP Single agent Gemcitabine:  
Days 1 and 8: Gemcitabine 1,200mg/m2 IV over 90 to 120 minutes. Repeat cycle every 3 weeks. Growth factor support. # Chronic Anemia 
-- Will monitor CBC, CMP today and periodically Orders Placed This Encounter  METABOLIC PANEL, COMPREHENSIVE Standing Status:   Future Standing Expiration Date:   12/19/2021  CBC WITH AUTOMATED DIFF Standing Status:   Future Standing Expiration Date:   12/19/2021  furosemide (LASIX) 40 mg tablet  levoFLOXacin (LEVAQUIN) 750 mg tablet Sig: PLEASE SEE ATTACHED FOR DETAILED DIRECTIONS Ms. Aleena Bunn has a reminder for a \"due or due soon\" health maintenance. I have asked that she contact her primary care provider for follow-up on this health maintenance. All of patient's questions answered to their apparent satisfaction. They verbally show understanding and agreement with aforementioned plan. Iesha Salinas MD 
12/18/2020 About 40 minutes were spent for this encounter with more than 50% of the time spent in face-to-face counseling, discussing on diagnosis and management plan going forward, and co-ordination of care. Parts of this document has been produced using Dragon dictation system. Unrecognized errors in transcription may be present. Please do not hesitate to reach out for any questions or clarifications.  
 
 
CC: Julio Lizarraga MD

## 2020-12-28 PROBLEM — C79.9 METASTATIC CANCER (HCC): Status: ACTIVE | Noted: 2020-01-01

## 2021-01-01 ENCOUNTER — HOSPITAL ENCOUNTER (OUTPATIENT)
Dept: INFUSION THERAPY | Age: 66
Discharge: HOME | End: 2021-02-16
Payer: MEDICARE

## 2021-01-01 ENCOUNTER — HOSPITAL ENCOUNTER (OUTPATIENT)
Dept: INFUSION THERAPY | Age: 66
End: 2021-01-01

## 2021-01-01 ENCOUNTER — HOSPITAL ENCOUNTER (OUTPATIENT)
Dept: INFUSION THERAPY | Age: 66
Discharge: HOME | End: 2021-04-09
Payer: MEDICARE

## 2021-01-01 ENCOUNTER — APPOINTMENT (OUTPATIENT)
Dept: INFUSION THERAPY | Age: 66
End: 2021-01-01

## 2021-01-01 ENCOUNTER — TELEPHONE (OUTPATIENT)
Dept: ONCOLOGY | Age: 66
End: 2021-01-01

## 2021-01-01 ENCOUNTER — HOSPITAL ENCOUNTER (OUTPATIENT)
Dept: INFUSION THERAPY | Age: 66
Discharge: HOME | End: 2021-04-23
Payer: MEDICARE

## 2021-01-01 ENCOUNTER — OFFICE VISIT (OUTPATIENT)
Dept: ONCOLOGY | Age: 66
End: 2021-01-01
Payer: MEDICARE

## 2021-01-01 ENCOUNTER — HOSPITAL ENCOUNTER (OUTPATIENT)
Dept: INFUSION THERAPY | Age: 66
Discharge: HOME | End: 2021-04-20
Payer: MEDICARE

## 2021-01-01 ENCOUNTER — HOSPITAL ENCOUNTER (OUTPATIENT)
Dept: INFUSION THERAPY | Age: 66
Discharge: HOME | End: 2021-01-08
Payer: MEDICARE

## 2021-01-01 ENCOUNTER — HOSPITAL ENCOUNTER (OUTPATIENT)
Dept: INFUSION THERAPY | Age: 66
Discharge: HOME | End: 2021-06-25

## 2021-01-01 ENCOUNTER — HOSPITAL ENCOUNTER (OUTPATIENT)
Dept: INFUSION THERAPY | Age: 66
Discharge: HOME | End: 2021-05-21
Payer: MEDICARE

## 2021-01-01 ENCOUNTER — HOSPITAL ENCOUNTER (OUTPATIENT)
Dept: INFUSION THERAPY | Age: 66
Discharge: HOME | End: 2021-03-12
Payer: MEDICARE

## 2021-01-01 ENCOUNTER — HOSPITAL ENCOUNTER (OUTPATIENT)
Dept: INFUSION THERAPY | Age: 66
Discharge: HOME | End: 2021-07-09

## 2021-01-01 ENCOUNTER — HOSPITAL ENCOUNTER (OUTPATIENT)
Dept: INFUSION THERAPY | Age: 66
Discharge: HOME | End: 2021-07-23
Payer: MEDICARE

## 2021-01-01 ENCOUNTER — HOSPITAL ENCOUNTER (OUTPATIENT)
Dept: INFUSION THERAPY | Age: 66
Discharge: HOME | End: 2021-02-05
Payer: MEDICARE

## 2021-01-01 ENCOUNTER — HOSPITAL ENCOUNTER (OUTPATIENT)
Dept: INFUSION THERAPY | Age: 66
Discharge: HOME | End: 2021-07-30
Payer: MEDICARE

## 2021-01-01 ENCOUNTER — APPOINTMENT (OUTPATIENT)
Dept: INFUSION THERAPY | Age: 66
End: 2021-01-01
Payer: MEDICARE

## 2021-01-01 ENCOUNTER — HOSPITAL ENCOUNTER (OUTPATIENT)
Dept: INFUSION THERAPY | Age: 66
Discharge: HOME | End: 2021-04-30
Payer: MEDICARE

## 2021-01-01 ENCOUNTER — HOSPITAL ENCOUNTER (OUTPATIENT)
Dept: INFUSION THERAPY | Age: 66
Discharge: HOME | End: 2021-03-09
Payer: MEDICARE

## 2021-01-01 ENCOUNTER — HOSPITAL ENCOUNTER (OUTPATIENT)
Dept: INFUSION THERAPY | Age: 66
Discharge: HOME | End: 2021-01-29
Payer: MEDICARE

## 2021-01-01 ENCOUNTER — HOSPITAL ENCOUNTER (OUTPATIENT)
Dept: VASCULAR SURGERY | Age: 66
Discharge: HOME | End: 2021-05-05
Attending: INTERNAL MEDICINE
Payer: MEDICARE

## 2021-01-01 ENCOUNTER — HOSPITAL ENCOUNTER (OUTPATIENT)
Dept: INFUSION THERAPY | Age: 66
Discharge: HOME | End: 2021-07-02
Payer: MEDICARE

## 2021-01-01 ENCOUNTER — OFFICE VISIT (OUTPATIENT)
Dept: ONCOLOGY | Age: 66
End: 2021-01-01
Payer: MEDICAID

## 2021-01-01 ENCOUNTER — HOSPITAL ENCOUNTER (OUTPATIENT)
Dept: INFUSION THERAPY | Age: 66
Discharge: HOME | End: 2021-01-05
Payer: MEDICARE

## 2021-01-01 ENCOUNTER — HOSPITAL ENCOUNTER (OUTPATIENT)
Dept: INFUSION THERAPY | Age: 66
Discharge: HOME | End: 2021-07-01
Payer: MEDICARE

## 2021-01-01 ENCOUNTER — HOSPITAL ENCOUNTER (OUTPATIENT)
Dept: INFUSION THERAPY | Age: 66
Discharge: HOME | End: 2021-06-22
Payer: MEDICARE

## 2021-01-01 ENCOUNTER — HOSPITAL ENCOUNTER (OUTPATIENT)
Dept: INFUSION THERAPY | Age: 66
Discharge: HOME | End: 2021-03-30
Payer: MEDICARE

## 2021-01-01 ENCOUNTER — HOSPITAL ENCOUNTER (OUTPATIENT)
Dept: INFUSION THERAPY | Age: 66
Discharge: HOME | End: 2021-06-11
Payer: MEDICARE

## 2021-01-01 ENCOUNTER — HOSPITAL ENCOUNTER (OUTPATIENT)
Dept: INFUSION THERAPY | Age: 66
Discharge: HOME | End: 2021-07-20
Payer: MEDICARE

## 2021-01-01 ENCOUNTER — HOSPITAL ENCOUNTER (OUTPATIENT)
Dept: INFUSION THERAPY | Age: 66
Discharge: HOME | End: 2021-03-22
Payer: MEDICARE

## 2021-01-01 ENCOUNTER — HOSPITAL ENCOUNTER (OUTPATIENT)
Dept: INFUSION THERAPY | Age: 66
Discharge: HOME | End: 2021-01-26
Payer: MEDICARE

## 2021-01-01 ENCOUNTER — HOSPITAL ENCOUNTER (OUTPATIENT)
Dept: INFUSION THERAPY | Age: 66
Discharge: HOME | End: 2021-05-11
Payer: MEDICARE

## 2021-01-01 ENCOUNTER — OFFICE VISIT (OUTPATIENT)
Dept: ONCOLOGY | Age: 66
End: 2021-01-01

## 2021-01-01 ENCOUNTER — HOSPITAL ENCOUNTER (OUTPATIENT)
Dept: INFUSION THERAPY | Age: 66
Discharge: HOME | End: 2021-02-26
Payer: MEDICARE

## 2021-01-01 ENCOUNTER — HOSPITAL ENCOUNTER (OUTPATIENT)
Dept: INFUSION THERAPY | Age: 66
Discharge: HOME | End: 2021-04-02
Payer: MEDICARE

## 2021-01-01 ENCOUNTER — HOSPITAL ENCOUNTER (OUTPATIENT)
Dept: INFUSION THERAPY | Age: 66
Discharge: HOME | End: 2021-03-19
Payer: MEDICARE

## 2021-01-01 ENCOUNTER — HOSPITAL ENCOUNTER (OUTPATIENT)
Dept: INFUSION THERAPY | Age: 66
Discharge: HOME | End: 2021-05-14
Payer: MEDICARE

## 2021-01-01 ENCOUNTER — DOCUMENTATION ONLY (OUTPATIENT)
Dept: ONCOLOGY | Age: 66
End: 2021-01-01

## 2021-01-01 ENCOUNTER — HOSPITAL ENCOUNTER (OUTPATIENT)
Dept: INFUSION THERAPY | Age: 66
Discharge: HOME | End: 2021-06-04
Payer: MEDICARE

## 2021-01-01 ENCOUNTER — HOSPITAL ENCOUNTER (OUTPATIENT)
Dept: INFUSION THERAPY | Age: 66
End: 2021-01-01
Payer: MEDICARE

## 2021-01-01 ENCOUNTER — HOSPITAL ENCOUNTER (OUTPATIENT)
Dept: INFUSION THERAPY | Age: 66
Discharge: HOME | End: 2021-01-15
Payer: MEDICARE

## 2021-01-01 ENCOUNTER — HOSPITAL ENCOUNTER (OUTPATIENT)
Dept: INFUSION THERAPY | Age: 66
Discharge: HOME | End: 2021-02-19
Payer: MEDICARE

## 2021-01-01 VITALS
DIASTOLIC BLOOD PRESSURE: 69 MMHG | BODY MASS INDEX: 31.12 KG/M2 | RESPIRATION RATE: 20 BRPM | SYSTOLIC BLOOD PRESSURE: 110 MMHG | HEART RATE: 111 BPM | TEMPERATURE: 100 F | WEIGHT: 186.8 LBS | OXYGEN SATURATION: 96 % | HEIGHT: 65 IN

## 2021-01-01 VITALS
WEIGHT: 194.4 LBS | BODY MASS INDEX: 32.39 KG/M2 | HEIGHT: 65 IN | SYSTOLIC BLOOD PRESSURE: 116 MMHG | RESPIRATION RATE: 20 BRPM | DIASTOLIC BLOOD PRESSURE: 74 MMHG | HEART RATE: 105 BPM | OXYGEN SATURATION: 97 % | TEMPERATURE: 98.5 F

## 2021-01-01 VITALS
BODY MASS INDEX: 32.09 KG/M2 | OXYGEN SATURATION: 100 % | WEIGHT: 192.6 LBS | HEART RATE: 97 BPM | DIASTOLIC BLOOD PRESSURE: 64 MMHG | HEIGHT: 65 IN | SYSTOLIC BLOOD PRESSURE: 100 MMHG | TEMPERATURE: 98.1 F | RESPIRATION RATE: 20 BRPM

## 2021-01-01 VITALS
WEIGHT: 190.2 LBS | SYSTOLIC BLOOD PRESSURE: 105 MMHG | BODY MASS INDEX: 31.69 KG/M2 | DIASTOLIC BLOOD PRESSURE: 70 MMHG | HEART RATE: 90 BPM | RESPIRATION RATE: 18 BRPM | OXYGEN SATURATION: 95 % | TEMPERATURE: 97.3 F | HEIGHT: 65 IN

## 2021-01-01 VITALS
HEIGHT: 65 IN | OXYGEN SATURATION: 98 % | TEMPERATURE: 97.5 F | DIASTOLIC BLOOD PRESSURE: 62 MMHG | BODY MASS INDEX: 31.61 KG/M2 | WEIGHT: 189.7 LBS | RESPIRATION RATE: 20 BRPM | HEART RATE: 109 BPM | SYSTOLIC BLOOD PRESSURE: 107 MMHG

## 2021-01-01 VITALS
BODY MASS INDEX: 30.53 KG/M2 | SYSTOLIC BLOOD PRESSURE: 100 MMHG | OXYGEN SATURATION: 97 % | HEIGHT: 65 IN | TEMPERATURE: 97.3 F | HEART RATE: 90 BPM | RESPIRATION RATE: 18 BRPM | WEIGHT: 183.25 LBS | DIASTOLIC BLOOD PRESSURE: 74 MMHG

## 2021-01-01 VITALS
SYSTOLIC BLOOD PRESSURE: 132 MMHG | HEIGHT: 65 IN | OXYGEN SATURATION: 98 % | DIASTOLIC BLOOD PRESSURE: 83 MMHG | HEART RATE: 117 BPM | WEIGHT: 183.4 LBS | BODY MASS INDEX: 30.56 KG/M2 | TEMPERATURE: 97.9 F

## 2021-01-01 VITALS
TEMPERATURE: 99.6 F | OXYGEN SATURATION: 98 % | BODY MASS INDEX: 30.62 KG/M2 | DIASTOLIC BLOOD PRESSURE: 69 MMHG | WEIGHT: 184 LBS | HEART RATE: 106 BPM | SYSTOLIC BLOOD PRESSURE: 106 MMHG | RESPIRATION RATE: 18 BRPM

## 2021-01-01 VITALS
DIASTOLIC BLOOD PRESSURE: 63 MMHG | HEART RATE: 106 BPM | WEIGHT: 195 LBS | SYSTOLIC BLOOD PRESSURE: 104 MMHG | RESPIRATION RATE: 22 BRPM | TEMPERATURE: 98.1 F | OXYGEN SATURATION: 97 % | BODY MASS INDEX: 32.45 KG/M2

## 2021-01-01 VITALS
RESPIRATION RATE: 18 BRPM | HEART RATE: 82 BPM | BODY MASS INDEX: 30.26 KG/M2 | HEIGHT: 65 IN | DIASTOLIC BLOOD PRESSURE: 80 MMHG | RESPIRATION RATE: 20 BRPM | TEMPERATURE: 97.2 F | OXYGEN SATURATION: 100 % | WEIGHT: 181 LBS | OXYGEN SATURATION: 95 % | HEIGHT: 65 IN | TEMPERATURE: 99.1 F | WEIGHT: 181.6 LBS | BODY MASS INDEX: 30.16 KG/M2 | HEART RATE: 80 BPM | SYSTOLIC BLOOD PRESSURE: 129 MMHG

## 2021-01-01 VITALS
HEIGHT: 65 IN | TEMPERATURE: 99.4 F | DIASTOLIC BLOOD PRESSURE: 80 MMHG | TEMPERATURE: 98.4 F | WEIGHT: 181.2 LBS | SYSTOLIC BLOOD PRESSURE: 138 MMHG | HEIGHT: 65 IN | DIASTOLIC BLOOD PRESSURE: 84 MMHG | HEART RATE: 113 BPM | OXYGEN SATURATION: 100 % | BODY MASS INDEX: 30.19 KG/M2 | WEIGHT: 192.6 LBS | HEART RATE: 63 BPM | RESPIRATION RATE: 8 BRPM | OXYGEN SATURATION: 94 % | SYSTOLIC BLOOD PRESSURE: 126 MMHG | BODY MASS INDEX: 32.09 KG/M2

## 2021-01-01 VITALS
OXYGEN SATURATION: 96 % | HEIGHT: 65 IN | SYSTOLIC BLOOD PRESSURE: 97 MMHG | DIASTOLIC BLOOD PRESSURE: 69 MMHG | OXYGEN SATURATION: 99 % | DIASTOLIC BLOOD PRESSURE: 56 MMHG | SYSTOLIC BLOOD PRESSURE: 107 MMHG | HEART RATE: 97 BPM | RESPIRATION RATE: 20 BRPM | HEART RATE: 87 BPM | WEIGHT: 195.2 LBS | TEMPERATURE: 97.3 F | RESPIRATION RATE: 18 BRPM | BODY MASS INDEX: 32.52 KG/M2 | TEMPERATURE: 98.4 F

## 2021-01-01 VITALS
DIASTOLIC BLOOD PRESSURE: 78 MMHG | TEMPERATURE: 98.6 F | OXYGEN SATURATION: 98 % | RESPIRATION RATE: 20 BRPM | SYSTOLIC BLOOD PRESSURE: 127 MMHG | HEART RATE: 99 BPM

## 2021-01-01 VITALS
TEMPERATURE: 98 F | HEIGHT: 65 IN | DIASTOLIC BLOOD PRESSURE: 90 MMHG | BODY MASS INDEX: 30.52 KG/M2 | SYSTOLIC BLOOD PRESSURE: 122 MMHG | OXYGEN SATURATION: 99 % | WEIGHT: 183.2 LBS | HEART RATE: 101 BPM

## 2021-01-01 VITALS
HEART RATE: 96 BPM | TEMPERATURE: 99.2 F | OXYGEN SATURATION: 98 % | DIASTOLIC BLOOD PRESSURE: 63 MMHG | SYSTOLIC BLOOD PRESSURE: 103 MMHG | WEIGHT: 190.4 LBS | BODY MASS INDEX: 31.72 KG/M2 | HEIGHT: 65 IN

## 2021-01-01 VITALS
RESPIRATION RATE: 20 BRPM | OXYGEN SATURATION: 99 % | BODY MASS INDEX: 32.19 KG/M2 | HEART RATE: 83 BPM | TEMPERATURE: 97 F | HEIGHT: 65 IN | SYSTOLIC BLOOD PRESSURE: 109 MMHG | DIASTOLIC BLOOD PRESSURE: 64 MMHG | WEIGHT: 193.2 LBS

## 2021-01-01 VITALS
SYSTOLIC BLOOD PRESSURE: 113 MMHG | DIASTOLIC BLOOD PRESSURE: 70 MMHG | RESPIRATION RATE: 20 BRPM | OXYGEN SATURATION: 98 % | HEART RATE: 100 BPM | TEMPERATURE: 98.2 F

## 2021-01-01 VITALS
OXYGEN SATURATION: 96 % | SYSTOLIC BLOOD PRESSURE: 108 MMHG | HEIGHT: 65 IN | HEART RATE: 111 BPM | TEMPERATURE: 98.5 F | RESPIRATION RATE: 18 BRPM | BODY MASS INDEX: 31.75 KG/M2 | DIASTOLIC BLOOD PRESSURE: 64 MMHG

## 2021-01-01 VITALS
HEIGHT: 65 IN | OXYGEN SATURATION: 99 % | HEART RATE: 175 BPM | RESPIRATION RATE: 20 BRPM | TEMPERATURE: 97.6 F | SYSTOLIC BLOOD PRESSURE: 98 MMHG | WEIGHT: 229.7 LBS | BODY MASS INDEX: 38.27 KG/M2 | DIASTOLIC BLOOD PRESSURE: 70 MMHG

## 2021-01-01 VITALS
TEMPERATURE: 97.9 F | HEART RATE: 84 BPM | DIASTOLIC BLOOD PRESSURE: 69 MMHG | WEIGHT: 195 LBS | BODY MASS INDEX: 32.49 KG/M2 | HEIGHT: 65 IN | OXYGEN SATURATION: 100 % | SYSTOLIC BLOOD PRESSURE: 107 MMHG | RESPIRATION RATE: 18 BRPM

## 2021-01-01 VITALS
TEMPERATURE: 98.5 F | DIASTOLIC BLOOD PRESSURE: 82 MMHG | OXYGEN SATURATION: 96 % | BODY MASS INDEX: 29.85 KG/M2 | RESPIRATION RATE: 20 BRPM | HEART RATE: 77 BPM | SYSTOLIC BLOOD PRESSURE: 123 MMHG | WEIGHT: 179.2 LBS | HEIGHT: 65 IN

## 2021-01-01 VITALS
TEMPERATURE: 98.6 F | SYSTOLIC BLOOD PRESSURE: 109 MMHG | HEART RATE: 86 BPM | OXYGEN SATURATION: 100 % | RESPIRATION RATE: 18 BRPM | DIASTOLIC BLOOD PRESSURE: 70 MMHG

## 2021-01-01 VITALS
OXYGEN SATURATION: 100 % | WEIGHT: 183 LBS | DIASTOLIC BLOOD PRESSURE: 76 MMHG | SYSTOLIC BLOOD PRESSURE: 112 MMHG | HEART RATE: 128 BPM | BODY MASS INDEX: 30.49 KG/M2 | HEIGHT: 65 IN

## 2021-01-01 VITALS
DIASTOLIC BLOOD PRESSURE: 62 MMHG | HEART RATE: 100 BPM | OXYGEN SATURATION: 98 % | RESPIRATION RATE: 18 BRPM | TEMPERATURE: 98.3 F | SYSTOLIC BLOOD PRESSURE: 100 MMHG

## 2021-01-01 VITALS
OXYGEN SATURATION: 97 % | SYSTOLIC BLOOD PRESSURE: 95 MMHG | DIASTOLIC BLOOD PRESSURE: 56 MMHG | RESPIRATION RATE: 20 BRPM | TEMPERATURE: 97.8 F | HEART RATE: 92 BPM

## 2021-01-01 VITALS
HEIGHT: 65 IN | RESPIRATION RATE: 20 BRPM | SYSTOLIC BLOOD PRESSURE: 163 MMHG | OXYGEN SATURATION: 100 % | DIASTOLIC BLOOD PRESSURE: 91 MMHG | BODY MASS INDEX: 30.26 KG/M2 | WEIGHT: 181.6 LBS | HEART RATE: 79 BPM | TEMPERATURE: 98.3 F

## 2021-01-01 VITALS
OXYGEN SATURATION: 97 % | HEART RATE: 99 BPM | RESPIRATION RATE: 16 BRPM | TEMPERATURE: 98.5 F | SYSTOLIC BLOOD PRESSURE: 89 MMHG | DIASTOLIC BLOOD PRESSURE: 60 MMHG

## 2021-01-01 VITALS
SYSTOLIC BLOOD PRESSURE: 119 MMHG | BODY MASS INDEX: 31.79 KG/M2 | TEMPERATURE: 98.6 F | WEIGHT: 190.8 LBS | HEART RATE: 92 BPM | DIASTOLIC BLOOD PRESSURE: 75 MMHG | HEIGHT: 65 IN | OXYGEN SATURATION: 96 %

## 2021-01-01 VITALS
DIASTOLIC BLOOD PRESSURE: 62 MMHG | TEMPERATURE: 98.8 F | OXYGEN SATURATION: 98 % | RESPIRATION RATE: 22 BRPM | HEART RATE: 102 BPM | SYSTOLIC BLOOD PRESSURE: 93 MMHG

## 2021-01-01 VITALS
RESPIRATION RATE: 16 BRPM | HEART RATE: 94 BPM | TEMPERATURE: 99 F | BODY MASS INDEX: 31.79 KG/M2 | WEIGHT: 190.8 LBS | HEIGHT: 65 IN | DIASTOLIC BLOOD PRESSURE: 67 MMHG | SYSTOLIC BLOOD PRESSURE: 112 MMHG | OXYGEN SATURATION: 97 %

## 2021-01-01 VITALS
DIASTOLIC BLOOD PRESSURE: 63 MMHG | HEART RATE: 87 BPM | OXYGEN SATURATION: 95 % | SYSTOLIC BLOOD PRESSURE: 106 MMHG | RESPIRATION RATE: 18 BRPM | TEMPERATURE: 98.8 F

## 2021-01-01 VITALS
SYSTOLIC BLOOD PRESSURE: 110 MMHG | RESPIRATION RATE: 20 BRPM | HEART RATE: 108 BPM | DIASTOLIC BLOOD PRESSURE: 67 MMHG | TEMPERATURE: 97.7 F | OXYGEN SATURATION: 94 %

## 2021-01-01 VITALS
OXYGEN SATURATION: 99 % | HEIGHT: 65 IN | WEIGHT: 177 LBS | SYSTOLIC BLOOD PRESSURE: 125 MMHG | DIASTOLIC BLOOD PRESSURE: 88 MMHG | TEMPERATURE: 98.6 F | HEART RATE: 105 BPM | BODY MASS INDEX: 29.49 KG/M2

## 2021-01-01 VITALS
SYSTOLIC BLOOD PRESSURE: 107 MMHG | TEMPERATURE: 98.9 F | HEIGHT: 65 IN | BODY MASS INDEX: 31.92 KG/M2 | DIASTOLIC BLOOD PRESSURE: 70 MMHG | HEART RATE: 99 BPM | WEIGHT: 191.6 LBS | RESPIRATION RATE: 20 BRPM

## 2021-01-01 VITALS
OXYGEN SATURATION: 97 % | SYSTOLIC BLOOD PRESSURE: 104 MMHG | HEIGHT: 65 IN | DIASTOLIC BLOOD PRESSURE: 67 MMHG | WEIGHT: 190.8 LBS | HEART RATE: 98 BPM | TEMPERATURE: 98.6 F | BODY MASS INDEX: 31.79 KG/M2

## 2021-01-01 VITALS
HEART RATE: 93 BPM | RESPIRATION RATE: 20 BRPM | OXYGEN SATURATION: 95 % | TEMPERATURE: 97.5 F | SYSTOLIC BLOOD PRESSURE: 165 MMHG | DIASTOLIC BLOOD PRESSURE: 89 MMHG

## 2021-01-01 VITALS
DIASTOLIC BLOOD PRESSURE: 72 MMHG | WEIGHT: 190 LBS | HEIGHT: 65 IN | OXYGEN SATURATION: 96 % | BODY MASS INDEX: 31.65 KG/M2 | SYSTOLIC BLOOD PRESSURE: 113 MMHG | HEART RATE: 94 BPM | TEMPERATURE: 97.9 F

## 2021-01-01 DIAGNOSIS — D64.81 ANTINEOPLASTIC CHEMOTHERAPY INDUCED ANEMIA: ICD-10-CM

## 2021-01-01 DIAGNOSIS — D64.9 NORMOCYTIC ANEMIA: ICD-10-CM

## 2021-01-01 DIAGNOSIS — M79.89 LEG SWELLING: ICD-10-CM

## 2021-01-01 DIAGNOSIS — M89.8X9 BONE PAIN DUE TO G-CSF: ICD-10-CM

## 2021-01-01 DIAGNOSIS — C49.9 PRIMARY UNDIFFERENTIATED SARCOMA OF SOFT TISSUE (HCC): ICD-10-CM

## 2021-01-01 DIAGNOSIS — C49.9 UNDIFFERENTIATED PLEOMORPHIC SARCOMA (HCC): ICD-10-CM

## 2021-01-01 DIAGNOSIS — T45.1X5A ANTINEOPLASTIC CHEMOTHERAPY INDUCED ANEMIA: ICD-10-CM

## 2021-01-01 DIAGNOSIS — R63.0 POOR APPETITE: ICD-10-CM

## 2021-01-01 DIAGNOSIS — C79.9 METASTATIC MALIGNANT NEOPLASM, UNSPECIFIED SITE (HCC): Primary | ICD-10-CM

## 2021-01-01 DIAGNOSIS — G89.3 CANCER ASSOCIATED PAIN: ICD-10-CM

## 2021-01-01 DIAGNOSIS — C78.00 MALIGNANT NEOPLASM METASTATIC TO LUNG, UNSPECIFIED LATERALITY (HCC): ICD-10-CM

## 2021-01-01 DIAGNOSIS — D69.6 THROMBOCYTOPENIA (HCC): ICD-10-CM

## 2021-01-01 DIAGNOSIS — C49.9 UNDIFFERENTIATED PLEOMORPHIC SARCOMA (HCC): Primary | ICD-10-CM

## 2021-01-01 DIAGNOSIS — R22.42 LEG MASS, LEFT: ICD-10-CM

## 2021-01-01 DIAGNOSIS — C79.9 METASTATIC MALIGNANT NEOPLASM, UNSPECIFIED SITE (HCC): ICD-10-CM

## 2021-01-01 DIAGNOSIS — T14.8XXA BLISTERED SKIN: ICD-10-CM

## 2021-01-01 LAB
ABO + RH BLD: NORMAL
ALBUMIN SERPL-MCNC: 2.3 G/DL (ref 3.4–5)
ALBUMIN SERPL-MCNC: 2.4 G/DL (ref 3.4–5)
ALBUMIN SERPL-MCNC: 2.4 G/DL (ref 3.4–5)
ALBUMIN SERPL-MCNC: 2.5 G/DL (ref 3.4–5)
ALBUMIN SERPL-MCNC: 2.6 G/DL (ref 3.4–5)
ALBUMIN SERPL-MCNC: 2.7 G/DL (ref 3.4–5)
ALBUMIN SERPL-MCNC: 2.8 G/DL (ref 3.4–5)
ALBUMIN SERPL-MCNC: 2.9 G/DL (ref 3.4–5)
ALBUMIN SERPL-MCNC: 2.9 G/DL (ref 3.8–4.8)
ALBUMIN/GLOB SERPL: 0.4 {RATIO} (ref 0.8–1.7)
ALBUMIN/GLOB SERPL: 0.5 {RATIO} (ref 0.8–1.7)
ALBUMIN/GLOB SERPL: 0.6 {RATIO} (ref 1.2–2.2)
ALP SERPL-CCNC: 155 U/L (ref 45–117)
ALP SERPL-CCNC: 162 U/L (ref 45–117)
ALP SERPL-CCNC: 166 U/L (ref 45–117)
ALP SERPL-CCNC: 186 U/L (ref 45–117)
ALP SERPL-CCNC: 235 IU/L (ref 48–121)
ALP SERPL-CCNC: 248 U/L (ref 45–117)
ALP SERPL-CCNC: 253 U/L (ref 45–117)
ALP SERPL-CCNC: 270 U/L (ref 45–117)
ALP SERPL-CCNC: 281 U/L (ref 45–117)
ALP SERPL-CCNC: 282 U/L (ref 45–117)
ALP SERPL-CCNC: 337 U/L (ref 45–117)
ALT SERPL-CCNC: 16 U/L (ref 13–56)
ALT SERPL-CCNC: 19 U/L (ref 13–56)
ALT SERPL-CCNC: 20 U/L (ref 13–56)
ALT SERPL-CCNC: 24 IU/L (ref 0–32)
ALT SERPL-CCNC: 26 U/L (ref 13–56)
ALT SERPL-CCNC: 28 U/L (ref 13–56)
ALT SERPL-CCNC: 33 U/L (ref 13–56)
ALT SERPL-CCNC: 36 U/L (ref 13–56)
ALT SERPL-CCNC: 48 U/L (ref 13–56)
ANION GAP SERPL CALC-SCNC: 0 MMOL/L (ref 3–18)
ANION GAP SERPL CALC-SCNC: 1 MMOL/L (ref 3–18)
ANION GAP SERPL CALC-SCNC: 2 MMOL/L (ref 3–18)
ANION GAP SERPL CALC-SCNC: 3 MMOL/L (ref 3–18)
ANION GAP SERPL CALC-SCNC: 4 MMOL/L (ref 3–18)
ANION GAP SERPL CALC-SCNC: 5 MMOL/L (ref 3–18)
ANION GAP SERPL CALC-SCNC: 5 MMOL/L (ref 3–18)
ANION GAP SERPL CALC-SCNC: 6 MMOL/L (ref 3–18)
ANION GAP SERPL CALC-SCNC: 7 MMOL/L (ref 3–18)
ANION GAP SERPL CALC-SCNC: 7 MMOL/L (ref 3–18)
AST SERPL-CCNC: 19 U/L (ref 10–38)
AST SERPL-CCNC: 20 U/L (ref 10–38)
AST SERPL-CCNC: 20 U/L (ref 10–38)
AST SERPL-CCNC: 28 IU/L (ref 0–40)
AST SERPL-CCNC: 28 U/L (ref 10–38)
AST SERPL-CCNC: 37 U/L (ref 10–38)
AST SERPL-CCNC: 38 U/L (ref 10–38)
AST SERPL-CCNC: 38 U/L (ref 10–38)
AST SERPL-CCNC: 39 U/L (ref 10–38)
AST SERPL-CCNC: 44 U/L (ref 10–38)
AST SERPL-CCNC: 72 U/L (ref 10–38)
BASO+EOS+MONOS # BLD AUTO: 0.2 K/UL (ref 0–2.3)
BASO+EOS+MONOS # BLD AUTO: 0.3 K/UL (ref 0–2.3)
BASO+EOS+MONOS # BLD AUTO: 0.4 K/UL (ref 0–2.3)
BASO+EOS+MONOS # BLD AUTO: 0.6 K/UL (ref 0–2.3)
BASO+EOS+MONOS # BLD AUTO: 0.8 K/UL (ref 0–2.3)
BASO+EOS+MONOS # BLD AUTO: 1.5 K/UL (ref 0–2.3)
BASO+EOS+MONOS # BLD AUTO: 3.3 K/UL (ref 0–2.3)
BASO+EOS+MONOS NFR BLD AUTO: 10 % (ref 0.1–17)
BASO+EOS+MONOS NFR BLD AUTO: 11 % (ref 0.1–17)
BASO+EOS+MONOS NFR BLD AUTO: 12 % (ref 0.1–17)
BASO+EOS+MONOS NFR BLD AUTO: 12 % (ref 0.1–17)
BASO+EOS+MONOS NFR BLD AUTO: 25 % (ref 0.1–17)
BASO+EOS+MONOS NFR BLD AUTO: 30 % (ref 0.1–17)
BASO+EOS+MONOS NFR BLD AUTO: 31 % (ref 0.1–17)
BASOPHILS # BLD AUTO: 0.1 X10E3/UL (ref 0–0.2)
BASOPHILS # BLD: 0 K/UL (ref 0–0.06)
BASOPHILS # BLD: 0 K/UL (ref 0–0.1)
BASOPHILS # BLD: 0.1 K/UL (ref 0–0.1)
BASOPHILS # BLD: 0.2 K/UL (ref 0–0.06)
BASOPHILS NFR BLD AUTO: 1 %
BASOPHILS NFR BLD: 0 % (ref 0–2)
BASOPHILS NFR BLD: 0 % (ref 0–3)
BASOPHILS NFR BLD: 1 % (ref 0–2)
BASOPHILS NFR BLD: 2 % (ref 0–2)
BASOPHILS NFR BLD: 3 % (ref 0–2)
BASOPHILS NFR BLD: 3 % (ref 0–2)
BASOPHILS NFR BLD: 4 % (ref 0–2)
BASOPHILS NFR BLD: 5 % (ref 0–3)
BILIRUB SERPL-MCNC: 0.4 MG/DL (ref 0.2–1)
BILIRUB SERPL-MCNC: 0.5 MG/DL (ref 0.2–1)
BILIRUB SERPL-MCNC: 0.5 MG/DL (ref 0.2–1)
BILIRUB SERPL-MCNC: 0.6 MG/DL (ref 0.2–1)
BILIRUB SERPL-MCNC: 0.6 MG/DL (ref 0.2–1)
BILIRUB SERPL-MCNC: 0.6 MG/DL (ref 0–1.2)
BILIRUB SERPL-MCNC: 0.8 MG/DL (ref 0.2–1)
BLD PROD TYP BPU: NORMAL
BLOOD GROUP ANTIBODIES SERPL: NORMAL
BPU ID: NORMAL
BUN SERPL-MCNC: 10 MG/DL (ref 7–18)
BUN SERPL-MCNC: 10 MG/DL (ref 7–18)
BUN SERPL-MCNC: 11 MG/DL (ref 7–18)
BUN SERPL-MCNC: 11 MG/DL (ref 7–18)
BUN SERPL-MCNC: 11 MG/DL (ref 8–27)
BUN SERPL-MCNC: 13 MG/DL (ref 7–18)
BUN SERPL-MCNC: 17 MG/DL (ref 7–18)
BUN SERPL-MCNC: 6 MG/DL (ref 7–18)
BUN SERPL-MCNC: 8 MG/DL (ref 7–18)
BUN SERPL-MCNC: 9 MG/DL (ref 7–18)
BUN SERPL-MCNC: 9 MG/DL (ref 7–18)
BUN/CREAT SERPL: 10 (ref 12–20)
BUN/CREAT SERPL: 11 (ref 12–20)
BUN/CREAT SERPL: 13 (ref 12–20)
BUN/CREAT SERPL: 13 (ref 12–28)
BUN/CREAT SERPL: 14 (ref 12–20)
BUN/CREAT SERPL: 14 (ref 12–20)
BUN/CREAT SERPL: 15 (ref 12–20)
BUN/CREAT SERPL: 20 (ref 12–20)
BUN/CREAT SERPL: 7 (ref 12–20)
BUN/CREAT SERPL: 9 (ref 12–20)
BUN/CREAT SERPL: 9 (ref 12–20)
CALCIUM SERPL-MCNC: 7.8 MG/DL (ref 8.5–10.1)
CALCIUM SERPL-MCNC: 7.9 MG/DL (ref 8.5–10.1)
CALCIUM SERPL-MCNC: 8.1 MG/DL (ref 8.5–10.1)
CALCIUM SERPL-MCNC: 8.2 MG/DL (ref 8.5–10.1)
CALCIUM SERPL-MCNC: 8.2 MG/DL (ref 8.5–10.1)
CALCIUM SERPL-MCNC: 8.4 MG/DL (ref 8.7–10.3)
CALCIUM SERPL-MCNC: 8.6 MG/DL (ref 8.5–10.1)
CALCIUM SERPL-MCNC: 8.7 MG/DL (ref 8.5–10.1)
CALCIUM SERPL-MCNC: 9.6 MG/DL (ref 8.5–10.1)
CHLORIDE SERPL-SCNC: 100 MMOL/L (ref 100–111)
CHLORIDE SERPL-SCNC: 101 MMOL/L (ref 100–111)
CHLORIDE SERPL-SCNC: 92 MMOL/L (ref 96–106)
CHLORIDE SERPL-SCNC: 95 MMOL/L (ref 100–111)
CHLORIDE SERPL-SCNC: 97 MMOL/L (ref 100–111)
CHLORIDE SERPL-SCNC: 98 MMOL/L (ref 100–111)
CHLORIDE SERPL-SCNC: 98 MMOL/L (ref 100–111)
CHLORIDE SERPL-SCNC: 99 MMOL/L (ref 100–111)
CO2 SERPL-SCNC: 29 MMOL/L (ref 21–32)
CO2 SERPL-SCNC: 31 MMOL/L (ref 20–29)
CO2 SERPL-SCNC: 31 MMOL/L (ref 21–32)
CO2 SERPL-SCNC: 32 MMOL/L (ref 21–32)
CO2 SERPL-SCNC: 33 MMOL/L (ref 21–32)
CO2 SERPL-SCNC: 33 MMOL/L (ref 21–32)
CO2 SERPL-SCNC: 35 MMOL/L (ref 21–32)
CO2 SERPL-SCNC: 35 MMOL/L (ref 21–32)
CO2 SERPL-SCNC: 36 MMOL/L (ref 21–32)
CO2 SERPL-SCNC: 37 MMOL/L (ref 21–32)
CO2 SERPL-SCNC: 37 MMOL/L (ref 21–32)
CREAT SERPL-MCNC: 0.69 MG/DL (ref 0.6–1.3)
CREAT SERPL-MCNC: 0.75 MG/DL (ref 0.6–1.3)
CREAT SERPL-MCNC: 0.77 MG/DL (ref 0.6–1.3)
CREAT SERPL-MCNC: 0.82 MG/DL (ref 0.6–1.3)
CREAT SERPL-MCNC: 0.84 MG/DL (ref 0.6–1.3)
CREAT SERPL-MCNC: 0.84 MG/DL (ref 0.6–1.3)
CREAT SERPL-MCNC: 0.88 MG/DL (ref 0.57–1)
CREAT SERPL-MCNC: 0.95 MG/DL (ref 0.6–1.3)
CREAT SERPL-MCNC: 1.01 MG/DL (ref 0.6–1.3)
CREAT SERPL-MCNC: 1.01 MG/DL (ref 0.6–1.3)
CREAT SERPL-MCNC: 1.04 MG/DL (ref 0.6–1.3)
CROSSMATCH RESULT,%XM: NORMAL
DIFFERENTIAL METHOD BLD: ABNORMAL
EOSINOPHIL # BLD AUTO: 0.5 X10E3/UL (ref 0–0.4)
EOSINOPHIL # BLD: 0 K/UL (ref 0–0.4)
EOSINOPHIL # BLD: 0.1 K/UL (ref 0–0.4)
EOSINOPHIL # BLD: 0.2 K/UL (ref 0–0.4)
EOSINOPHIL # BLD: 0.3 K/UL (ref 0–0.4)
EOSINOPHIL # BLD: 0.4 K/UL (ref 0–0.4)
EOSINOPHIL # BLD: 0.5 K/UL (ref 0–0.4)
EOSINOPHIL # BLD: 0.7 K/UL (ref 0–0.4)
EOSINOPHIL # BLD: 0.8 K/UL (ref 0–0.4)
EOSINOPHIL # BLD: 1.8 K/UL (ref 0–0.4)
EOSINOPHIL NFR BLD AUTO: 6 %
EOSINOPHIL NFR BLD: 0 % (ref 0–5)
EOSINOPHIL NFR BLD: 1 % (ref 0–5)
EOSINOPHIL NFR BLD: 1 % (ref 0–5)
EOSINOPHIL NFR BLD: 14 % (ref 0–5)
EOSINOPHIL NFR BLD: 2 % (ref 0–5)
EOSINOPHIL NFR BLD: 4 % (ref 0–5)
EOSINOPHIL NFR BLD: 4 % (ref 0–5)
EOSINOPHIL NFR BLD: 5 % (ref 0–5)
EOSINOPHIL NFR BLD: 5 % (ref 0–5)
EOSINOPHIL NFR BLD: 6 % (ref 0–5)
EOSINOPHIL NFR BLD: 6 % (ref 0–5)
EPO SERPL-ACNC: 432.9 MIU/ML (ref 2.6–18.5)
ERYTHROCYTE [DISTWIDTH] IN BLOOD BY AUTOMATED COUNT: 13.3 % (ref 11.5–14.5)
ERYTHROCYTE [DISTWIDTH] IN BLOOD BY AUTOMATED COUNT: 14.8 % (ref 11.6–14.5)
ERYTHROCYTE [DISTWIDTH] IN BLOOD BY AUTOMATED COUNT: 14.8 % (ref 11.6–14.5)
ERYTHROCYTE [DISTWIDTH] IN BLOOD BY AUTOMATED COUNT: 14.9 % (ref 11.5–14.5)
ERYTHROCYTE [DISTWIDTH] IN BLOOD BY AUTOMATED COUNT: 15 % (ref 11.5–14.5)
ERYTHROCYTE [DISTWIDTH] IN BLOOD BY AUTOMATED COUNT: 17.1 % (ref 11.7–15.4)
ERYTHROCYTE [DISTWIDTH] IN BLOOD BY AUTOMATED COUNT: 17.4 % (ref 11.5–14.5)
ERYTHROCYTE [DISTWIDTH] IN BLOOD BY AUTOMATED COUNT: 17.8 % (ref 11.6–14.5)
ERYTHROCYTE [DISTWIDTH] IN BLOOD BY AUTOMATED COUNT: 17.8 % (ref 11.6–14.5)
ERYTHROCYTE [DISTWIDTH] IN BLOOD BY AUTOMATED COUNT: 19 % (ref 11.5–14.5)
ERYTHROCYTE [DISTWIDTH] IN BLOOD BY AUTOMATED COUNT: 19.1 % (ref 11.5–14.5)
ERYTHROCYTE [DISTWIDTH] IN BLOOD BY AUTOMATED COUNT: 19.5 % (ref 11.6–14.5)
ERYTHROCYTE [DISTWIDTH] IN BLOOD BY AUTOMATED COUNT: 19.8 % (ref 11.6–14.5)
ERYTHROCYTE [DISTWIDTH] IN BLOOD BY AUTOMATED COUNT: 19.9 % (ref 11.6–14.5)
ERYTHROCYTE [DISTWIDTH] IN BLOOD BY AUTOMATED COUNT: 20.1 % (ref 11.6–14.5)
ERYTHROCYTE [DISTWIDTH] IN BLOOD BY AUTOMATED COUNT: 20.2 % (ref 11.6–14.5)
ERYTHROCYTE [DISTWIDTH] IN BLOOD BY AUTOMATED COUNT: 21 % (ref 11.6–14.5)
ERYTHROCYTE [DISTWIDTH] IN BLOOD BY AUTOMATED COUNT: 21.8 % (ref 11.5–14.5)
ERYTHROCYTE [DISTWIDTH] IN BLOOD BY AUTOMATED COUNT: 21.8 % (ref 11.6–14.5)
ERYTHROCYTE [DISTWIDTH] IN BLOOD BY AUTOMATED COUNT: 22.7 % (ref 11.6–14.5)
ERYTHROCYTE [DISTWIDTH] IN BLOOD BY AUTOMATED COUNT: 22.9 % (ref 11.6–14.5)
ERYTHROCYTE [DISTWIDTH] IN BLOOD BY AUTOMATED COUNT: 24.2 % (ref 11.6–14.5)
ERYTHROCYTE [DISTWIDTH] IN BLOOD BY AUTOMATED COUNT: 24.3 % (ref 11.6–14.5)
FERRITIN SERPL-MCNC: 1267 NG/ML (ref 15–150)
FOLATE SERPL-MCNC: 18.7 NG/ML
GLOBULIN SER CALC-MCNC: 4.7 G/DL (ref 1.5–4.5)
GLOBULIN SER CALC-MCNC: 5.3 G/DL (ref 2–4)
GLOBULIN SER CALC-MCNC: 5.3 G/DL (ref 2–4)
GLOBULIN SER CALC-MCNC: 5.5 G/DL (ref 2–4)
GLOBULIN SER CALC-MCNC: 5.6 G/DL (ref 2–4)
GLOBULIN SER CALC-MCNC: 5.6 G/DL (ref 2–4)
GLOBULIN SER CALC-MCNC: 5.7 G/DL (ref 2–4)
GLOBULIN SER CALC-MCNC: 5.9 G/DL (ref 2–4)
GLOBULIN SER CALC-MCNC: 5.9 G/DL (ref 2–4)
GLOBULIN SER CALC-MCNC: 6 G/DL (ref 2–4)
GLOBULIN SER CALC-MCNC: 6.2 G/DL (ref 2–4)
GLUCOSE SERPL-MCNC: 101 MG/DL (ref 74–99)
GLUCOSE SERPL-MCNC: 111 MG/DL (ref 74–99)
GLUCOSE SERPL-MCNC: 120 MG/DL (ref 74–99)
GLUCOSE SERPL-MCNC: 120 MG/DL (ref 74–99)
GLUCOSE SERPL-MCNC: 123 MG/DL (ref 74–99)
GLUCOSE SERPL-MCNC: 125 MG/DL (ref 65–99)
GLUCOSE SERPL-MCNC: 132 MG/DL (ref 74–99)
GLUCOSE SERPL-MCNC: 135 MG/DL (ref 74–99)
GLUCOSE SERPL-MCNC: 168 MG/DL (ref 74–99)
GLUCOSE SERPL-MCNC: 83 MG/DL (ref 74–99)
GLUCOSE SERPL-MCNC: 90 MG/DL (ref 74–99)
HCT VFR BLD AUTO: 22.6 % (ref 36–48)
HCT VFR BLD AUTO: 23.5 % (ref 35–45)
HCT VFR BLD AUTO: 24 % (ref 36–48)
HCT VFR BLD AUTO: 25.2 % (ref 35–45)
HCT VFR BLD AUTO: 25.7 % (ref 35–45)
HCT VFR BLD AUTO: 26.7 % (ref 35–45)
HCT VFR BLD AUTO: 27.6 % (ref 35–45)
HCT VFR BLD AUTO: 27.9 % (ref 35–45)
HCT VFR BLD AUTO: 27.9 % (ref 36–48)
HCT VFR BLD AUTO: 28.7 % (ref 35–45)
HCT VFR BLD AUTO: 29.4 % (ref 34–46.6)
HCT VFR BLD AUTO: 29.7 % (ref 35–45)
HCT VFR BLD AUTO: 30.7 % (ref 36–48)
HCT VFR BLD AUTO: 30.7 % (ref 36–48)
HCT VFR BLD AUTO: 31.7 % (ref 35–45)
HCT VFR BLD AUTO: 32.4 % (ref 35–45)
HCT VFR BLD AUTO: 32.8 % (ref 35–45)
HCT VFR BLD AUTO: 33.2 % (ref 36–48)
HCT VFR BLD AUTO: 34.2 % (ref 35–45)
HCT VFR BLD AUTO: 34.3 % (ref 35–45)
HCT VFR BLD AUTO: 34.5 % (ref 36–48)
HCT VFR BLD AUTO: 37.2 % (ref 35–45)
HCT VFR BLD AUTO: 38.6 % (ref 35–45)
HGB BLD-MCNC: 10.2 G/DL (ref 11.1–15.9)
HGB BLD-MCNC: 10.3 G/DL (ref 12–16)
HGB BLD-MCNC: 10.5 G/DL (ref 12–16)
HGB BLD-MCNC: 10.5 G/DL (ref 12–16)
HGB BLD-MCNC: 10.6 G/DL (ref 12–16)
HGB BLD-MCNC: 10.8 G/DL (ref 12–16)
HGB BLD-MCNC: 11 G/DL (ref 12–16)
HGB BLD-MCNC: 11.2 G/DL (ref 12–16)
HGB BLD-MCNC: 11.3 G/DL (ref 12–16)
HGB BLD-MCNC: 11.6 G/DL (ref 12–16)
HGB BLD-MCNC: 12 G/DL (ref 12–16)
HGB BLD-MCNC: 13.4 G/DL (ref 12–16)
HGB BLD-MCNC: 7.7 G/DL (ref 12–16)
HGB BLD-MCNC: 7.8 G/DL (ref 12–16)
HGB BLD-MCNC: 7.8 G/DL (ref 12–16)
HGB BLD-MCNC: 8.3 G/DL (ref 12–16)
HGB BLD-MCNC: 8.3 G/DL (ref 12–16)
HGB BLD-MCNC: 8.8 G/DL (ref 12–16)
HGB BLD-MCNC: 8.9 G/DL (ref 12–16)
HGB BLD-MCNC: 9.1 G/DL (ref 12–16)
HGB BLD-MCNC: 9.2 G/DL (ref 12–16)
HGB BLD-MCNC: 9.4 G/DL (ref 12–16)
HGB BLD-MCNC: 9.9 G/DL (ref 12–16)
HISTORY CHECKED?,CKHIST: NORMAL
IMM GRANULOCYTES # BLD AUTO: 0 X10E3/UL (ref 0–0.1)
IMM GRANULOCYTES NFR BLD AUTO: 0 %
IRON SATN MFR SERPL: >93 % (ref 15–55)
IRON SERPL-MCNC: 222 UG/DL (ref 27–139)
LYMPHOCYTES # BLD AUTO: 1.5 X10E3/UL (ref 0.7–3.1)
LYMPHOCYTES # BLD: 0.3 K/UL (ref 1.1–5.9)
LYMPHOCYTES # BLD: 0.6 K/UL (ref 0.9–3.6)
LYMPHOCYTES # BLD: 1 K/UL (ref 0.9–3.6)
LYMPHOCYTES # BLD: 1.1 K/UL (ref 1.1–5.9)
LYMPHOCYTES # BLD: 1.2 K/UL (ref 0.9–3.6)
LYMPHOCYTES # BLD: 1.2 K/UL (ref 0.9–3.6)
LYMPHOCYTES # BLD: 1.3 K/UL (ref 1.1–5.9)
LYMPHOCYTES # BLD: 1.4 K/UL (ref 0.8–3.5)
LYMPHOCYTES # BLD: 1.4 K/UL (ref 0.9–3.6)
LYMPHOCYTES # BLD: 1.4 K/UL (ref 1.1–5.9)
LYMPHOCYTES # BLD: 1.6 K/UL (ref 0.9–3.6)
LYMPHOCYTES # BLD: 1.7 K/UL (ref 1.1–5.9)
LYMPHOCYTES # BLD: 1.8 K/UL (ref 0.9–3.6)
LYMPHOCYTES # BLD: 1.8 K/UL (ref 1.1–5.9)
LYMPHOCYTES # BLD: 2.1 K/UL (ref 0.8–3.5)
LYMPHOCYTES # BLD: 2.1 K/UL (ref 0.9–3.6)
LYMPHOCYTES # BLD: 2.6 K/UL (ref 0.9–3.6)
LYMPHOCYTES # BLD: 3.5 K/UL (ref 0.8–3.5)
LYMPHOCYTES # BLD: 4.1 K/UL (ref 1.1–5.9)
LYMPHOCYTES # BLD: 8.4 K/UL (ref 0.8–3.5)
LYMPHOCYTES NFR BLD AUTO: 19 %
LYMPHOCYTES NFR BLD: 12 % (ref 20–51)
LYMPHOCYTES NFR BLD: 14 % (ref 14–44)
LYMPHOCYTES NFR BLD: 14 % (ref 21–52)
LYMPHOCYTES NFR BLD: 16 % (ref 21–52)
LYMPHOCYTES NFR BLD: 19 % (ref 21–52)
LYMPHOCYTES NFR BLD: 23 % (ref 14–44)
LYMPHOCYTES NFR BLD: 23 % (ref 20–51)
LYMPHOCYTES NFR BLD: 29 % (ref 20–51)
LYMPHOCYTES NFR BLD: 3 % (ref 21–52)
LYMPHOCYTES NFR BLD: 36 % (ref 21–52)
LYMPHOCYTES NFR BLD: 36 % (ref 21–52)
LYMPHOCYTES NFR BLD: 37 % (ref 21–52)
LYMPHOCYTES NFR BLD: 38 % (ref 14–44)
LYMPHOCYTES NFR BLD: 38 % (ref 21–52)
LYMPHOCYTES NFR BLD: 39 % (ref 14–44)
LYMPHOCYTES NFR BLD: 4 % (ref 21–52)
LYMPHOCYTES NFR BLD: 44 % (ref 21–52)
LYMPHOCYTES NFR BLD: 48 % (ref 14–44)
LYMPHOCYTES NFR BLD: 50 % (ref 14–44)
LYMPHOCYTES NFR BLD: 54 % (ref 14–44)
LYMPHOCYTES NFR BLD: 8 % (ref 20–51)
LYMPHOCYTES NFR BLD: 9 % (ref 21–52)
MCH RBC QN AUTO: 30.6 PG (ref 24–34)
MCH RBC QN AUTO: 31.1 PG (ref 24–34)
MCH RBC QN AUTO: 31.2 PG (ref 25–35)
MCH RBC QN AUTO: 31.3 PG (ref 24–34)
MCH RBC QN AUTO: 31.7 PG (ref 25–35)
MCH RBC QN AUTO: 31.7 PG (ref 25–35)
MCH RBC QN AUTO: 31.8 PG (ref 24–34)
MCH RBC QN AUTO: 32.4 PG (ref 24–34)
MCH RBC QN AUTO: 32.4 PG (ref 24–34)
MCH RBC QN AUTO: 32.9 PG (ref 24–34)
MCH RBC QN AUTO: 33.1 PG (ref 24–34)
MCH RBC QN AUTO: 33.2 PG (ref 25–35)
MCH RBC QN AUTO: 33.3 PG (ref 24–34)
MCH RBC QN AUTO: 33.3 PG (ref 24–34)
MCH RBC QN AUTO: 33.4 PG (ref 25–35)
MCH RBC QN AUTO: 33.6 PG (ref 24–34)
MCH RBC QN AUTO: 34.1 PG (ref 25–35)
MCH RBC QN AUTO: 34.3 PG (ref 24–34)
MCH RBC QN AUTO: 34.3 PG (ref 24–34)
MCH RBC QN AUTO: 34.6 PG (ref 26.6–33)
MCH RBC QN AUTO: 34.7 PG (ref 24–34)
MCH RBC QN AUTO: 34.8 PG (ref 24–34)
MCH RBC QN AUTO: 35.1 PG (ref 25–35)
MCHC RBC AUTO-ENTMCNC: 31.5 G/DL (ref 31–37)
MCHC RBC AUTO-ENTMCNC: 31.6 G/DL (ref 31–37)
MCHC RBC AUTO-ENTMCNC: 31.8 G/DL (ref 31–37)
MCHC RBC AUTO-ENTMCNC: 31.9 G/DL (ref 31–37)
MCHC RBC AUTO-ENTMCNC: 31.9 G/DL (ref 31–37)
MCHC RBC AUTO-ENTMCNC: 32 G/DL (ref 31–37)
MCHC RBC AUTO-ENTMCNC: 32.1 G/DL (ref 31–37)
MCHC RBC AUTO-ENTMCNC: 32.3 G/DL (ref 31–37)
MCHC RBC AUTO-ENTMCNC: 32.3 G/DL (ref 31–37)
MCHC RBC AUTO-ENTMCNC: 32.5 G/DL (ref 31–37)
MCHC RBC AUTO-ENTMCNC: 32.5 G/DL (ref 31–37)
MCHC RBC AUTO-ENTMCNC: 32.6 G/DL (ref 31–37)
MCHC RBC AUTO-ENTMCNC: 32.9 G/DL (ref 31–37)
MCHC RBC AUTO-ENTMCNC: 33.1 G/DL (ref 31–37)
MCHC RBC AUTO-ENTMCNC: 33.2 G/DL (ref 31–37)
MCHC RBC AUTO-ENTMCNC: 33.3 G/DL (ref 31–37)
MCHC RBC AUTO-ENTMCNC: 33.9 G/DL (ref 31–37)
MCHC RBC AUTO-ENTMCNC: 34.1 G/DL (ref 31–37)
MCHC RBC AUTO-ENTMCNC: 34.7 G/DL (ref 31.5–35.7)
MCHC RBC AUTO-ENTMCNC: 34.7 G/DL (ref 31–37)
MCHC RBC AUTO-ENTMCNC: 35.5 G/DL (ref 31–37)
MCHC RBC AUTO-ENTMCNC: 35.8 G/DL (ref 31–37)
MCHC RBC AUTO-ENTMCNC: 36.8 G/DL (ref 31–37)
MCV RBC AUTO: 100 FL (ref 78–102)
MCV RBC AUTO: 100 FL (ref 79–97)
MCV RBC AUTO: 100.4 FL (ref 74–97)
MCV RBC AUTO: 100.4 FL (ref 74–97)
MCV RBC AUTO: 102.4 FL (ref 74–97)
MCV RBC AUTO: 102.4 FL (ref 78–102)
MCV RBC AUTO: 102.5 FL (ref 74–97)
MCV RBC AUTO: 102.8 FL (ref 74–97)
MCV RBC AUTO: 104.1 FL (ref 74–97)
MCV RBC AUTO: 104.1 FL (ref 74–97)
MCV RBC AUTO: 104.7 FL (ref 78–102)
MCV RBC AUTO: 105.3 FL (ref 74–97)
MCV RBC AUTO: 105.5 FL (ref 74–97)
MCV RBC AUTO: 108.1 FL (ref 78–102)
MCV RBC AUTO: 109.1 FL (ref 74–97)
MCV RBC AUTO: 110.3 FL (ref 74–97)
MCV RBC AUTO: 86 FL (ref 78–102)
MCV RBC AUTO: 88 FL (ref 78–102)
MCV RBC AUTO: 88.5 FL (ref 78–102)
MCV RBC AUTO: 90.2 FL (ref 74–97)
MCV RBC AUTO: 90.2 FL (ref 74–97)
MCV RBC AUTO: 93.4 FL (ref 74–97)
MCV RBC AUTO: 96.1 FL (ref 74–97)
METAMYELOCYTES NFR BLD MANUAL: 1 %
METAMYELOCYTES NFR BLD MANUAL: 2 %
METAMYELOCYTES NFR BLD MANUAL: 9 %
MONOCYTES # BLD AUTO: 0.9 X10E3/UL (ref 0.1–0.9)
MONOCYTES # BLD: 0.4 K/UL (ref 0.05–1.2)
MONOCYTES # BLD: 0.5 K/UL (ref 0.05–1.2)
MONOCYTES # BLD: 0.6 K/UL (ref 0.05–1.2)
MONOCYTES # BLD: 0.8 K/UL (ref 0.05–1.2)
MONOCYTES # BLD: 0.8 K/UL (ref 0.05–1.2)
MONOCYTES # BLD: 1 K/UL (ref 0.05–1.2)
MONOCYTES # BLD: 1.1 K/UL (ref 0.05–1.2)
MONOCYTES # BLD: 1.1 K/UL (ref 0.05–1.2)
MONOCYTES # BLD: 1.2 K/UL (ref 0–1)
MONOCYTES # BLD: 1.3 K/UL (ref 0–1)
MONOCYTES # BLD: 1.5 K/UL (ref 0.05–1.2)
MONOCYTES # BLD: 1.8 K/UL (ref 0.05–1.2)
MONOCYTES # BLD: 1.9 K/UL (ref 0.05–1.2)
MONOCYTES # BLD: 2.6 K/UL (ref 0–1)
MONOCYTES # BLD: 3.1 K/UL (ref 0–1)
MONOCYTES NFR BLD AUTO: 11 %
MONOCYTES NFR BLD: 12 % (ref 3–10)
MONOCYTES NFR BLD: 14 % (ref 3–10)
MONOCYTES NFR BLD: 17 % (ref 3–10)
MONOCYTES NFR BLD: 17 % (ref 3–10)
MONOCYTES NFR BLD: 18 % (ref 3–10)
MONOCYTES NFR BLD: 18 % (ref 3–10)
MONOCYTES NFR BLD: 24 % (ref 3–10)
MONOCYTES NFR BLD: 27 % (ref 2–9)
MONOCYTES NFR BLD: 7 % (ref 2–9)
MONOCYTES NFR BLD: 7 % (ref 3–10)
MONOCYTES NFR BLD: 7 % (ref 3–10)
MONOCYTES NFR BLD: 8 % (ref 3–10)
MONOCYTES NFR BLD: 8 % (ref 3–10)
MYELOCYTES NFR BLD MANUAL: 2 %
NEUTROPHILS # BLD AUTO: 5.2 X10E3/UL (ref 1.4–7)
NEUTROPHILS NFR BLD AUTO: 63 %
NEUTS BAND NFR BLD MANUAL: 1 % (ref 0–5)
NEUTS BAND NFR BLD MANUAL: 1 % (ref 0–5)
NEUTS BAND NFR BLD MANUAL: 18 % (ref 0–5)
NEUTS BAND NFR BLD MANUAL: 6 % (ref 0–5)
NEUTS SEG # BLD: 0.5 K/UL (ref 1.8–9.5)
NEUTS SEG # BLD: 0.6 K/UL (ref 1.8–9.5)
NEUTS SEG # BLD: 0.8 K/UL (ref 1.8–8)
NEUTS SEG # BLD: 0.8 K/UL (ref 1.8–9.5)
NEUTS SEG # BLD: 1 K/UL (ref 1.8–9.5)
NEUTS SEG # BLD: 1.3 K/UL (ref 1.8–8)
NEUTS SEG # BLD: 1.3 K/UL (ref 1.8–8)
NEUTS SEG # BLD: 1.4 K/UL (ref 1.8–9.5)
NEUTS SEG # BLD: 1.5 K/UL (ref 1.8–8)
NEUTS SEG # BLD: 1.5 K/UL (ref 1.8–8)
NEUTS SEG # BLD: 1.6 K/UL (ref 1.8–8)
NEUTS SEG # BLD: 13.6 K/UL (ref 1.8–8)
NEUTS SEG # BLD: 18.8 K/UL (ref 1.8–8)
NEUTS SEG # BLD: 18.9 K/UL (ref 1.8–8)
NEUTS SEG # BLD: 2.3 K/UL (ref 1.8–9.5)
NEUTS SEG # BLD: 20.9 K/UL (ref 1.8–8)
NEUTS SEG # BLD: 22.4 K/UL (ref 1.8–9.5)
NEUTS SEG # BLD: 23.1 K/UL (ref 1.8–8)
NEUTS SEG # BLD: 3.6 K/UL (ref 1.8–8)
NEUTS SEG # BLD: 35.4 K/UL (ref 1.8–8)
NEUTS SEG # BLD: 5.1 K/UL (ref 1.8–8)
NEUTS SEG # BLD: 9 K/UL (ref 1.8–8)
NEUTS SEG NFR BLD: 21 % (ref 40–70)
NEUTS SEG NFR BLD: 21 % (ref 40–70)
NEUTS SEG NFR BLD: 30 % (ref 40–73)
NEUTS SEG NFR BLD: 31 % (ref 40–70)
NEUTS SEG NFR BLD: 31 % (ref 40–73)
NEUTS SEG NFR BLD: 31 % (ref 42–75)
NEUTS SEG NFR BLD: 32 % (ref 40–73)
NEUTS SEG NFR BLD: 39 % (ref 40–70)
NEUTS SEG NFR BLD: 39 % (ref 42–75)
NEUTS SEG NFR BLD: 40 % (ref 40–73)
NEUTS SEG NFR BLD: 43 % (ref 40–73)
NEUTS SEG NFR BLD: 51 % (ref 40–70)
NEUTS SEG NFR BLD: 61 % (ref 40–73)
NEUTS SEG NFR BLD: 65 % (ref 40–70)
NEUTS SEG NFR BLD: 67 % (ref 40–73)
NEUTS SEG NFR BLD: 71 % (ref 40–73)
NEUTS SEG NFR BLD: 74 % (ref 42–75)
NEUTS SEG NFR BLD: 75 % (ref 40–70)
NEUTS SEG NFR BLD: 77 % (ref 42–75)
NEUTS SEG NFR BLD: 81 % (ref 40–73)
NEUTS SEG NFR BLD: 87 % (ref 40–73)
NEUTS SEG NFR BLD: 89 % (ref 40–73)
NRBC BLD-RTO: 3 PER 100 WBC
NRBC BLD-RTO: 4 PER 100 WBC
OTHER CELLS NFR BLD MANUAL: 2 %
OTHER CELLS NFR BLD MANUAL: 5 %
PLATELET # BLD AUTO: 125 X10E3/UL (ref 150–450)
PLATELET # BLD AUTO: 134 K/UL (ref 135–420)
PLATELET # BLD AUTO: 175 K/UL (ref 135–420)
PLATELET # BLD AUTO: 187 K/UL (ref 135–420)
PLATELET # BLD AUTO: 235 K/UL (ref 135–420)
PLATELET # BLD AUTO: 30 K/UL (ref 135–420)
PLATELET # BLD AUTO: 311 K/UL (ref 135–420)
PLATELET # BLD AUTO: 357 K/UL (ref 135–420)
PLATELET # BLD AUTO: 364 K/UL (ref 135–420)
PLATELET # BLD AUTO: 366 K/UL (ref 140–440)
PLATELET # BLD AUTO: 383 K/UL (ref 135–420)
PLATELET # BLD AUTO: 476 K/UL (ref 135–420)
PLATELET # BLD AUTO: 523 K/UL (ref 135–420)
PLATELET # BLD AUTO: 530 K/UL (ref 135–420)
PLATELET # BLD AUTO: 537 K/UL (ref 135–420)
PLATELET # BLD AUTO: 542 K/UL (ref 135–420)
PLATELET # BLD AUTO: 591 K/UL (ref 140–440)
PLATELET # BLD AUTO: 63 K/UL (ref 135–420)
PLATELET # BLD AUTO: 687 K/UL (ref 135–420)
PLATELET # BLD AUTO: 802 K/UL (ref 140–440)
PLATELET # BLD AUTO: 813 K/UL (ref 135–420)
PLATELET # BLD AUTO: 813 K/UL (ref 135–420)
PLATELET COMMENTS,PCOM: ABNORMAL
PMV BLD AUTO: 10.1 FL (ref 9.2–11.8)
PMV BLD AUTO: 12.4 FL (ref 9.2–11.8)
PMV BLD AUTO: 8.2 FL (ref 9.2–11.8)
PMV BLD AUTO: 8.3 FL (ref 9.2–11.8)
PMV BLD AUTO: 8.4 FL (ref 9.2–11.8)
PMV BLD AUTO: 8.6 FL (ref 9.2–11.8)
PMV BLD AUTO: 8.7 FL (ref 9.2–11.8)
PMV BLD AUTO: 8.8 FL (ref 9.2–11.8)
PMV BLD AUTO: 9.2 FL (ref 9.2–11.8)
PMV BLD AUTO: 9.5 FL (ref 9.2–11.8)
PMV BLD AUTO: 9.6 FL (ref 9.2–11.8)
PMV BLD AUTO: 9.7 FL (ref 9.2–11.8)
PMV BLD AUTO: 9.9 FL (ref 9.2–11.8)
POTASSIUM SERPL-SCNC: 3.7 MMOL/L (ref 3.5–5.5)
POTASSIUM SERPL-SCNC: 3.8 MMOL/L (ref 3.5–5.5)
POTASSIUM SERPL-SCNC: 4.1 MMOL/L (ref 3.5–5.5)
POTASSIUM SERPL-SCNC: 4.1 MMOL/L (ref 3.5–5.5)
POTASSIUM SERPL-SCNC: 4.3 MMOL/L (ref 3.5–5.5)
POTASSIUM SERPL-SCNC: 4.4 MMOL/L (ref 3.5–5.5)
POTASSIUM SERPL-SCNC: 4.6 MMOL/L (ref 3.5–5.5)
POTASSIUM SERPL-SCNC: 4.7 MMOL/L (ref 3.5–5.5)
POTASSIUM SERPL-SCNC: 4.7 MMOL/L (ref 3.5–5.5)
POTASSIUM SERPL-SCNC: 5 MMOL/L (ref 3.5–5.5)
POTASSIUM SERPL-SCNC: 5.3 MMOL/L (ref 3.5–5.2)
PROT SERPL-MCNC: 7.6 G/DL (ref 6–8.5)
PROT SERPL-MCNC: 7.7 G/DL (ref 6.4–8.2)
PROT SERPL-MCNC: 7.9 G/DL (ref 6.4–8.2)
PROT SERPL-MCNC: 8 G/DL (ref 6.4–8.2)
PROT SERPL-MCNC: 8.1 G/DL (ref 6.4–8.2)
PROT SERPL-MCNC: 8.1 G/DL (ref 6.4–8.2)
PROT SERPL-MCNC: 8.3 G/DL (ref 6.4–8.2)
PROT SERPL-MCNC: 8.3 G/DL (ref 6.4–8.2)
PROT SERPL-MCNC: 8.8 G/DL (ref 6.4–8.2)
RBC # BLD AUTO: 2.22 M/UL (ref 4.1–5.1)
RBC # BLD AUTO: 2.26 M/UL (ref 4.1–5.1)
RBC # BLD AUTO: 2.34 M/UL (ref 4.2–5.3)
RBC # BLD AUTO: 2.42 M/UL (ref 4.2–5.3)
RBC # BLD AUTO: 2.53 M/UL (ref 4.2–5.3)
RBC # BLD AUTO: 2.56 M/UL (ref 4.2–5.3)
RBC # BLD AUTO: 2.65 M/UL (ref 4.2–5.3)
RBC # BLD AUTO: 2.8 M/UL (ref 4.2–5.3)
RBC # BLD AUTO: 2.86 M/UL (ref 4.2–5.3)
RBC # BLD AUTO: 2.9 M/UL (ref 4.2–5.3)
RBC # BLD AUTO: 2.95 X10E6/UL (ref 3.77–5.28)
RBC # BLD AUTO: 3.07 M/UL (ref 4.2–5.3)
RBC # BLD AUTO: 3.11 M/UL (ref 4.2–5.3)
RBC # BLD AUTO: 3.15 M/UL (ref 4.2–5.3)
RBC # BLD AUTO: 3.17 M/UL (ref 4.1–5.1)
RBC # BLD AUTO: 3.17 M/UL (ref 4.1–5.1)
RBC # BLD AUTO: 3.3 M/UL (ref 4.2–5.3)
RBC # BLD AUTO: 3.37 M/UL (ref 4.1–5.1)
RBC # BLD AUTO: 3.47 M/UL (ref 4.1–5.1)
RBC # BLD AUTO: 3.57 M/UL (ref 4.1–5.1)
RBC # BLD AUTO: 3.62 M/UL (ref 4.2–5.3)
RBC # BLD AUTO: 3.79 M/UL (ref 4.2–5.3)
RBC # BLD AUTO: 4.28 M/UL (ref 4.2–5.3)
RBC MORPH BLD: ABNORMAL
RETICS/RBC NFR AUTO: 1.3 % (ref 0.6–2.6)
SODIUM SERPL-SCNC: 132 MMOL/L (ref 134–144)
SODIUM SERPL-SCNC: 133 MMOL/L (ref 136–145)
SODIUM SERPL-SCNC: 133 MMOL/L (ref 136–145)
SODIUM SERPL-SCNC: 136 MMOL/L (ref 136–145)
SODIUM SERPL-SCNC: 137 MMOL/L (ref 136–145)
SODIUM SERPL-SCNC: 138 MMOL/L (ref 136–145)
SODIUM SERPL-SCNC: 139 MMOL/L (ref 136–145)
SPECIMEN EXP DATE BLD: NORMAL
STATUS OF UNIT,%ST: NORMAL
TIBC SERPL-MCNC: <239 UG/DL (ref 250–450)
UIBC SERPL-MCNC: <17 UG/DL (ref 118–369)
UNIT DIVISION, %UDIV: 0
VIT B12 SERPL-MCNC: >2000 PG/ML (ref 232–1245)
WBC # BLD AUTO: 1.5 K/UL (ref 4.5–13)
WBC # BLD AUTO: 13.6 K/UL (ref 4.6–13.2)
WBC # BLD AUTO: 17.7 K/UL (ref 4.6–13.2)
WBC # BLD AUTO: 2.2 K/UL (ref 4.5–13)
WBC # BLD AUTO: 2.3 K/UL (ref 4.6–13.2)
WBC # BLD AUTO: 2.4 K/UL (ref 4.5–13)
WBC # BLD AUTO: 2.8 K/UL (ref 4.5–13)
WBC # BLD AUTO: 21.1 K/UL (ref 4.6–13.2)
WBC # BLD AUTO: 23.4 K/UL (ref 4.6–13.2)
WBC # BLD AUTO: 26.7 K/UL (ref 4.6–13.2)
WBC # BLD AUTO: 29.8 K/UL (ref 4.5–13)
WBC # BLD AUTO: 3.3 K/UL (ref 4.6–13.2)
WBC # BLD AUTO: 3.7 K/UL (ref 4.6–13.2)
WBC # BLD AUTO: 36.7 K/UL (ref 4.6–13.2)
WBC # BLD AUTO: 4.3 K/UL (ref 4.6–13.2)
WBC # BLD AUTO: 4.4 K/UL (ref 4.5–13)
WBC # BLD AUTO: 4.7 K/UL (ref 4.5–13)
WBC # BLD AUTO: 4.7 K/UL (ref 4.6–13.2)
WBC # BLD AUTO: 4.9 K/UL (ref 4.6–13.2)
WBC # BLD AUTO: 44.2 K/UL (ref 4.6–13.2)
WBC # BLD AUTO: 6 K/UL (ref 4.6–13.2)
WBC # BLD AUTO: 7.2 K/UL (ref 4.6–13.2)
WBC # BLD AUTO: 8.2 X10E3/UL (ref 3.4–10.8)

## 2021-01-01 PROCEDURE — 96415 CHEMO IV INFUSION ADDL HR: CPT

## 2021-01-01 PROCEDURE — 77030012965 HC NDL HUBR BBMI -A

## 2021-01-01 PROCEDURE — 36415 COLL VENOUS BLD VENIPUNCTURE: CPT

## 2021-01-01 PROCEDURE — 74011250637 HC RX REV CODE- 250/637: Performed by: INTERNAL MEDICINE

## 2021-01-01 PROCEDURE — 85025 COMPLETE CBC W/AUTO DIFF WBC: CPT

## 2021-01-01 PROCEDURE — 74011250636 HC RX REV CODE- 250/636: Performed by: INTERNAL MEDICINE

## 2021-01-01 PROCEDURE — 96375 TX/PRO/DX INJ NEW DRUG ADDON: CPT

## 2021-01-01 PROCEDURE — 99214 OFFICE O/P EST MOD 30 MIN: CPT | Performed by: INTERNAL MEDICINE

## 2021-01-01 PROCEDURE — 36591 DRAW BLOOD OFF VENOUS DEVICE: CPT

## 2021-01-01 PROCEDURE — 86920 COMPATIBILITY TEST SPIN: CPT

## 2021-01-01 PROCEDURE — 86900 BLOOD TYPING SEROLOGIC ABO: CPT

## 2021-01-01 PROCEDURE — 96413 CHEMO IV INFUSION 1 HR: CPT

## 2021-01-01 PROCEDURE — 99211 OFF/OP EST MAY X REQ PHY/QHP: CPT

## 2021-01-01 PROCEDURE — 80053 COMPREHEN METABOLIC PANEL: CPT

## 2021-01-01 PROCEDURE — 36430 TRANSFUSION BLD/BLD COMPNT: CPT

## 2021-01-01 PROCEDURE — 93970 EXTREMITY STUDY: CPT

## 2021-01-01 PROCEDURE — 96377 APPLICATON ON-BODY INJECTOR: CPT

## 2021-01-01 PROCEDURE — 96372 THER/PROPH/DIAG INJ SC/IM: CPT

## 2021-01-01 PROCEDURE — 85049 AUTOMATED PLATELET COUNT: CPT

## 2021-01-01 PROCEDURE — 74011250637 HC RX REV CODE- 250/637: Performed by: NURSE PRACTITIONER

## 2021-01-01 PROCEDURE — 99215 OFFICE O/P EST HI 40 MIN: CPT | Performed by: INTERNAL MEDICINE

## 2021-01-01 PROCEDURE — 77030013169 SET IV BLD ICUM -A

## 2021-01-01 PROCEDURE — 96374 THER/PROPH/DIAG INJ IV PUSH: CPT

## 2021-01-01 PROCEDURE — P9016 RBC LEUKOCYTES REDUCED: HCPCS

## 2021-01-01 PROCEDURE — 74011250636 HC RX REV CODE- 250/636

## 2021-01-01 RX ORDER — ONDANSETRON 2 MG/ML
8 INJECTION INTRAMUSCULAR; INTRAVENOUS ONCE
Status: CANCELLED | OUTPATIENT
Start: 2021-01-01 | End: 2021-01-01

## 2021-01-01 RX ORDER — HEPARIN 100 UNIT/ML
300-500 SYRINGE INTRAVENOUS AS NEEDED
Status: DISPENSED | OUTPATIENT
Start: 2021-01-01 | End: 2021-01-01

## 2021-01-01 RX ORDER — DIPHENHYDRAMINE HYDROCHLORIDE 50 MG/ML
25 INJECTION, SOLUTION INTRAMUSCULAR; INTRAVENOUS AS NEEDED
Status: CANCELLED
Start: 2021-01-01

## 2021-01-01 RX ORDER — SODIUM CHLORIDE 0.9 % (FLUSH) 0.9 %
10-40 SYRINGE (ML) INJECTION AS NEEDED
Status: DISPENSED | OUTPATIENT
Start: 2021-01-01 | End: 2021-01-01

## 2021-01-01 RX ORDER — DIPHENHYDRAMINE HYDROCHLORIDE 50 MG/ML
50 INJECTION, SOLUTION INTRAMUSCULAR; INTRAVENOUS AS NEEDED
Status: CANCELLED
Start: 2021-01-01

## 2021-01-01 RX ORDER — HEPARIN 100 UNIT/ML
300-500 SYRINGE INTRAVENOUS AS NEEDED
Status: CANCELLED
Start: 2021-01-01

## 2021-01-01 RX ORDER — ALBUTEROL SULFATE 0.83 MG/ML
2.5 SOLUTION RESPIRATORY (INHALATION) AS NEEDED
Status: CANCELLED
Start: 2021-01-01

## 2021-01-01 RX ORDER — ACETAMINOPHEN 325 MG/1
650 TABLET ORAL AS NEEDED
Status: CANCELLED
Start: 2021-01-01

## 2021-01-01 RX ORDER — HYDROCORTISONE SODIUM SUCCINATE 100 MG/2ML
100 INJECTION, POWDER, FOR SOLUTION INTRAMUSCULAR; INTRAVENOUS AS NEEDED
Status: CANCELLED | OUTPATIENT
Start: 2021-01-01

## 2021-01-01 RX ORDER — EPINEPHRINE 1 MG/ML
0.3 INJECTION, SOLUTION, CONCENTRATE INTRAVENOUS AS NEEDED
Status: CANCELLED | OUTPATIENT
Start: 2021-01-01

## 2021-01-01 RX ORDER — ONDANSETRON 2 MG/ML
8 INJECTION INTRAMUSCULAR; INTRAVENOUS ONCE
Status: COMPLETED | OUTPATIENT
Start: 2021-01-01 | End: 2021-01-01

## 2021-01-01 RX ORDER — SODIUM CHLORIDE 0.9 % (FLUSH) 0.9 %
10-40 SYRINGE (ML) INJECTION AS NEEDED
Status: CANCELLED | OUTPATIENT
Start: 2021-01-01

## 2021-01-01 RX ORDER — POTASSIUM CHLORIDE 750 MG/1
10 TABLET, EXTENDED RELEASE ORAL DAILY
Qty: 15 TABLET | Refills: 0 | Status: SHIPPED | OUTPATIENT
Start: 2021-01-01 | End: 2021-01-01

## 2021-01-01 RX ORDER — MEGESTROL ACETATE 40 MG/1
TABLET ORAL
Qty: 30 TABLET | Refills: 1 | Status: SHIPPED | OUTPATIENT
Start: 2021-01-01 | End: 2021-01-01

## 2021-01-01 RX ORDER — ONDANSETRON 2 MG/ML
8 INJECTION INTRAMUSCULAR; INTRAVENOUS AS NEEDED
Status: CANCELLED | OUTPATIENT
Start: 2021-01-01

## 2021-01-01 RX ORDER — ONDANSETRON 4 MG/1
8 TABLET, ORALLY DISINTEGRATING ORAL ONCE
Status: CANCELLED
Start: 2021-01-01 | End: 2021-01-01

## 2021-01-01 RX ORDER — SODIUM CHLORIDE 9 MG/ML
25 INJECTION, SOLUTION INTRAVENOUS CONTINUOUS
Status: DISCONTINUED | OUTPATIENT
Start: 2021-01-01 | End: 2021-01-01 | Stop reason: HOSPADM

## 2021-01-01 RX ORDER — DIPHENHYDRAMINE HYDROCHLORIDE 50 MG/ML
50 INJECTION, SOLUTION INTRAMUSCULAR; INTRAVENOUS AS NEEDED
Status: DISCONTINUED | OUTPATIENT
Start: 2021-01-01 | End: 2021-01-01 | Stop reason: HOSPADM

## 2021-01-01 RX ORDER — ONDANSETRON 8 MG/1
TABLET, ORALLY DISINTEGRATING ORAL
COMMUNITY
Start: 2021-01-01

## 2021-01-01 RX ORDER — DIPHENHYDRAMINE HCL 25 MG
25 CAPSULE ORAL ONCE
Status: COMPLETED | OUTPATIENT
Start: 2021-01-01 | End: 2021-01-01

## 2021-01-01 RX ORDER — SODIUM CHLORIDE 9 MG/ML
25 INJECTION, SOLUTION INTRAVENOUS CONTINUOUS
Status: DISPENSED | OUTPATIENT
Start: 2021-01-01 | End: 2021-01-01

## 2021-01-01 RX ORDER — HEPARIN 100 UNIT/ML
300-500 SYRINGE INTRAVENOUS AS NEEDED
Status: DISCONTINUED | OUTPATIENT
Start: 2021-01-01 | End: 2021-01-01 | Stop reason: HOSPADM

## 2021-01-01 RX ORDER — HEPARIN 100 UNIT/ML
500 SYRINGE INTRAVENOUS ONCE
Status: COMPLETED | OUTPATIENT
Start: 2021-01-01 | End: 2021-01-01

## 2021-01-01 RX ORDER — SODIUM CHLORIDE 9 MG/ML
10 INJECTION INTRAMUSCULAR; INTRAVENOUS; SUBCUTANEOUS AS NEEDED
Status: CANCELLED | OUTPATIENT
Start: 2021-01-01

## 2021-01-01 RX ORDER — SODIUM CHLORIDE 9 MG/ML
25 INJECTION, SOLUTION INTRAVENOUS CONTINUOUS
Status: CANCELLED | OUTPATIENT
Start: 2021-01-01

## 2021-01-01 RX ORDER — FUROSEMIDE 20 MG/1
20 TABLET ORAL DAILY
Qty: 15 TABLET | Refills: 0 | Status: SHIPPED | OUTPATIENT
Start: 2021-01-01 | End: 2021-01-01

## 2021-01-01 RX ORDER — HYDROCORTISONE SODIUM SUCCINATE 100 MG/2ML
100 INJECTION, POWDER, FOR SOLUTION INTRAMUSCULAR; INTRAVENOUS AS NEEDED
Status: DISCONTINUED | OUTPATIENT
Start: 2021-01-01 | End: 2021-01-01 | Stop reason: HOSPADM

## 2021-01-01 RX ORDER — SODIUM CHLORIDE 0.9 % (FLUSH) 0.9 %
10-40 SYRINGE (ML) INJECTION AS NEEDED
Status: DISCONTINUED | OUTPATIENT
Start: 2021-01-01 | End: 2021-01-01 | Stop reason: HOSPADM

## 2021-01-01 RX ORDER — FUROSEMIDE 20 MG/1
TABLET ORAL
Qty: 15 TABLET | Refills: 0 | Status: SHIPPED | OUTPATIENT
Start: 2021-01-01 | End: 2021-01-01

## 2021-01-01 RX ORDER — SENNOSIDES 8.6 MG/1
TABLET ORAL
COMMUNITY
Start: 2021-01-01

## 2021-01-01 RX ORDER — HEPARIN 100 UNIT/ML
SYRINGE INTRAVENOUS
Status: DISPENSED
Start: 2021-01-01 | End: 2021-01-01

## 2021-01-01 RX ORDER — ONDANSETRON 2 MG/ML
8 INJECTION INTRAMUSCULAR; INTRAVENOUS ONCE
Status: DISCONTINUED | OUTPATIENT
Start: 2021-01-01 | End: 2021-01-01

## 2021-01-01 RX ORDER — HEPARIN 100 UNIT/ML
300-500 SYRINGE INTRAVENOUS AS NEEDED
Status: ACTIVE | OUTPATIENT
Start: 2021-01-01 | End: 2021-01-01

## 2021-01-01 RX ORDER — SODIUM CHLORIDE 9 MG/ML
250 INJECTION, SOLUTION INTRAVENOUS AS NEEDED
Status: DISCONTINUED | OUTPATIENT
Start: 2021-01-01 | End: 2021-01-01 | Stop reason: HOSPADM

## 2021-01-01 RX ORDER — ONDANSETRON 8 MG/1
8 TABLET, ORALLY DISINTEGRATING ORAL ONCE
Status: COMPLETED | OUTPATIENT
Start: 2021-01-01 | End: 2021-01-01

## 2021-01-01 RX ORDER — ACETAMINOPHEN 325 MG/1
650 TABLET ORAL ONCE
Status: COMPLETED | OUTPATIENT
Start: 2021-01-01 | End: 2021-01-01

## 2021-01-01 RX ORDER — SODIUM CHLORIDE 0.9 % (FLUSH) 0.9 %
10-40 SYRINGE (ML) INJECTION AS NEEDED
Status: DISCONTINUED | OUTPATIENT
Start: 2021-01-01 | End: 2021-01-01

## 2021-01-01 RX ORDER — MEGESTROL ACETATE 40 MG/1
TABLET ORAL
Qty: 30 TABLET | Refills: 1 | Status: SHIPPED | OUTPATIENT
Start: 2021-01-01

## 2021-01-01 RX ORDER — ACETAMINOPHEN 325 MG/1
975 TABLET ORAL ONCE
Status: COMPLETED | OUTPATIENT
Start: 2021-01-01 | End: 2021-01-01

## 2021-01-01 RX ORDER — DIPHENHYDRAMINE HYDROCHLORIDE 50 MG/ML
25 INJECTION, SOLUTION INTRAMUSCULAR; INTRAVENOUS AS NEEDED
Status: DISCONTINUED | OUTPATIENT
Start: 2021-01-01 | End: 2021-01-01 | Stop reason: HOSPADM

## 2021-01-01 RX ORDER — ACETAMINOPHEN 325 MG/1
650 TABLET ORAL AS NEEDED
Status: DISCONTINUED | OUTPATIENT
Start: 2021-01-01 | End: 2021-01-01 | Stop reason: HOSPADM

## 2021-01-01 RX ORDER — POTASSIUM CHLORIDE 750 MG/1
TABLET, EXTENDED RELEASE ORAL
Qty: 15 TABLET | Refills: 0 | Status: SHIPPED | OUTPATIENT
Start: 2021-01-01 | End: 2021-01-01

## 2021-01-01 RX ORDER — LIDOCAINE AND PRILOCAINE 25; 25 MG/G; MG/G
CREAM TOPICAL AS NEEDED
Qty: 30 G | Refills: 0 | Status: SHIPPED | OUTPATIENT
Start: 2021-01-01

## 2021-01-01 RX ORDER — HEPARIN 100 UNIT/ML
500 SYRINGE INTRAVENOUS AS NEEDED
Status: DISCONTINUED | OUTPATIENT
Start: 2021-01-01 | End: 2021-01-01 | Stop reason: HOSPADM

## 2021-01-01 RX ORDER — MEGESTROL ACETATE 40 MG/1
40 TABLET ORAL DAILY
Qty: 30 TABLET | Refills: 1 | Status: SHIPPED | OUTPATIENT
Start: 2021-01-01 | End: 2021-01-01

## 2021-01-01 RX ORDER — CYCLOBENZAPRINE HCL 5 MG
TABLET ORAL
COMMUNITY
Start: 2021-01-01

## 2021-01-01 RX ORDER — POTASSIUM CHLORIDE 750 MG/1
TABLET, EXTENDED RELEASE ORAL
Qty: 15 TABLET | Refills: 0 | Status: SHIPPED | OUTPATIENT
Start: 2021-01-01

## 2021-01-01 RX ORDER — ALBUTEROL SULFATE 0.83 MG/ML
2.5 SOLUTION RESPIRATORY (INHALATION) AS NEEDED
Status: DISCONTINUED | OUTPATIENT
Start: 2021-01-01 | End: 2021-01-01 | Stop reason: HOSPADM

## 2021-01-01 RX ORDER — SODIUM CHLORIDE 9 MG/ML
25 INJECTION, SOLUTION INTRAVENOUS CONTINUOUS
Status: DISCONTINUED | OUTPATIENT
Start: 2021-01-01 | End: 2021-01-01

## 2021-01-01 RX ORDER — SODIUM CHLORIDE 0.9 % (FLUSH) 0.9 %
10 SYRINGE (ML) INJECTION AS NEEDED
Status: CANCELLED | OUTPATIENT
Start: 2021-01-01

## 2021-01-01 RX ORDER — HEPARIN 100 UNIT/ML
300-500 SYRINGE INTRAVENOUS AS NEEDED
Status: DISCONTINUED | OUTPATIENT
Start: 2021-01-01 | End: 2021-01-01

## 2021-01-01 RX ORDER — PREDNISONE 10 MG/1
40 TABLET ORAL
Qty: 28 TABLET | Refills: 0 | Status: SHIPPED | OUTPATIENT
Start: 2021-01-01 | End: 2021-01-01

## 2021-01-01 RX ORDER — ONDANSETRON 8 MG/1
8 TABLET, ORALLY DISINTEGRATING ORAL ONCE
Status: CANCELLED
Start: 2021-01-01 | End: 2021-01-01

## 2021-01-01 RX ORDER — ERYTHROMYCIN AND BENZOYL PEROXIDE 30; 50 MG/G; MG/G
GEL TOPICAL 2 TIMES DAILY
Qty: 46.6 G | Refills: 0 | Status: SHIPPED | OUTPATIENT
Start: 2021-01-01

## 2021-01-01 RX ORDER — FUROSEMIDE 20 MG/1
TABLET ORAL
Qty: 15 TABLET | Refills: 0 | Status: SHIPPED | OUTPATIENT
Start: 2021-01-01

## 2021-01-01 RX ORDER — HEPARIN 100 UNIT/ML
SYRINGE INTRAVENOUS
Status: COMPLETED
Start: 2021-01-01 | End: 2021-01-01

## 2021-01-01 RX ORDER — PALONOSETRON 0.05 MG/ML
0.25 INJECTION, SOLUTION INTRAVENOUS ONCE
Status: CANCELLED | OUTPATIENT
Start: 2021-01-01 | End: 2021-01-01

## 2021-01-01 RX ORDER — EPINEPHRINE 1 MG/ML
0.3 INJECTION, SOLUTION, CONCENTRATE INTRAVENOUS AS NEEDED
Status: DISCONTINUED | OUTPATIENT
Start: 2021-01-01 | End: 2021-01-01 | Stop reason: HOSPADM

## 2021-01-01 RX ORDER — ONDANSETRON 2 MG/ML
8 INJECTION INTRAMUSCULAR; INTRAVENOUS AS NEEDED
Status: DISCONTINUED | OUTPATIENT
Start: 2021-01-01 | End: 2021-01-01 | Stop reason: HOSPADM

## 2021-01-01 RX ADMIN — GEMCITABINE 2400 MG: 100 INJECTION, SOLUTION INTRAVENOUS at 11:02

## 2021-01-01 RX ADMIN — Medication 10 ML: at 12:15

## 2021-01-01 RX ADMIN — ONDANSETRON 8 MG: 2 INJECTION INTRAMUSCULAR; INTRAVENOUS at 11:17

## 2021-01-01 RX ADMIN — Medication 30 ML: at 14:55

## 2021-01-01 RX ADMIN — SODIUM CHLORIDE 25 ML/HR: 9 INJECTION, SOLUTION INTRAVENOUS at 12:10

## 2021-01-01 RX ADMIN — ONDANSETRON 8 MG: 2 INJECTION INTRAMUSCULAR; INTRAVENOUS at 11:05

## 2021-01-01 RX ADMIN — Medication 30 ML: at 11:45

## 2021-01-01 RX ADMIN — ONDANSETRON 8 MG: 2 INJECTION INTRAMUSCULAR; INTRAVENOUS at 14:21

## 2021-01-01 RX ADMIN — HEPARIN 500 UNITS: 100 SYRINGE at 16:35

## 2021-01-01 RX ADMIN — Medication 30 ML: at 11:20

## 2021-01-01 RX ADMIN — SODIUM CHLORIDE 25 ML/HR: 9 INJECTION, SOLUTION INTRAVENOUS at 12:15

## 2021-01-01 RX ADMIN — ONDANSETRON 8 MG: 2 INJECTION, SOLUTION INTRAMUSCULAR; INTRAVENOUS at 11:08

## 2021-01-01 RX ADMIN — ONDANSETRON 8 MG: 8 TABLET, ORALLY DISINTEGRATING ORAL at 11:45

## 2021-01-01 RX ADMIN — SODIUM CHLORIDE 25 ML/HR: 9 INJECTION, SOLUTION INTRAVENOUS at 11:05

## 2021-01-01 RX ADMIN — HEPARIN 500 UNITS: 100 SYRINGE at 14:02

## 2021-01-01 RX ADMIN — GEMCITABINE 2400 MG: 100 INJECTION, SOLUTION INTRAVENOUS at 12:45

## 2021-01-01 RX ADMIN — PEGFILGRASTIM 6 MG: KIT SUBCUTANEOUS at 15:00

## 2021-01-01 RX ADMIN — Medication 20 ML: at 10:38

## 2021-01-01 RX ADMIN — Medication 20 ML: at 13:23

## 2021-01-01 RX ADMIN — HEPARIN 500 UNITS: 100 SYRINGE at 14:52

## 2021-01-01 RX ADMIN — PEGFILGRASTIM 6 MG: 6 INJECTION SUBCUTANEOUS at 11:25

## 2021-01-01 RX ADMIN — Medication 10 ML: at 11:32

## 2021-01-01 RX ADMIN — SODIUM CHLORIDE 25 ML/HR: 9 INJECTION, SOLUTION INTRAVENOUS at 11:35

## 2021-01-01 RX ADMIN — Medication 30 ML: at 13:33

## 2021-01-01 RX ADMIN — PEGFILGRASTIM 6 MG: KIT SUBCUTANEOUS at 15:15

## 2021-01-01 RX ADMIN — SODIUM CHLORIDE 25 ML/HR: 9 INJECTION, SOLUTION INTRAVENOUS at 14:14

## 2021-01-01 RX ADMIN — SODIUM CHLORIDE 25 ML/HR: 9 INJECTION, SOLUTION INTRAVENOUS at 12:05

## 2021-01-01 RX ADMIN — SODIUM CHLORIDE 25 ML/HR: 9 INJECTION, SOLUTION INTRAVENOUS at 11:15

## 2021-01-01 RX ADMIN — GEMCITABINE 2400 MG: 100 INJECTION, SOLUTION INTRAVENOUS at 12:34

## 2021-01-01 RX ADMIN — HEPARIN 500 UNITS: 100 SYRINGE at 13:34

## 2021-01-01 RX ADMIN — ACETAMINOPHEN 650 MG: 325 TABLET ORAL at 10:38

## 2021-01-01 RX ADMIN — PEGFILGRASTIM 6 MG: KIT SUBCUTANEOUS at 12:45

## 2021-01-01 RX ADMIN — Medication 30 ML: at 15:15

## 2021-01-01 RX ADMIN — Medication 30 ML: at 14:40

## 2021-01-01 RX ADMIN — ONDANSETRON 8 MG: 2 INJECTION INTRAMUSCULAR; INTRAVENOUS at 12:22

## 2021-01-01 RX ADMIN — SODIUM CHLORIDE 25 ML/HR: 9 INJECTION, SOLUTION INTRAVENOUS at 09:50

## 2021-01-01 RX ADMIN — Medication 30 ML: at 11:00

## 2021-01-01 RX ADMIN — SODIUM CHLORIDE 25 ML/HR: 900 INJECTION, SOLUTION INTRAVENOUS at 12:25

## 2021-01-01 RX ADMIN — Medication 30 ML: at 09:20

## 2021-01-01 RX ADMIN — HEPARIN 500 UNITS: 100 SYRINGE at 14:16

## 2021-01-01 RX ADMIN — GEMCITABINE 1800 MG: 100 INJECTION, SOLUTION INTRAVENOUS at 12:52

## 2021-01-01 RX ADMIN — HEPARIN 500 UNITS: 100 SYRINGE at 13:33

## 2021-01-01 RX ADMIN — DIPHENHYDRAMINE HYDROCHLORIDE 25 MG: 25 CAPSULE ORAL at 10:38

## 2021-01-01 RX ADMIN — PEGFILGRASTIM 6 MG: KIT SUBCUTANEOUS at 16:13

## 2021-01-01 RX ADMIN — GEMCITABINE 1300 MG: 100 INJECTION, SOLUTION INTRAVENOUS at 12:52

## 2021-01-01 RX ADMIN — SODIUM CHLORIDE 250 ML: 900 INJECTION, SOLUTION INTRAVENOUS at 10:40

## 2021-01-01 RX ADMIN — Medication 30 ML: at 11:30

## 2021-01-01 RX ADMIN — GEMCITABINE 2400 MG: 100 INJECTION, SOLUTION INTRAVENOUS at 11:47

## 2021-01-01 RX ADMIN — HEPARIN 500 UNITS: 100 SYRINGE at 12:16

## 2021-01-01 RX ADMIN — ACETAMINOPHEN 975 MG: 325 TABLET ORAL at 11:11

## 2021-01-01 RX ADMIN — Medication 20 ML: at 14:15

## 2021-01-01 RX ADMIN — ONDANSETRON 8 MG: 2 INJECTION, SOLUTION INTRAMUSCULAR; INTRAVENOUS at 10:00

## 2021-01-01 RX ADMIN — HEPARIN 500 UNITS: 100 SYRINGE at 14:35

## 2021-01-01 RX ADMIN — HEPARIN 500 UNITS: 100 SYRINGE at 13:24

## 2021-01-01 RX ADMIN — ONDANSETRON 8 MG: 8 TABLET, ORALLY DISINTEGRATING ORAL at 11:34

## 2021-01-01 RX ADMIN — SODIUM CHLORIDE 25 ML/HR: 9 INJECTION, SOLUTION INTRAVENOUS at 13:25

## 2021-01-01 RX ADMIN — Medication 30 ML: at 11:34

## 2021-01-01 RX ADMIN — ONDANSETRON 8 MG: 8 TABLET, ORALLY DISINTEGRATING ORAL at 12:19

## 2021-01-01 RX ADMIN — GEMCITABINE 2400 MG: 100 INJECTION, SOLUTION INTRAVENOUS at 11:40

## 2021-01-01 RX ADMIN — HEPARIN 500 UNITS: 100 SYRINGE at 12:47

## 2021-01-01 RX ADMIN — ONDANSETRON 8 MG: 2 INJECTION INTRAMUSCULAR; INTRAVENOUS at 12:02

## 2021-01-01 RX ADMIN — HEPARIN 500 UNITS: 100 SYRINGE at 11:20

## 2021-01-01 RX ADMIN — Medication 10 ML: at 14:51

## 2021-01-01 RX ADMIN — GEMCITABINE 1800 MG: 100 INJECTION, SOLUTION INTRAVENOUS at 12:40

## 2021-01-01 RX ADMIN — GEMCITABINE 2400 MG: 100 INJECTION, SOLUTION INTRAVENOUS at 14:51

## 2021-01-01 RX ADMIN — SODIUM CHLORIDE 25 ML/HR: 9 INJECTION, SOLUTION INTRAVENOUS at 11:07

## 2021-01-01 RX ADMIN — PEGFILGRASTIM 6 MG: 6 INJECTION SUBCUTANEOUS at 12:57

## 2021-01-01 RX ADMIN — Medication 10 ML: at 11:05

## 2021-01-01 RX ADMIN — Medication 20 ML: at 14:27

## 2021-01-01 RX ADMIN — Medication 10 ML: at 14:00

## 2021-01-01 RX ADMIN — GEMCITABINE 1800 MG: 100 INJECTION, SOLUTION INTRAVENOUS at 13:30

## 2021-01-01 RX ADMIN — Medication 30 ML: at 11:35

## 2021-01-01 RX ADMIN — Medication 20 ML: at 12:00

## 2021-01-01 RX ADMIN — HEPARIN 500 UNITS: 100 SYRINGE at 15:15

## 2021-01-01 RX ADMIN — ONDANSETRON 8 MG: 2 INJECTION INTRAMUSCULAR; INTRAVENOUS at 11:42

## 2021-01-01 RX ADMIN — ONDANSETRON 8 MG: 8 TABLET, ORALLY DISINTEGRATING ORAL at 12:10

## 2021-01-01 RX ADMIN — GEMCITABINE 2400 MG: 100 INJECTION, SOLUTION INTRAVENOUS at 11:45

## 2021-01-01 RX ADMIN — Medication 30 ML: at 14:35

## 2021-01-01 RX ADMIN — Medication 10 ML: at 11:36

## 2021-01-01 RX ADMIN — HEPARIN 500 UNITS: 100 SYRINGE at 13:18

## 2021-01-01 RX ADMIN — Medication 10 ML: at 11:40

## 2021-01-01 RX ADMIN — ACETAMINOPHEN 650 MG: 325 TABLET ORAL at 12:50

## 2021-01-01 RX ADMIN — SODIUM CHLORIDE 25 ML/HR: 900 INJECTION, SOLUTION INTRAVENOUS at 12:21

## 2021-01-01 RX ADMIN — Medication 30 ML: at 10:45

## 2021-01-01 RX ADMIN — SODIUM CHLORIDE 25 ML/HR: 9 INJECTION, SOLUTION INTRAVENOUS at 11:40

## 2021-01-01 RX ADMIN — GEMCITABINE 2400 MG: 100 INJECTION, SOLUTION INTRAVENOUS at 13:00

## 2021-01-01 RX ADMIN — Medication 30 ML: at 11:40

## 2021-01-01 RX ADMIN — Medication 30 ML: at 13:44

## 2021-01-01 RX ADMIN — GEMCITABINE 1800 MG: 100 INJECTION, SOLUTION INTRAVENOUS at 11:42

## 2021-01-01 RX ADMIN — Medication 10 ML: at 12:46

## 2021-01-01 RX ADMIN — PEGFILGRASTIM 6 MG: KIT SUBCUTANEOUS at 15:20

## 2021-01-01 RX ADMIN — ONDANSETRON 8 MG: 8 TABLET, ORALLY DISINTEGRATING ORAL at 12:55

## 2021-01-01 RX ADMIN — Medication 10 ML: at 12:21

## 2021-01-01 RX ADMIN — HEPARIN 500 UNITS: 100 SYRINGE at 14:55

## 2021-01-01 RX ADMIN — Medication 30 ML: at 16:35

## 2021-01-01 RX ADMIN — HEPARIN 500 UNITS: 100 SYRINGE at 13:00

## 2021-01-01 RX ADMIN — Medication 20 ML: at 14:10

## 2021-01-01 RX ADMIN — GEMCITABINE 2400 MG: 100 INJECTION, SOLUTION INTRAVENOUS at 12:14

## 2021-01-01 RX ADMIN — Medication 500 UNITS: at 14:28

## 2021-01-01 RX ADMIN — ONDANSETRON 8 MG: 2 INJECTION INTRAMUSCULAR; INTRAVENOUS at 12:12

## 2021-01-01 RX ADMIN — Medication 30 ML: at 12:05

## 2021-01-01 RX ADMIN — SODIUM CHLORIDE 25 ML/HR: 9 INJECTION, SOLUTION INTRAVENOUS at 12:00

## 2021-01-01 RX ADMIN — SODIUM CHLORIDE 25 ML/HR: 900 INJECTION, SOLUTION INTRAVENOUS at 11:46

## 2021-01-01 RX ADMIN — HEPARIN 500 UNITS: 100 SYRINGE at 14:27

## 2021-01-01 RX ADMIN — PEGFILGRASTIM 6 MG: KIT SUBCUTANEOUS at 14:30

## 2021-01-01 RX ADMIN — HEPARIN 500 UNITS: 100 SYRINGE at 14:40

## 2021-01-01 RX ADMIN — GEMCITABINE 1300 MG: 100 INJECTION, SOLUTION INTRAVENOUS at 12:25

## 2021-01-01 RX ADMIN — HEPARIN 500 UNITS: 100 SYRINGE at 14:10

## 2021-01-01 RX ADMIN — SODIUM CHLORIDE 25 ML/HR: 9 INJECTION, SOLUTION INTRAVENOUS at 12:20

## 2021-01-01 RX ADMIN — ONDANSETRON 8 MG: 2 INJECTION INTRAMUSCULAR; INTRAVENOUS at 12:14

## 2021-01-05 NOTE — PROGRESS NOTES
[facility] Progress Note Date: 2021 Name: Jame Hodgson MRN: [id number] : 1955 Peripheral Lab Draw Ms. Bolton to [facility], [place]ulatory at [phone] accompanied by self. Pt was assessed and education was provided. Ms. Julio C Bazan vitals were reviewed and patient was observed for 5 minutes prior to treatment. Visit Vitals /84 (BP 1 Location: Right arm, BP Patient Position: Sitting) Pulse 63 Temp 98.4 °F (36.9 °C) Ht 5' 5\" (1.651 m) Wt 87.4 kg (192 lb 9.6 oz) SpO2 100% BMI 32.05 kg/m² No results found for this or any previous visit (from the past 12 hour(s)). Blood obtained peripherally from RT Forearm first attempt with butterfly needle and sent to lab for CMP and CBC w/ Diff per written orders. No bleeding or hematoma noted at site. Gauze and coban applied. Ms. Wale Vargas tolerated the venipuncture, and had no complaints. Patient armband removed and shredded. Ms. Wale Vargas was discharged from [place] [address] in stable condition at 1430. Hollis Whiting Phlebotomist PCT 2021 
3:02 PM

## 2021-01-08 NOTE — PROGRESS NOTES
[facility] Progress Note Date: 2021 Name: Jesse Daily MRN: [id number] : 1955 Chemotherapy Cycle: C1 D1. Ms. Daphnie Smith was assessed and education was provided. Ms Daphnie Smith arrived via wheelchair with O2 at 3L/min nasal cannula. Ms Daphnie Smith denies pain. Ms. Urszula Hidalgo vitals were reviewed and patient was observed for 5 minutes prior to treatment. Visit Vitals /78 (BP 1 Location: Right arm, BP Patient Position: At rest;Sitting) Pulse 98 Temp 97.2 °F (36.2 °C) Resp 20 SpO2 100% Breastfeeding No  
 
 
Lab results from 2021were obtained and reviewed and found to be within limits to proceed with chemo today. Pre-medication Zofran 8 mg IVP given via port after brisk blood return obtained. Gemzar 2,400 mg IV given over approx 90 min after brisk blood return obtained. Ms. Daphnie Smith tolerated infusion, and had no complaints at this time. Patient armband removed and shredded. Ms. Daphnie Smith was discharged from [place] [address] in stable condition at [phone]. She is to return on 1/15/2021 at 0900 for her next appointment. Jeniffer Yanes RN 
2021

## 2021-01-15 NOTE — PROGRESS NOTES
[facility] Progress Note Date: January 15, 2021 Name: Yenifer Maldonado MRN: [id number] : 1955 Chemotherapy Cycle: C1 D8. Ms. Barbara Rondon was assessed and education was provided. Ms Barbara Rondon arrived via wheelchair with O2 at 3L/min nasal cannula. Pain to feet 6/10. She took pain medication prior discharge No acute distress noted. Positioned for comfort and reassured. Ms. Arjun Tatum vitals were reviewed and patient was observed for 5 minutes prior to treatment. Visit Vitals /69 (BP 1 Location: Right arm, BP Patient Position: At rest) Pulse 87 Temp 97.3 °F (36.3 °C) Resp 18 Ht 5' 5\" (1.651 m) Wt 88.5 kg (195 lb 3.2 oz) SpO2 96% BMI 32.48 kg/m² CBC WITH 3 PART DIFF Collection Time: 01/15/21  9:25 AM  
Result Value Ref Range WBC 4.4 (L) 4.5 - 13.0 K/uL  
 RBC 3.57 (L) 4.10 - 5.10 M/uL  
 HGB 11.3 (L) 12.0 - 16 g/dL HCT 30.7 (L) 36 - 48 % MCV 86.0 78 - 102 FL  
 MCH 31.7 25.0 - 35.0 PG  
 MCHC 36.8 31 - 37 g/dL  
 RDW 13.3 11.5 - 14.5 % PLATELET 654 872 - 707 K/uL NEUTROPHILS 51 40 - 70 % MIXED CELLS 10 0.1 - 17 % LYMPHOCYTES 39 14 - 44 % ABS. NEUTROPHILS 2.3 1.8 - 9.5 K/UL  
 ABS. MIXED CELLS 0.4 0.0 - 2.3 K/uL  
 ABS. LYMPHOCYTES 1.7 1.1 - 5.9 K/UL  
 DF AUTOMATED Labs and weight okay for chemotherapy Right chest medi-port accessed wit # 20 gauge, 1' heath needle. Brisk flush/blood returns noted. Blood sample obtained for cbc. N.S flush bag hung Pre-medication Zofran 8 mg IVP given via port after brisk blood return obtained. Gemzar 2,400 mg IV given over approx 90 min after brisk blood return obtained. Tolerated well. Medi-port flushed with N.S and HEPARIN per protocol, then heath needle removed. Site without redness, swelling, bleeding or tenderness. Bandaid applied. OBI neulasta applied to back of upper left arm. Green light flashed appropiately.  Patient was given care notes on same, and instructions on when to remove cassette tomorrow. Patient Vitals for the past 8 hrs: 
 Temp Pulse Resp BP SpO2  
01/15/21 1259 97.3 °F (36.3 °C) 87 18 107/69 96 % 01/15/21 0915 97.4 °F (36.3 °C) 87 20 107/72 97 % Ms. Bolton tolerated infusion, and had no complaints. Reviewed discharge instructions with patient. She verbalized understanding. Patient armband removed and shredded. Ms. Pilar Chu was discharged from [place] [address] in stable condition at 1300. She is to return on 1/26/2021 at [phone] for pre chemo labs, then 1/29/21 at 1000 am for chemotherapy. Trino Gallo RN 
January 15, 2021

## 2021-01-21 NOTE — PROGRESS NOTES
Hematology/Oncology Note Name: Vielka Zuleta Date: 2021 : 1955 Fallon Lizarraga MD  
 
 
Oncology History: Ms. Rambo Recinos  is a 77 y.o. -American woman who has follow-up for her left gluteal mass. -- In 2019 she had an ultrasound of the area and there was no evidence of a cystic component the area was described as heterogeneous measuring about 6.8 x 4.85 8.5 cm. An attempt to aspirate fluid from the left posterior hip/gluteal mass was unsuccessful. More recently she states that the mass has actually doubled in size and is now about the size of a softball.  
-- 2019. Liver, needle biopsy: mild to moderate chronic hepatitis (NOVA and MATI score 2-3) with bridging fibrosis (nova and mati score 3); see comment. -- 2019. Lumbar mass, core biopsies: Soft tissue tumor with features consistent with sarcoma. 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 Necrosis: present. Histologic grade (fnclcc): grade 3. 
-- 2020: CT C/A/P: Significant interval increase in size of the mass within the posterior left buttock which now measures 14.1 cm, compared to 5.1 cm on prior examination. A new indeterminant moderate compression fracture is present in the T5 vertebral body. 
-- S.p Neoadjuvant XRT. (Kate Lees) -- 2020 Left gluteal sarcoma resection -- Her recent CT at [facility] reported metastatic lung lesions. She was seen at [facility] recently and suggested palliative chemotherapy. The patient would like to have therapy at [facility] closer to her house. -- 2020 s.p Port placement. -- 12/10/2020 CT of chest/A/P: Large left gluteal mass present on prior exam is no longer present. Residual soft tissue and skin changes likely represent scarring. Innumerable new bilateral pulmonary nodules consistent with metastatic disease. There may also be pleural metastasis along the right major fissure inferiorly. Single borderline abnormal left periaortic lymph node unchanged. No new abnormal adenopathy. Stable T5 compression fracture. No new bony abnormalities. -- The patient would like to start her chemotherapy after holidays. -- NGS tumor profiling, liquid biopsy obtained. -- 1/8/2021 C1 D1 Gemcitabine  
-- 1/15/2021 C1 D8 Gemcitabine Subjective: Chief complaint: Gluteal mass, sarcoma History of present illness: Ms. Evelyne Cantor is a 58-year-old -American woman who states that about a year ago she noticed a small masslike lesion over her left gluteal area. She did not think much of it. However over the past few months it has started to enlarge in size. She had mass biopsied which pathology reported sarcoma,undifferentiated pleomorphic. She had completed neoadjuvant radiation therapy. Subsequently she underwent resection on 6/11/2020 by Dr. Jeff Moss. Her recent CT at [facility] reported metastatic lung lesions. She was seen at [facility] recently and suggested palliative chemotherapy. The patient would like to have therapy at [facility] closer to her house. Her daughter also considered to bring her to [facility] for cancer therapy. Today she presented here for follow up with chemotherapy. She has started  C1D1 Gemcitabine on 1/8/2021. She was accompanied by her daughter today. She reported bony aching after 2nd dose of gemcitabine and Neulasta. She reported her breathing doing better with first round of therapy. She has chronic SOB from long-standing COPD. She is oxygen dependent. She is an active smoker, currently 3-4 cigarettes a day. The patient otherwise has no other complaints. Denied fever, chills, night sweat, unintentional weight loss, skin lumps or bumps, acute bleeding or bruising issues. Denied headache, acute vision change, dizziness, chest pain, palpitation, productive cough, nausea, vomiting, abdominal pain, altered bowel habits, dysuria, new bone pain or back pain, focal numbness or weakness. Past Medical History:  
Diagnosis Date  Acid reflux  Anxiety  Bipolar 1 disorder ([facility]  Bronchitis  Cirrhosis ([facility]  Coughing  Depression  DVT (deep vein thrombosis) in pregnancy  ETOH abuse  Gastritis  Hepatic encephalopathy ([facility]  Hepatitis  Joint pain  Leukocytosis  Lumbar disc disease  Shortness of breath  Stress  Teeth decayed  Vitamin D deficiency  Weakness of left leg Allergies Allergen Reactions  Aspirin Nausea and Vomiting Past Surgical History:  
Procedure Laterality Date  EGD  HAND/FINGER SURGERY UNLISTED  HX BACK SURGERY    
 HX OVARIAN CYST REMOVAL    
 HX SPLENECTOMY Social History Socioeconomic History  Marital status:  Spouse name: Not on file  Number of children: Not on file  Years of education: Not on file  Highest education level: Not on file Occupational History  Not on file Social Needs  Financial resource strain: Not on file  Food insecurity Worry: Not on file Inability: Not on file  Transportation needs Medical: Not on file Non-medical: Not on file Tobacco Use  Smoking status: Current Every Day Smoker  Smokeless tobacco: Never Used Substance and Sexual Activity  Alcohol use: Not Currently  Drug use: Yes Types: Cocaine, Heroin  Sexual activity: Yes Lifestyle  Physical activity Days per week: Not on file Minutes per session: Not on file  Stress: Not on file Relationships  Social connections Talks on phone: Not on file Gets together: Not on file Attends Yazidi service: Not on file Active member of club or organization: Not on file Attends meetings of clubs or organizations: Not on file Relationship status: Not on file  Intimate partner violence Fear of current or ex partner: Not on file Emotionally abused: Not on file Physically abused: Not on file Forced sexual activity: Not on file Other Topics Concern  Not on file Social History Narrative  Not on file Family History Problem Relation Age of Onset  Heart Disease Mother  Stroke Mother  Heart Disease Father  Heart Disease Brother  Hypertension Brother Current Outpatient Medications Medication Sig Dispense Refill  cyclobenzaprine (FLEXERIL) 5 mg tablet  furosemide (LASIX) 40 mg tablet  mirtazapine (REMERON) 15 mg tablet TAKE 1 TABLET BY MOUTH EVERYDAY AT BEDTIME  silver sulfADIAZINE (SILVADENE) 1 % topical cream APPLY TO AFFECTED AREA 3 TIMES A DAY  diclofenac (VOLTAREN) 1 % gel APPLY 4 GM TO LEFT THIGH/BUTTOCKS 4 TIMES A DAY AS NEEDED FOR PAIN    
 ondansetron hcl (ZOFRAN) 4 mg tablet  QUEtiapine (SEROquel) 50 mg tablet TAKE 1 TABLET BY MOUTH AT BEDTIME  ARIPiprazole (ABILIFY) 5 mg tablet  esomeprazole (NEXIUM) 20 mg capsule TAKE 20 MG BY MOUTH ONCE A DAY.  ADVAIR -63 mcg/actuation inhaler  hydrOXYzine HCl (ATARAX) 25 mg tablet  albuterol-ipratropium (DUO-NEB) 2.5 mg-0.5 mg/3 ml nebu PLEASE SEE ATTACHED FOR DETAILED DIRECTIONS  6  
 DAILY-GIORGIO tablet TAKE 1 TABLET BY MOUTH EVERY DAY    
 OLANZapine (ZYPREXA) 10 mg tablet  predniSONE (DELTASONE) 10 mg tablet Take 10 mg by mouth.  albuterol (PROVENTIL HFA, VENTOLIN HFA, PROAIR HFA) 90 mcg/actuation inhaler Take 2 Puffs by inhalation.  buprenorphine-naloxone (SUBOXONE) 8-2 mg film sublingaul film 1 Each by SubLINGual route.  SUBOXONE 8-2 mg film sublingaul film DISSOLVE 1 FILM UNDER THE TONGUE TWICE DAILY  0  
 buPROPion XL (WELLBUTRIN XL) 150 mg tablet TAKE 1 TABLET BY MOUTH EVERY DAY  3  
 busPIRone (BUSPAR) 7.5 mg tablet Take 7.5 mg by mouth.  ergocalciferol (ERGOCALCIFEROL) 50,000 unit capsule TAKE 1 CAP BY MOUTH EVERY FRIDAY. 1  
 folic acid (FOLVITE) 1 mg tablet Take  by mouth daily.  hydrOXYzine pamoate (VISTARIL) 25 mg capsule Take 25 mg by mouth three (3) times daily as needed for Itching.  albuterol 2.5 mg /3 mL (0.083 %) nebu 3 mL, albuterol-ipratropium 2.5 mg-0.5 mg/3 ml nebu 3 mL 1 Dose by Nebulization route.  lactulose (KRISTALOSE) 10 gram packet Take 10 g by mouth three (3) times daily.  montelukast (SINGULAIR) 10 mg tablet Take 10 mg by mouth daily.  naproxen (NAPROSYN) 500 mg tablet Take 500 mg by mouth two (2) times daily (with meals).  omeprazole (PRILOSEC) 40 mg capsule Take 40 mg by mouth daily.  spironolactone (ALDACTONE) 25 mg tablet Take  by mouth daily.  thiamine HCL (VITAMIN B-1) 100 mg tablet Take  by mouth daily.  tiotropium (SPIRIVA WITH HANDIHALER) 18 mcg inhalation capsule Take 1 Cap by inhalation daily. Review of Systems Constitutional: Negative for chills, diaphoresis, fever, malaise/fatigue and weight loss. Respiratory: Positive for shortness of breath (chronic SOB). Negative for cough, hemoptysis and wheezing. Cardiovascular: Negative for chest pain, palpitations and leg swelling. Gastrointestinal: Negative for abdominal pain, diarrhea, heartburn, nausea and vomiting. Genitourinary: Negative for dysuria, frequency, hematuria and urgency. Musculoskeletal: Negative for joint pain and myalgias. Skin: Negative for itching and rash. Neurological: Negative for dizziness, seizures, weakness and headaches. Psychiatric/Behavioral: Negative for depression. The patient does not have insomnia. Objective:  
 
Visit Vitals /72 Pulse 94 Temp 97.9 °F (36.6 °C) Ht 5' 5\" (1.651 m) Wt 86.2 kg (190 lb) SpO2 96% BMI 31.62 kg/m² ECOG Performance Status (grade): 2 
0 - able to carry on all pre-disease activity w/out restriction 1 - restricted but able to carry out light work 2 - ambulatory and can self- care but unable to carry out work 3 - bed or chair >50% of waking hours 4 - completely disable, total care, confined to bed or chair Physical Exam 
Constitutional:   
   Appearance: Normal appearance. Comments: On chronic oxygen HENT:  
   Head: Normocephalic and atraumatic. Eyes:  
   Pupils: Pupils are equal, round, and reactive to light. Neck: Musculoskeletal: Neck supple. Cardiovascular:  
   Rate and Rhythm: Normal rate and regular rhythm. Heart sounds: Normal heart sounds. Pulmonary:  
   Effort: Pulmonary effort is normal.  
   Breath sounds: Normal breath sounds. Abdominal:  
   General: Bowel sounds are normal.  
   Palpations: Abdomen is soft. Tenderness: There is no abdominal tenderness. There is no guarding. Musculoskeletal: Normal range of motion. Right lower leg: No edema. Left lower leg: No edema. Comments: Left gluteal wound: clean, healing well, no redness or infected. Skin: 
   General: Skin is warm. Neurological:  
   General: No focal deficit present. Mental Status: She is alert and oriented to person, place, and time. Mental status is at baseline. Diagnostics: No results found for this or any previous visit (from the past 96 hour(s)). Imaging: No results found for this or any previous visit. No results found for this or any previous visit. Results for orders placed in visit on 11/25/20 CT CHEST ABD PELV W CONT Assessment: # Soft tissue mass, left gluteal area:  
# Undifferentiated pleomorphic sarcoma # Lung metastases # Bone pain, Neulasta-related Plan: # Undifferentiated pleomorphic sarcoma # Lung metastases # Soft tissue mass, left gluteal area:  
-- In February 2019 she had an ultrasound of the area and there was no evidence of a cystic component the area was described as heterogeneous measuring about 6.8 x 4.85 8.5 cm. An attempt to aspirate fluid from the left posterior hip/gluteal mass was unsuccessful. More recently she states that the mass has actually doubled in size and is now about the size of a softball.  
-- 4/5/2019. Liver, needle biopsy: mild to moderate chronic hepatitis (NOVA and MATI score 2-3) with bridging fibrosis (nova and mati score 3); see comment. -- 11/20/2019. Lumbar mass, core biopsies: Soft tissue tumor with features consistent with sarcoma. 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 Necrosis: present. Histologic grade (fnclcc): grade 3. 
-- 1/31/2020: CT C/A/P: Significant interval increase in size of the mass within the posterior left buttock which now measures 14.1 cm, compared to 5.1 cm on prior examination. A new indeterminant moderate compression fracture is present in the T5 vertebral body. Mild scarring is present in the lingula. -- S.p Neoadjuvant XRT. (Hermelinda Claudio) -- 6/11/2020 Left gluteal sarcoma resection - Dr. Julia Valero at [facility] orthopedic oncology department. Preliminary report faxed to us which stated pleomorphic sarcoma s/p resection. She was planned to obtain chest CT in 2 months for interval assessment of lung lesion. -- Her recent CT at [facility] reported metastatic lung lesions. She was seen at [facility] recently and suggested palliative chemotherapy. The patient would like to have therapy at [facility] closer to her house. -- 12/08/2020 s.p Port placement. -- 12/10/2020 CT of chest/A/P: Large left gluteal mass present on prior exam is no longer present. Residual soft tissue and skin changes likely represent scarring. Innumerable new bilateral pulmonary nodules consistent with metastatic disease. There may also be pleural metastasis along the right major fissure inferiorly. Single borderline abnormal left periaortic lymph node unchanged. No new abnormal adenopathy. Stable T5 compression fracture. No new bony abnormalities. -- The patient would like to start her chemotherapy after holidays. -- NGS tumor profiling, liquid biopsy obtained. -- 1/8/2021 C1 D1 Gemcitabine  
-- 1/15/2021 C1 D8 Gemcitabine 
-- She has tolerated chemotherapy fairly well with no high graded toxicities. Clinical stable, breathing reported improving. Recent labs reviewed. Plan: 
-- We will continue single agent Gemcitabine, C2 D1 on 1/29/2021 tentatively -- Monitor labs: CBC, CMP 
-- We will obtain interval imaging after 4 cycles of therapy. -- We will see the patient back to the clinic in 4 weeks. Always sooner if required. Single agent Gemcitabine:  
Days 1 and 8: Gemcitabine 1,200mg/m2 IV over 90 to 120 minutes. Repeat cycle every 3 weeks. Growth factor support. # Chronic Anemia # Anemia, chemotherapy-related 
-- Will monitor CBC, CMP, iron profile, ferritin and replacement accordingly # Generalized bone pain, likely Neulasta related 
-- Advised to take antihistamines such as Claritin or Benadryl or NSAIDs Orders Placed This Encounter  cyclobenzaprine (FLEXERIL) 5 mg tablet Ms. Romaine Dawkins has a reminder for a \"due or due soon\" health maintenance. I have asked that she contact her primary care provider for follow-up on this health maintenance. All of patient's questions answered to their apparent satisfaction. They verbally show understanding and agreement with aforementioned plan. Angella Beverly MD 
1/21/2021 About 25 minutes were spent for this encounter with more than 50% of the time spent in face-to-face counseling, discussing on diagnosis and management plan going forward, and co-ordination of care. Parts of this document has been produced using Dragon dictation system. Unrecognized errors in transcription may be present. Please do not hesitate to reach out for any questions or clarifications.  
 
 
CC: Alice Lizarraga MD

## 2021-01-26 NOTE — PROGRESS NOTES
[facility] Progress Note Date: 2021 Name: Tam Gunter MRN: [id number] : 1955 Peripheral Lab Draw Ms. Bolton to [facility], ambulatory at [phone] accompanied by self. Pt was assessed and education was provided. Ms. Malini Yip vitals were reviewed and patient was observed for 5 minutes prior to treatment. Visit Vitals /75 (BP 1 Location: Right arm, BP Patient Position: Sitting) Pulse 92 Temp 98.6 °F (37 °C) Ht 5' 5\" (1.651 m) Wt 86.5 kg (190 lb 12.8 oz) SpO2 96% BMI 31.75 kg/m² No results found for this or any previous visit (from the past 12 hour(s)). Blood obtained peripherally from Lt Forearm second attempt with butterfly needle and sent to lab for CBC w/ Diff and CMP per written orders. No bleeding or hematoma noted at site. Gauze and coban applied. Ms. Eduardo Estrada tolerated the venipuncture, and had no complaints. Patient armband removed and shredded. Ms. Eduardo Estrada was discharged from [place] [address] in stable condition at [phone]. Luis Santos Phlebotomist PCT 2021 
3:13 PM

## 2021-02-01 NOTE — PROGRESS NOTES
[facility] Progress Note Date: 2021 Name: Robbin Malik MRN: [id number] : 1955 GEMZAR- D1, D8, Q21 DAYS 
(CYCLE 2, D1 TODAY) Ms. Marbin Lerma arrived to [facility] at 36. Ms Marbin Lerma arrived via wheelchair with portable home oxygen concentrator at 3L/min nasal cannula. Pt was assessed and education was provided. Pt c/o pain in her legs (see flowsheet). States she ran out of tylenol at home which is usually what she takes to relieve the pain. Otherwise denies complaints. Ms. Wylie Lower vitals were reviewed. Visit Vitals /67 (BP 1 Location: Right upper arm, BP Patient Position: Sitting) Pulse (!) 108 Temp 97.7 °F (36.5 °C) Resp 20 SpO2 94% CMP results from 2021 & 2021 were obtained and reviewed and are within tx plan parameters to proceed w/ tx today. Verbal order obtained from NP Otoo to administer Tylenol 975mg po for pt c/o leg pain. Administered as ordered. Pt's R upper chest port accessed. Brisk blood return/ flushes without difficulty. NS infusing as ordered at [facility]. Pre-medication Zofran 8 mg IVP administered as ordered followed by NS flush. Gemzar 2400 mg IV administered as ordered followed by NS flush. Ms. Marbin Lerma tolerated well without complaints. Discharge/ follow-up instructions discussed w/ pt. Pt verbalized understanding. Patient armband removed and shredded. Ms. Marbin Lerma was discharged from [place] [address] in stable condition at 1335. She is to return on 21 at [phone] for her next appointment (pre-chemo labs). Giselle Catherine RN 
2021

## 2021-02-05 NOTE — PROGRESS NOTES
[facility] Progress Note Date: 2021 Name: Jenny Parrish MRN: [id number] : 1955 Ms. Vu Reich arrived in the [facility] today at 56, in stable condition, here for Cycle 2, Day 8, IV Gemzar Chemotherapy Regimen (Gemzar on days 1 & 8 of every 21 Day Cycle.)   She was assessed and education was provided. Ms. Jill Larsen vitals were reviewed. Visit Vitals /74 (BP 1 Location: Right upper arm, BP Patient Position: Sitting) Pulse (!) 105 Temp 98.5 °F (36.9 °C) Resp 20 Ht 5' 5\" (1.651 m) Wt 88.2 kg (194 lb 6.4 oz) SpO2 97% Breastfeeding No  
BMI 32.35 kg/m² Her right chest single lumen port was accessed without incident at 1045, and brisk blood return was obtained. Blood for the ordered CBC was drawn, and processed on site. Lab results were obtained and reviewed, and were as follows: The preliminary Platelet Count was noted to be 312,000. Recent Results (from the past 12 hour(s)) CBC WITH 3 PART DIFF Collection Time: 21 10:45 AM  
Result Value Ref Range WBC 2.4 (L) 4.5 - 13.0 K/uL  
 RBC 3.17 (L) 4.10 - 5.10 M/uL HGB 9.9 (L) 12.0 - 16 g/dL HCT 27.9 (L) 36 - 48 % MCV 88.0 78 - 102 FL  
 MCH 31.2 25.0 - 35.0 PG  
 MCHC 35.5 31 - 37 g/dL  
 RDW 14.9 (H) 11.5 - 14.5 % NEUTROPHILS 21 (L) 40 - 70 % MIXED CELLS 25 (H) 0.1 - 17 % LYMPHOCYTES 54 (H) 14 - 44 % ABS. NEUTROPHILS 0.5 (L) 1.8 - 9.5 K/UL  
 ABS. MIXED CELLS 0.6 0.0 - 2.3 K/uL  
 ABS. LYMPHOCYTES 1.3 1.1 - 5.9 K/UL  
 DF AUTOMATED The low ANC listed above was reported to Dr. Damir Ruiz at [phone], and order was received from Dr. Louvella Castleman, to [facility] Chemotherapy today, and administer Neulasta injection. CHEMO WAS HELD TODAY AS ORDERED, FOR ANC < 1,000. Her port was flushed well per policy with NS & Heparin, and then the heath needle was removed and gauze/bandaid was applied.   
 
 
Neulasta 6 mg was administered SQ in the back of her right arm at 1125, per order, and without incident. Ms. Tiny Child voiced no complaints. Ms. Tiny Child was discharged from [place] [address] in stable condition at 1130. Marylin Logan She is to return on Tuesday, 2-16-21 at [phone], for her next appointment, for pre-chemo labs. And then, Cycle 3, Day 1, Gemzar chemotherapy is scheduled on Friday, 2-19-21 at 1000. Venkata Arenas RN February 5, 2021 
11:16 AM

## 2021-02-16 NOTE — PROGRESS NOTES
[facility] Progress Note Date: 2021 Name: Yousuf Lopez MRN: [id number] : 1955 Peripheral Lab Draw Ms. Bolton to [facility], ambulatory at 1500 accompanied by self. Pt was assessed and education was provided. Ms. Irene Hyatt vitals were reviewed and patient was observed for 5 minutes prior to treatment. Visit Vitals /67 (BP 1 Location: Right upper arm, BP Patient Position: Sitting) Pulse 98 Temp 98.6 °F (37 °C) Ht 5' 5\" (1.651 m) Wt 86.5 kg (190 lb 12.8 oz) SpO2 97% BMI 31.75 kg/m² No results found for this or any previous visit (from the past 12 hour(s)). Blood obtained peripherally from LT Forearm first attempt with butterfly needle and sent to lab for CBC w/ Diff and CMP per written orders. No bleeding or hematoma noted at site. Gauze and coban applied. Ms. Thea Wright tolerated the venipuncture, and had no complaints. Patient armband removed and shredded. Ms. Thea Wright was discharged from [place] [address] in stable condition at 1510. Tom Clarke Phlebotomist PCT 2021 
3:57 PM

## 2021-02-19 NOTE — PROGRESS NOTES
[facility] Progress Note Date: 2021 Name: Yousuf Lopez MRN: [id number] : 1955 GEMZAR- D1, D8- Q21 DAYS 
(CYCLE 3, D1 TODAY) Ms. Thea Wright arrived to [address] [place] [address] at 12. Ms Thea Wright arrived via wheelchair with portable home oxygen concentrator at 3L/min nasal cannula. Pt was assessed and education was provided. Ms. Irene Hyatt vitals were reviewed. Visit Vitals /77 (BP 1 Location: Right arm, BP Patient Position: Sitting) Pulse (!) 109 Temp 98.3 °F (36.8 °C) Resp 22 SpO2 97% Lab results from 21 were obtained and reviewed and are within tx plan parameters to proceed w/ tx today. Verbal order obtained from NP Otoo to administer Tylenol 975mg po for pt c/o leg pain. Administered as ordered. Pt's R upper chest port accessed. Brisk blood return/ flushes without difficulty. NS infusing as ordered at [facility]. Pre-medication Zofran 8 mg IVP administered as ordered followed by NS flush. Gemzar 2400 mg IV administered as ordered followed by NS flush. Ms. Thea Wright tolerated well without complaints. Discharge/ follow-up instructions discussed w/ pt. Pt verbalized understanding. Patient armband removed and shredded. Ms. Thea Wright was discharged from [place] [address] in stable condition at 1325. She is to return on 21 at 1300 for her next appointment. Vamsi Washburn RN 2021

## 2021-02-20 NOTE — PROGRESS NOTES
Hematology/Oncology Note Name: Sarah Castillo Date: 2021 : 1955 Omar Lizarraga MD  
 
 
Oncology History: Ms. Ryan Jane  is a 77 y.o. -American woman who has follow-up for her left gluteal mass. -- In 2019 she had an ultrasound of the area and there was no evidence of a cystic component the area was described as heterogeneous measuring about 6.8 x 4.85 8.5 cm. An attempt to aspirate fluid from the left posterior hip/gluteal mass was unsuccessful. More recently she states that the mass has actually doubled in size and is now about the size of a softball.  
-- 2019. Liver, needle biopsy: mild to moderate chronic hepatitis (NOVA and MATI score 2-3) with bridging fibrosis (nova and mati score 3); see comment. -- 2019. Lumbar mass, core biopsies: Soft tissue tumor with features consistent with sarcoma. 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 Necrosis: present. Histologic grade (fnclcc): grade 3. 
-- 2020: CT C/A/P: Significant interval increase in size of the mass within the posterior left buttock which now measures 14.1 cm, compared to 5.1 cm on prior examination. A new indeterminant moderate compression fracture is present in the T5 vertebral body. 
-- S.p Neoadjuvant XRT. (Kolila) -- 2020 Left gluteal sarcoma resection -- Her recent CT at [address] [place] [address] reported metastatic lung lesions. She was seen at [address] [place] [address] recently and suggested palliative chemotherapy. The patient would like to have therapy at [facility] closer to her house. -- 2020 s.p Port placement. -- 12/10/2020 CT of chest/A/P: Large left gluteal mass present on prior exam is no longer present. Residual soft tissue and skin changes likely represent scarring. Innumerable new bilateral pulmonary nodules consistent with metastatic disease. There may also be pleural metastasis along the right major fissure inferiorly. Single borderline abnormal left periaortic lymph node unchanged. No new abnormal adenopathy. Stable T5 compression fracture. No new bony abnormalities. -- The patient would like to start her chemotherapy after holidays. -- NGS tumor profiling, liquid biopsy obtained. -- 1/8/2021 C1 D1 Gemcitabine  
-- 1/15/2021 C1 D8 Gemcitabine 
-- 1/29/2021 C2 D1 Gemcitabine 
-- 2/5/2021 C2 D8 Gemcitabine was held d/t low ANC 
-- 2/19/2021 C3 D1 Subjective: Chief complaint: Gluteal mass, sarcoma History of present illness: Ms. Boston Abarca is a 53-year-old -American woman who states that about a year ago she noticed a small masslike lesion over her left gluteal area. She did not think much of it. However over the past few months it has started to enlarge in size. She had mass biopsied which pathology reported sarcoma,undifferentiated pleomorphic. She had completed neoadjuvant radiation therapy. Subsequently she underwent resection on 6/11/2020 by Dr. Dev Villalobos. Her recent CT at [facility] reported metastatic lung lesions. She was seen at [facility] recently and suggested palliative chemotherapy. The patient would like to have therapy at [facility] closer to her house. Her daughter also considered to bring her to [facility] for cancer therapy. Today she presented here for follow up with chemotherapy. She has started  C1D1 Gemcitabine on 1/8/2021. She was accompanied by her family today. She reported some fatigue and decreasing appetite since last visit. She reported her breathing doing stable. She has chronic SOB from long-standing COPD. She is oxygen dependent. She is an active smoker, currently 3-4 cigarettes a day. The patient otherwise has no other complaints. Denied fever, chills, night sweat, unintentional weight loss, skin lumps or bumps, acute bleeding or bruising issues.  Denied headache, acute vision change, dizziness, chest pain, palpitation, productive cough, nausea, vomiting, abdominal pain, altered bowel habits, dysuria, new bone pain or back pain, focal numbness or weakness. Past Medical History:  
Diagnosis Date  Acid reflux  Anxiety  Bipolar 1 disorder ([facility]  Bronchitis  [facility]  Coughing  Depression  DVT (deep vein thrombosis) in pregnancy  ETOH abuse  Gastritis  Hepatic encephalopathy ([facility]  Hepatitis  Joint pain  Leukocytosis  Lumbar disc disease  Shortness of breath  Stress  Teeth decayed  Vitamin D deficiency  Weakness of left leg Allergies Allergen Reactions  Aspirin Nausea and Vomiting Past Surgical History:  
Procedure Laterality Date  EGD  HAND/FINGER SURGERY UNLISTED  HX BACK SURGERY    
 HX OVARIAN CYST REMOVAL    
 HX SPLENECTOMY  IR INSERT TUNL CVC W PORT OVER 5 YEARS  12/8/2020 Social History Socioeconomic History  Marital status:  Spouse name: Not on file  Number of children: Not on file  Years of education: Not on file  Highest education level: Not on file Occupational History  Not on file Social Needs  Financial resource strain: Not on file  Food insecurity Worry: Not on file Inability: Not on file  Transportation needs Medical: Not on file Non-medical: Not on file Tobacco Use  Smoking status: Current Every Day Smoker  Smokeless tobacco: Never Used Substance and Sexual Activity  Alcohol use: Not Currently  Drug use: Yes Types: Cocaine, Heroin  Sexual activity: Yes Lifestyle  Physical activity Days per week: Not on file Minutes per session: Not on file  Stress: Not on file Relationships  Social connections Talks on phone: Not on file Gets together: Not on file Attends [employer]ology service: Not on file Active member of club or organization: Not on file Attends meetings of clubs or organizations: Not on file Relationship status: Not on file  Intimate partner violence   Fear of current or ex partner: Not on file Emotionally abused: Not on file Physically abused: Not on file Forced sexual activity: Not on file Other Topics Concern  Not on file Social History Narrative  Not on file Family History Problem Relation Age of Onset  Heart Disease Mother  Stroke Mother  Heart Disease Father  Heart Disease Brother  Hypertension Brother Current Outpatient Medications Medication Sig Dispense Refill  lidocaine-prilocaine (EMLA) topical cream Apply  to affected area as needed (30-60 minutes before port is to be accessed). 30 g 0  
 cyclobenzaprine (FLEXERIL) 5 mg tablet  furosemide (LASIX) 40 mg tablet  mirtazapine (REMERON) 15 mg tablet TAKE 1 TABLET BY MOUTH EVERYDAY AT BEDTIME  silver sulfADIAZINE (SILVADENE) 1 % topical cream APPLY TO AFFECTED AREA 3 TIMES A DAY  diclofenac (VOLTAREN) 1 % gel APPLY 4 GM TO LEFT THIGH/BUTTOCKS 4 TIMES A DAY AS NEEDED FOR PAIN    
 ondansetron hcl (ZOFRAN) 4 mg tablet  QUEtiapine (SEROquel) 50 mg tablet TAKE 1 TABLET BY MOUTH AT BEDTIME  ARIPiprazole (ABILIFY) 5 mg tablet  esomeprazole (NEXIUM) 20 mg capsule TAKE 20 MG BY MOUTH ONCE A DAY.  ADVAIR -95 mcg/actuation inhaler  hydrOXYzine HCl (ATARAX) 25 mg tablet  albuterol-ipratropium (DUO-NEB) 2.5 mg-0.5 mg/3 ml nebu PLEASE SEE ATTACHED FOR DETAILED DIRECTIONS  6  
 DAILY-GIORGIO tablet TAKE 1 TABLET BY MOUTH EVERY DAY    
 OLANZapine (ZYPREXA) 10 mg tablet  predniSONE (DELTASONE) 10 mg tablet Take 10 mg by mouth.  albuterol (PROVENTIL HFA, VENTOLIN HFA, PROAIR HFA) 90 mcg/actuation inhaler Take 2 Puffs by inhalation.  buprenorphine-naloxone (SUBOXONE) 8-2 mg film sublingaul film 1 Each by SubLINGual route.     
 SUBOXONE 8-2 mg film sublingaul film DISSOLVE 1 FILM UNDER THE TONGUE TWICE DAILY  0  
 buPROPion XL (WELLBUTRIN XL) 150 mg tablet TAKE 1 TABLET BY MOUTH EVERY DAY  3  
 busPIRone (BUSPAR) 7.5 mg tablet Take 7.5 mg by mouth.  ergocalciferol (ERGOCALCIFEROL) 50,000 unit capsule TAKE 1 CAP BY MOUTH EVERY FRIDAY. 1  
 folic acid (FOLVITE) 1 mg tablet Take  by mouth daily.  hydrOXYzine pamoate (VISTARIL) 25 mg capsule Take 25 mg by mouth three (3) times daily as needed for Itching.  albuterol 2.5 mg /3 mL (0.083 %) nebu 3 mL, albuterol-ipratropium 2.5 mg-0.5 mg/3 ml nebu 3 mL 1 Dose by Nebulization route.  lactulose (KRISTALOSE) 10 gram packet Take 10 g by mouth three (3) times daily.  montelukast (SINGULAIR) 10 mg tablet Take 10 mg by mouth daily.  naproxen (NAPROSYN) 500 mg tablet Take 500 mg by mouth two (2) times daily (with meals).  omeprazole (PRILOSEC) 40 mg capsule Take 40 mg by mouth daily.  spironolactone (ALDACTONE) 25 mg tablet Take  by mouth daily.  thiamine HCL (VITAMIN B-1) 100 mg tablet Take  by mouth daily.  tiotropium (SPIRIVA WITH HANDIHALER) 18 mcg inhalation capsule Take 1 Cap by inhalation daily. Review of Systems Constitutional: Negative for chills, diaphoresis, fever, malaise/fatigue and weight loss. Respiratory: Positive for shortness of breath (chronic SOB). Negative for cough, hemoptysis and wheezing. Cardiovascular: Negative for chest pain, palpitations and leg swelling. Gastrointestinal: Negative for abdominal pain, diarrhea, heartburn, nausea and vomiting. Genitourinary: Negative for dysuria, frequency, hematuria and urgency. Musculoskeletal: Negative for joint pain and myalgias. Skin: Negative for itching and rash. Neurological: Negative for dizziness, seizures, weakness and headaches. Psychiatric/Behavioral: Negative for depression. The patient does not have insomnia. Objective:  
 
Visit Vitals /69 (BP Patient Position: Sitting) Pulse (!) 106 Temp 99.6 °F (37.6 °C) (Oral) Resp 18 Wt 83.5 kg (184 lb) SpO2 98% BMI 30.62 kg/m² ECOG Performance Status (grade): 2 
0 - able to carry on all pre-disease activity w/out restriction 1 - restricted but able to carry out light work 2 - ambulatory and can self- care but unable to carry out work 3 - bed or chair >50% of waking hours 4 - completely disable, total care, confined to bed or chair Physical Exam 
Constitutional:   
   Appearance: Normal appearance. Comments: On chronic oxygen HENT:  
   Head: Normocephalic and atraumatic. Eyes:  
   Pupils: Pupils are equal, round, and reactive to light. Neck: Musculoskeletal: Neck supple. Cardiovascular:  
   Rate and Rhythm: Normal rate and regular rhythm. Heart sounds: Normal heart sounds. Pulmonary:  
   Effort: Pulmonary effort is normal.  
   Breath sounds: Normal breath sounds. Abdominal:  
   General: Bowel sounds are normal.  
   Palpations: Abdomen is soft. Tenderness: There is no abdominal tenderness. There is no guarding. Musculoskeletal: Normal range of motion. Right lower leg: No edema. Left lower leg: No edema. Comments: Left gluteal wound: clean, healing well, no redness or infected. Skin: 
   General: Skin is warm. Neurological:  
   General: No focal deficit present. Mental Status: She is alert and oriented to person, place, and time. Mental status is at baseline. Diagnostics: No results found for this or any previous visit (from the past 96 hour(s)). Imaging: 
Results for orders placed in visit on 10/27/20 IR INSERT TUNL CVC W PORT OVER 5 YEARS No results found for this or any previous visit. Results for orders placed in visit on 11/25/20 CT CHEST ABD PELV W CONT Assessment: # Soft tissue mass, left gluteal area:  
# Undifferentiated pleomorphic sarcoma # Lung metastases # Bone pain, Neulasta-related Plan: # Undifferentiated pleomorphic sarcoma # Lung metastases # Soft tissue mass, left gluteal area:  
-- In February 2019 she had an ultrasound of the area and there was no evidence of a cystic component the area was described as heterogeneous measuring about 6.8 x 4.85 8.5 cm. An attempt to aspirate fluid from the left posterior hip/gluteal mass was unsuccessful. More recently she states that the mass has actually doubled in size and is now about the size of a softball.  
-- 4/5/2019. Liver, needle biopsy: mild to moderate chronic hepatitis (NOVA and MATI score 2-3) with bridging fibrosis (nova and mati score 3); see comment. -- 11/20/2019. Lumbar mass, core biopsies: Soft tissue tumor with features consistent with sarcoma. 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 Necrosis: present. Histologic grade (fnclcc): grade 3. 
-- 1/31/2020: CT C/A/P: Significant interval increase in size of the mass within the posterior left buttock which now measures 14.1 cm, compared to 5.1 cm on prior examination. A new indeterminant moderate compression fracture is present in the T5 vertebral body. Mild scarring is present in the lingula. -- S.p Neoadjuvant XRT. (Almita Moran) -- 6/11/2020 Left gluteal sarcoma resection - Dr. Yennifer Velázquez at [facility] orthopedic oncology department. Preliminary report faxed to us which stated pleomorphic sarcoma s/p resection. She was planned to obtain chest CT in 2 months for interval assessment of lung lesion. -- Her recent CT at [facility] reported metastatic lung lesions. She was seen at [facility] recently and suggested palliative chemotherapy. The patient would like to have therapy at [facility] closer to her house. -- 12/08/2020 s.p Port placement. -- 12/10/2020 CT of chest/A/P: Large left gluteal mass present on prior exam is no longer present. Residual soft tissue and skin changes likely represent scarring. Innumerable new bilateral pulmonary nodules consistent with metastatic disease. There may also be pleural metastasis along the right major fissure inferiorly. Single borderline abnormal left periaortic lymph node unchanged. No new abnormal adenopathy. Stable T5 compression fracture. No new bony abnormalities. -- The patient would like to start her chemotherapy after holidays. -- NGS tumor profiling, liquid biopsy obtained. -- 1/8/2021 C1 D1 Gemcitabine  
-- 1/15/2021 C1 D8 Gemcitabine 
-- 1/29/2021 C2 D1 Gemcitabine 
-- 2/5/2021 C2 D8 Gemcitabine was held d/t low ANC 
-- 2/19/2021 C3 D1 
-- She has tolerated chemotherapy fairly well with no high graded toxicities. Clinical stable, breathing reported improving. Recent labs reviewed. Plan: 
-- We will continue single agent Gemcitabine, C3 D8 on 2/26/2021 tentatively -- Monitor labs: CBC, CMP 
-- We will obtain interval imaging after 4 cycles of therapy. -- We will see the patient back to the clinic in 4 weeks. Always sooner if required. Single agent Gemcitabine:  
Days 1 and 8: Gemcitabine 1,200mg/m2 IV over 90 to 120 minutes. Repeat cycle every 3 weeks. Growth factor support. # Chronic Anemia # Anemia, chemotherapy-related 
-- Will monitor CBC, CMP, iron profile, ferritin and replacement accordingly # Poor appetite 
-- Ensure BID 
-- Advised to keep hydrated. # Generalized bone pain, likely Neulasta related 
-- Advised to take antihistamines such as Claritin or Benadryl or NSAIDs No orders of the defined types were placed in this encounter. Ms. Wale Vargas has a reminder for a \"due or due soon\" health maintenance. I have asked that she contact her primary care provider for follow-up on this health maintenance. All of patient's questions answered to their apparent satisfaction. They verbally show understanding and agreement with aforementioned plan. Deja Poole MD 
2/22/2021 About 25 minutes were spent for this encounter with more than 50% of the time spent in face-to-face counseling, discussing on diagnosis and management plan going forward, and co-ordination of care.  
Parts of this document has been produced using Dragon dictation system. Unrecognized errors in transcription may be present. Please do not hesitate to reach out for any questions or clarifications.  
 
 
CC: Shaniqua Lizarraga MD

## 2021-02-26 NOTE — PROGRESS NOTES
[facility] Progress Note Date: 2021 Name: Radha Jin MRN: [id number] : 1955 GEMZAR- D1, D8, Q21 DAYS 
(CYCLE 2, D1 TODAY) Ms. Boston Abarca arrived to [facility] at 80. Ms Boston Abarca arrived via wheelchair with portable home oxygen concentrator at 3L/min nasal cannula. Pt was assessed and education was provided. Pt c/o pain in her legs (see flowsheet). States she ran out of tylenol at home which is usually what she takes to relieve the pain. Otherwise denies complaints. Ms. Christina Guidry vitals were reviewed. Visit Vitals /70 (BP 1 Location: Right upper arm, BP Patient Position: At rest;Sitting) Pulse 94 Temp 99.5 °F (37.5 °C) Resp 20 SpO2 98% CMP results from 2021 were obtained and reviewed and are within tx plan parameters to proceed w/ tx today. CBC done in house today and results were within limits to proceed with chemo today. Pt's R upper chest port accessed. Brisk blood return/ flushes without difficulty. NS infusing as ordered at [facility]. Pre-medication Zofran 8 mg IVP administered as ordered followed by NS flush. Gemzar 2400 mg IV administered via port after brisk blood return obtained followed by NS flush and flush with 500 units heparin. Neulasta 6 mg OBI placed on upper left arm at 1613. Ms. Boston Abarca tolerated well without complaints. Discharge/ follow-up instructions discussed w/ pt. Pt verbalized understanding. Patient armband removed and shredded. Ms. Boston Abarca was discharged from [place] [address] in stable condition at [phone]. She is to return on 3/9/21 at 26 for her next appointment (pre-chemo labs). Bertrand Casey RN 
2021

## 2021-03-09 NOTE — PROGRESS NOTES
[address] [facility] Progress Note Date: 2021 Name: Juan Ramon Parra MRN: [id number] : 1955 Ms. Shaylee Cotter was assessed and education was provided. Ms. Darin Guevara vitals were reviewed and patient was observed for 5 minutes prior to treatment. Visit Vitals /70 (BP 1 Location: Right upper arm, BP Patient Position: Sitting) Pulse 99 Temp 98.9 °F (37.2 °C) Resp 20 Ht 5' 5\" (1.651 m) Wt 86.9 kg (191 lb 9.6 oz) BMI 31.88 kg/m² Pre chemo CBC and CMP drawn and taken to hospital lab for testing. Ms. Shaylee Cotter was discharged from [place] [address] in stable condition at 1505. She is to return on 3/12/202 at 1100 for her next appointment.  
 
Sanju Liriano RN 
2021 
3:08 PM

## 2021-03-12 NOTE — ADDENDUM NOTE
Encounter addended by: Maria Esther Vargas RN on: 3/12/2021 1:40 PM 
 Actions taken: Flowsheet accepted

## 2021-03-12 NOTE — PROGRESS NOTES
[facility] Progress Note Date: 2021 Name: Dottie Washington MRN: [id number] : 1955 Ms. Evelyne Cantor arrived in the [facility] today at [phone], in stable condition, here for Cycle 4, Day 1, IV GEMZAR Chemotherapy Infusion (Days 1 & 8, of Every 21 Day Cycle). She was assessed and education was provided. Ms. Ila Thurman vitals were reviewed. Visit Vitals /70 (BP 1 Location: Right upper arm, BP Patient Position: Sitting) Pulse (!) 103 Temp 99.3 °F (37.4 °C) Resp 20 SpO2 97% Breastfeeding No  
 
 
 
 
 
Lab results were obtained reviewed. Her most recent pre-chemo labs from 3-9-21 were reviewed, and were all noted to be satisfactory for treatment today, and were as follows: 
 
 
 
Results for Brandon Basurto (MRN [id number]) Ref. Range 3/9/2021 15:00 WBC Latest Ref Range: 4.6 - 13.2 K/uL 17.7 (H) RBC Latest Ref Range: 4.20 - 5.30 M/uL 2.56 (L) HGB Latest Ref Range: 12.0 - 16.0 g/dL 8.3 (L) HCT Latest Ref Range: 35.0 - 45.0 % 25.7 (L) MCV Latest Ref Range: 74.0 - 97.0 .4 (H) MCH Latest Ref Range: 24.0 - 34.0 PG 32.4 MCHC Latest Ref Range: 31.0 - 37.0 g/dL 32.3 RDW Latest Ref Range: 11.6 - 14.5 % 24.3 (H) PLATELET Latest Ref Range: 135 - 420 K/uL 134 (L) MPV Latest Ref Range: 9.2 - 11.8 FL 9.9 NEUTROPHILS Latest Ref Range: 42 - 75 % 77 (H) LYMPHOCYTES Latest Ref Range: 20 - 51 % 12 (L) MONOCYTES Latest Ref Range: 2 - 9 % 7 EOSINOPHILS Latest Ref Range: 0 - 5 % 2  
BASOPHILS Latest Ref Range: 0 - 3 % 0  
METAMYELOCYTES Latest Ref Range: 0 % 2 (H) DF Latest Units:   MANUAL  
ABS. NEUTROPHILS Latest Ref Range: 1.8 - 8.0 K/UL 13.6 (H)  
ABS. LYMPHOCYTES Latest Ref Range: 0.8 - 3.5 K/UL 2.1 ABS. MONOCYTES Latest Ref Range: 0 - 1.0 K/UL 1.2 (H)  
ABS. EOSINOPHILS Latest Ref Range: 0.0 - 0.4 K/UL 0.4  
ABS. BASOPHILS Latest Ref Range: 0.0 - 0.06 K/UL 0.0  
RBC COMMENTS Latest Units:   MACROCYTOSIS. .. PLATELET COMMENTS Latest Units:   ADEQUATE PLATELETS Sodium Latest Ref Range: 136 - 145 mmol/L 137 Potassium Latest Ref Range: 3.5 - 5.5 mmol/L 4.4 Chloride Latest Ref Range: 100 - 111 mmol/L 100 CO2 Latest Ref Range: 21 - 32 mmol/L 31 Anion gap Latest Ref Range: 3.0 - 18 mmol/L 6 Glucose Latest Ref Range: 74 - 99 mg/dL 123 (H) BUN Latest Ref Range: 7.0 - 18 MG/DL 11 Creatinine Latest Ref Range: 0.6 - 1.3 MG/DL 0.77 BUN/Creatinine ratio Latest Ref Range: 12 - 20   14 Calcium Latest Ref Range: 8.5 - 10.1 MG/DL 8.2 (L)  
GFR est non-AA Latest Ref Range: >60 ml/min/1.73m2 >60  
GFR est AA Latest Ref Range: >60 ml/min/1.73m2 >60 Bilirubin, total Latest Ref Range: 0.2 - 1.0 MG/DL 0.5 Protein, total Latest Ref Range: 6.4 - 8.2 g/dL 8.1 Albumin Latest Ref Range: 3.4 - 5.0 g/dL 2.5 (L) Globulin Latest Ref Range: 2.0 - 4.0 g/dL 5.6 (H) A-G Ratio Latest Ref Range: 0.8 - 1.7   0.4 (L) ALT Latest Ref Range: 13 - 56 U/L 16 AST Latest Ref Range: 10 - 38 U/L 19 Alk. phosphatase Latest Ref Range: 45 - 117 U/L 253 (H) Her right chest single lumen port was accessed without incident at 1200, and brisk blood return was obtained. 250 ml IV Bag NS was initiated to infuse @ KVO PRN throughout treatment today. The following pre-medication was administered per order and without incident: Zofran 8 mg IV. The following chemotherapy medication was administered as follows: Gemcitabine (GEMZAR) 2,400 mg IV (1,200 mg/m2), was administered over approximately 90 minutes, per order, and without incident. After completion of all ordered IV medications, her port was flushed very well per protocol with NS & Heparin, and then the heath needle was removed and gauze/bandaid was applied. Ms. Thea Wright tolerated her treatment very well today, and voiced no complaints. Ms. Thea Wright was discharged from [place] [address] in stable condition at [phone].  She is to return on next Friday, 3-19-21  at 1100, for her next appointment, for Cycle 4, Day 8, IV Gemzar Chemotherapy Infusion.   
 
Pati Hickman RN 
March 12, 2021 
11:58 AM

## 2021-03-19 NOTE — PROGRESS NOTES
[facility] Progress Note Date: 2021 Name: Yenifer Maldonado MRN: [id number] : 1955 Ms. Barbara Rondon arrived in the [facility] today at [phone], in stable condition, here for Cycle 4, Day 8, IV GEMZAR Chemotherapy Infusion (Days 1 & 8, of Every 21 Day Cycle). She was assessed and education was provided. Ms. Arjun Tatum vitals were reviewed. Visit Vitals /66 (BP 1 Location: Right upper arm, BP Patient Position: Sitting) Pulse (!) 105 Temp 98.3 °F (36.8 °C) Resp 24 Ht 5' 5\" (1.651 m) Wt 87.4 kg (192 lb 9.6 oz) SpO2 100% BMI 32.05 kg/m² Her right chest single lumen port was accessed without incident at 1130, and brisk blood return was obtained. Blood for the ordered CBC was drawn, and was processed on site. The CBC results from today were as follows: The critically elevated Platelet Count listed below of 802, as well as the low HGB & HCT results listed below, were reported to Dr. Micah Lew at 1200. Order was received from Dr. Reino Apley, to proceed with chemotherapy today as planned, but schedule her to receive 1 unit of PRBC ASAP. Recent Results (from the past 12 hour(s)) CBC WITH 3 PART DIFF Collection Time: 21 11:30 AM  
Result Value Ref Range WBC 4.7 4.5 - 13.0 K/uL  
 RBC 2.26 (L) 4.10 - 5.10 M/uL HGB 7.7 (L) 12.0 - 16 g/dL HCT 22.6 (L) 36 - 48 % .0 78 - 102 FL  
 MCH 34.1 25.0 - 35.0 PG  
 MCHC 34.1 31 - 37 g/dL RDW 21.8 (H) 11.5 - 14.5 % PLATELET 394 (HH) 087 - 440 K/uL NEUTROPHILS 31 (L) 40 - 70 % MIXED CELLS 31 (H) 0.1 - 17 % LYMPHOCYTES 38 14 - 44 % ABS. NEUTROPHILS 1.4 (L) 1.8 - 9.5 K/UL  
 ABS. MIXED CELLS 1.5 0.0 - 2.3 K/uL  
 ABS. LYMPHOCYTES 1.8 1.1 - 5.9 K/UL  
 DF AUTOMATED Blood for the ordered Type & Crossmatch (3 Pink Top Tubes & 1 Lavender Top Tube) was drawn from her port at 1215 without incident, and was sent by  to the [facility] hospital lab for processing.  The blood bank transfusion armband was securely applied to her left wrist, and Ms. Vu Reich was instructed to NOT get the armband wet, or remove the armband until after the completion of her blood transfusion on Monday, and she verbalized understanding. Written Blood transfusion consent was obtained and scanned into her electronic record, along with the written transfusion orders. 250 ml IV Bag NS was initiated to infuse @ KVO PRN throughout treatment today. The following pre-medication was administered per order and without incident: Zofran 8 mg IV. The following chemotherapy medication was administered as follows: Gemcitabine (GEMZAR) 2,400 mg IV (1,200 mg/m2), was administered over approximately 90 minutes, per order, and without incident. After completion of all ordered IV medications, her port was flushed very well per protocol with NS & Heparin, and then the heath needle was removed and gauze/bandaid was applied. Neulasta 6 mg SQ On Body Injector was applied to the back of her left arm at 1520, per order and without incident. And, after application, the green light was noted to be flashing appropriately. Ms. Vu Reich tolerated her treatment very well today, and voiced no complaints. Ms. Vu Reich was discharged from [place] [address] in stable condition at 1530. Johana Renteria She is to return on Monday, 3-22-21  at 1000, for her next appointment, to receive 1 Unit of PRBC. And then, she is scheduled to return on Tuesday, 3-30-21 at [place] [address] for pre-chemo labs. And then cycle 5, day 1, Gemzar chemotherapy is scheduled for Friday, 4-2-21 at 1100. Johana Gonzalez RN 
March 19, 2021 
11:58 AM

## 2021-03-19 NOTE — PROGRESS NOTES
Hematology/Oncology Note Name: Mireya Rodriguez Date: 3/24/2021 : 1955 Serafin Lizarraga MD  
 
 
Oncology History: Ms. Mary Mitchell  is a 77 y.o. -American woman who has follow-up for her left gluteal mass. -- In 2019 she had an ultrasound of the area and there was no evidence of a cystic component the area was described as heterogeneous measuring about 6.8 x 4.85 8.5 cm. An attempt to aspirate fluid from the left posterior hip/gluteal mass was unsuccessful. More recently she states that the mass has actually doubled in size and is now about the size of a softball.  
-- 2019. Liver, needle biopsy: mild to moderate chronic hepatitis (NOVA and MATI score 2-3) with bridging fibrosis (nova and mati score 3); see comment. -- 2019. Lumbar mass, core biopsies: Soft tissue tumor with features consistent with sarcoma. 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 Necrosis: present. Histologic grade (fnclcc): grade 3. 
-- 2020: CT C/A/P: Significant interval increase in size of the mass within the posterior left buttock which now measures 14.1 cm, compared to 5.1 cm on prior examination. A new indeterminant moderate compression fracture is present in the T5 vertebral body. 
-- S.p Neoadjuvant XRT. (Bonif[facility] Bautista) -- 2020 Left gluteal sarcoma resection -- Her recent CT at [facility] reported metastatic lung lesions. She was seen at [facility] recently and suggested palliative chemotherapy. The patient would like to have therapy at [facility] closer to her house. -- 2020 s.p Port placement. -- 12/10/2020 CT of chest/A/P: Large left gluteal mass present on prior exam is no longer present. Residual soft tissue and skin changes likely represent scarring. Innumerable new bilateral pulmonary nodules consistent with metastatic disease. There may also be pleural metastasis along the right major fissure inferiorly. Single borderline abnormal left periaortic lymph node unchanged. No new abnormal adenopathy. Stable T5 compression fracture. No new bony abnormalities. -- The patient would like to start her chemotherapy after holidays. -- NGS tumor profiling, liquid biopsy obtained. -- 1/8/2021 C1 D1 Gemcitabine  
-- 1/15/2021 C1 D8 Gemcitabine 
-- 1/29/2021 C2 D1 Gemcitabine 
-- 2/5/2021 C2 D8 Gemcitabine was held d/t low ANC 
-- 2/19/2021 C3 D1 Subjective: Chief complaint: Gluteal mass, sarcoma History of present illness: Ms. Emelyn Mejía is a 67-year-old -American woman who states that about a year ago she noticed a small masslike lesion over her left gluteal area. She did not think much of it. However over the past few months it has started to enlarge in size. She had mass biopsied which pathology reported sarcoma,undifferentiated pleomorphic. She had completed neoadjuvant radiation therapy. Subsequently she underwent resection on 6/11/2020 by Dr. Shelley Marino. Her CT at [facility] reported metastatic lung lesions. She was seen at [facility] and suggested palliative chemotherapy. The patient would like to have therapy at [facility] closer to her house. Today she presented here for follow up with chemotherapy. She has started  C1D1 Gemcitabine on 1/8/2021. She was accompanied by her family today. She reported some fatigue and decreasing appetite after each therapy but improved to her baseline afterwards. She reported her breathing doing stable. She has chronic SOB from long-standing COPD. She is oxygen dependent. The patient otherwise has no other complaints. Denied fever, chills, night sweat, unintentional weight loss, skin lumps or bumps, acute bleeding or bruising issues. Denied headache, acute vision change, dizziness, chest pain, palpitation, productive cough, nausea, vomiting, abdominal pain, altered bowel habits, dysuria, new bone pain or back pain, focal numbness or weakness. Past Medical History:  
Diagnosis Date  Acid reflux  Anxiety  Bipolar [facility] disorder ([facility]  Bronchitis  Cirrhosis ([facility]  Coughing  Depression  DVT (deep vein thrombosis) in pregnancy  ETOH abuse  Gastritis  Hepatic encephalopathy ([facility]  Hepatitis  Joint pain  Leukocytosis  Lumbar disc disease  Shortness of breath  Stress  Teeth decayed  Vitamin D deficiency  Weakness of left leg Allergies Allergen Reactions  Aspirin Nausea and Vomiting Past Surgical History:  
Procedure Laterality Date  EGD  HAND/FINGER SURGERY UNLISTED  HX BACK SURGERY    
 HX OVARIAN CYST REMOVAL    
 HX SPLENECTOMY  IR INSERT TUNL CVC W PORT OVER 5 YEARS  12/8/2020 Social History Socioeconomic History  Marital status:  Spouse name: Not on file  Number of children: Not on file  Years of education: Not on file  Highest education level: Not on file Occupational History  Not on file Social Needs  Financial resource strain: Not on file  Food insecurity Worry: Not on file Inability: Not on file  Transportation needs Medical: Not on file Non-medical: Not on file Tobacco Use  Smoking status: Current Every Day Smoker  Smokeless tobacco: Never Used Substance and Sexual Activity  Alcohol use: Not Currently  Drug use: Yes Types: Cocaine, Heroin  Sexual activity: Yes Lifestyle  Physical activity Days per week: Not on file Minutes per session: Not on file  Stress: Not on file Relationships  Social connections Talks on phone: Not on file Gets together: Not on file Attends [employer] service: Not on file Active member of club or organization: Not on file Attends meetings of clubs or organizations: Not on file Relationship status: Not on file  Intimate partner violence Fear of current or ex partner: Not on file Emotionally abused: Not on file Physically abused: Not on file Forced sexual activity: Not on file Other Topics Concern  Not on file Social History Narrative  Not on file Family History Problem Relation Age of Onset  Heart Disease Mother  Stroke Mother  Heart Disease Father  Heart Disease Brother  Hypertension Brother Current Outpatient Medications Medication Sig Dispense Refill  lidocaine-prilocaine (EMLA) topical cream Apply  to affected area as needed (30-60 minutes before port is to be accessed). 30 g 0  
 cyclobenzaprine (FLEXERIL) 5 mg tablet  furosemide (LASIX) 40 mg tablet  mirtazapine (REMERON) 15 mg tablet TAKE 1 TABLET BY MOUTH EVERYDAY AT BEDTIME  silver sulfADIAZINE (SILVADENE) 1 % topical cream APPLY TO AFFECTED AREA 3 TIMES A DAY  diclofenac (VOLTAREN) 1 % gel APPLY 4 GM TO LEFT THIGH/BUTTOCKS 4 TIMES A DAY AS NEEDED FOR PAIN    
 ondansetron hcl (ZOFRAN) 4 mg tablet  QUEtiapine (SEROquel) 50 mg tablet TAKE 1 TABLET BY MOUTH AT BEDTIME  ARIPiprazole (ABILIFY) 5 mg tablet  esomeprazole (NEXIUM) 20 mg capsule TAKE 20 MG BY MOUTH ONCE A DAY.  ADVAIR -65 mcg/actuation inhaler  hydrOXYzine HCl (ATARAX) 25 mg tablet  albuterol-ipratropium (DUO-NEB) 2.5 mg-0.5 mg/3 ml nebu PLEASE SEE ATTACHED FOR DETAILED DIRECTIONS  6  
 DAILY-GIORGIO tablet TAKE 1 TABLET BY MOUTH EVERY DAY    
 OLANZapine (ZYPREXA) 10 mg tablet  predniSONE (DELTASONE) 10 mg tablet Take 10 mg by mouth.  albuterol (PROVENTIL HFA, VENTOLIN HFA, PROAIR HFA) 90 mcg/actuation inhaler Take 2 Puffs by inhalation.  buprenorphine-naloxone (SUBOXONE) 8-2 mg film sublingaul film 1 Each by SubLINGual route.  SUBOXONE 8-2 mg film sublingaul film DISSOLVE 1 FILM UNDER THE TONGUE TWICE DAILY  0  
 buPROPion XL (WELLBUTRIN XL) 150 mg tablet TAKE 1 TABLET BY MOUTH EVERY DAY  3  
 busPIRone (BUSPAR) 7.5 mg tablet Take 7.5 mg by mouth.     
 ergocalciferol (ERGOCALCIFEROL) 50,000 unit capsule TAKE 1 CAP BY MOUTH EVERY FRIDAY. 1  
 folic acid (FOLVITE) 1 mg tablet Take  by mouth daily.  hydrOXYzine pamoate (VISTARIL) 25 mg capsule Take 25 mg by mouth three (3) times daily as needed for Itching.  albuterol 2.5 mg /3 mL (0.083 %) nebu 3 mL, albuterol-ipratropium 2.5 mg-0.5 mg/3 ml nebu 3 mL 1 Dose by Nebulization route.  lactulose (KRISTALOSE) 10 gram packet Take 10 g by mouth three (3) times daily.  montelukast (SINGULAIR) 10 mg tablet Take 10 mg by mouth daily.  naproxen (NAPROSYN) 500 mg tablet Take 500 mg by mouth two (2) times daily (with meals).  omeprazole (PRILOSEC) 40 mg capsule Take 40 mg by mouth daily.  spironolactone (ALDACTONE) 25 mg tablet Take  by mouth daily.  thiamine HCL (VITAMIN B-1) 100 mg tablet Take  by mouth daily.  tiotropium (SPIRIVA WITH HANDIHALER) 18 mcg inhalation capsule Take 1 Cap by inhalation daily. Facility-Administered Medications Ordered in Other Visits Medication Dose Route Frequency Provider Last Rate Last Admin  
 0.9% sodium chloride infusion 250 mL  250 mL IntraVENous PRN Chi Martin MD   Stopped at 03/22/21 1355  sodium chloride (NS) flush 10-40 mL  10-40 mL IntraVENous PRN Chi Martin MD   20 mL at 03/22/21 1415 Review of Systems Constitutional: Positive for malaise/fatigue. Negative for chills, diaphoresis, fever and weight loss. Respiratory: Positive for shortness of breath (chronic SOB). Negative for cough, hemoptysis and wheezing. Cardiovascular: Negative for chest pain, palpitations and leg swelling. Gastrointestinal: Negative for abdominal pain, diarrhea, heartburn, nausea and vomiting. Genitourinary: Negative for dysuria, frequency, hematuria and urgency. Musculoskeletal: Negative for joint pain and myalgias. Skin: Negative for itching and rash. Neurological: Negative for dizziness, seizures, weakness and headaches. Psychiatric/Behavioral: Negative for depression.  The patient does not have insomnia. Objective:  
 
Visit Vitals /63 (BP Patient Position: Sitting) Pulse (!) 106 Temp 98.1 °F (36.7 °C) (Skin) Resp 22 Wt 88.5 kg (195 lb) SpO2 97% BMI 32.45 kg/m² ECOG Performance Status (grade): 2 
0 - able to carry on all pre-disease activity w/out restriction 1 - restricted but able to carry out light work 2 - ambulatory and can self- care but unable to carry out work 3 - bed or chair >50% of waking hours 4 - completely disable, total care, confined to bed or chair Physical Exam 
Constitutional:   
   Appearance: Normal appearance. Comments: On chronic oxygen HENT:  
   Head: Normocephalic and atraumatic. Eyes:  
   Pupils: Pupils are equal, round, and reactive to light. Neck: Musculoskeletal: Neck supple. Cardiovascular:  
   Rate and Rhythm: Normal rate and regular rhythm. Heart sounds: Normal heart sounds. Pulmonary:  
   Effort: Pulmonary effort is normal.  
   Breath sounds: Normal breath sounds. Abdominal:  
   General: Bowel sounds are normal.  
   Palpations: Abdomen is soft. Tenderness: There is no abdominal tenderness. There is no guarding. Musculoskeletal: Normal range of motion. Right lower leg: No edema. Left lower leg: No edema. Comments: Left gluteal wound: clean, healing well, no redness or infected. Skin: 
   General: Skin is warm. Neurological:  
   General: No focal deficit present. Mental Status: She is alert and oriented to person, place, and time. Mental status is at baseline. Diagnostics: No results found for this or any previous visit (from the past 96 hour(s)). Imaging: 
Results for orders placed in visit on 10/27/20 IR INSERT TUNL CVC W PORT OVER 5 YEARS No results found for this or any previous visit. Results for orders placed in visit on 11/25/20 CT CHEST ABD PELV W CONT Assessment: # Soft tissue mass, left gluteal area:  
# Undifferentiated pleomorphic sarcoma # Lung metastases # Bone pain, Neulasta-related Plan: # Undifferentiated pleomorphic sarcoma # Lung metastases # Soft tissue mass, left gluteal area:  
-- In February 2019 she had an ultrasound of the area and there was no evidence of a cystic component the area was described as heterogeneous measuring about 6.8 x 4.85 8.5 cm. An attempt to aspirate fluid from the left posterior hip/gluteal mass was unsuccessful. More recently she states that the mass has actually doubled in size and is now about the size of a softball.  
-- 4/5/2019. Liver, needle biopsy: mild to moderate chronic hepatitis (NOVA and MATI score 2-3) with bridging fibrosis (nova and mati score 3); see comment. -- 11/20/2019. Lumbar mass, core biopsies: Soft tissue tumor with features consistent with sarcoma. 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 Necrosis: present. Histologic grade (fnclcc): grade 3. 
-- 1/31/2020: CT C/A/P: Significant interval increase in size of the mass within the posterior left buttock which now measures 14.1 cm, compared to 5.1 cm on prior examination. A new indeterminant moderate compression fracture is present in the T5 vertebral body. Mild scarring is present in the lingula. -- S.p Neoadjuvant XRT. (Edel Davidson) -- 6/11/2020 Left gluteal sarcoma resection - Dr. Marjorie Parks at [facility] orthopedic oncology department. Preliminary report faxed to us which stated pleomorphic sarcoma s/p resection. She was planned to obtain chest CT in 2 months for interval assessment of lung lesion. -- Her recent CT at [facility] reported metastatic lung lesions. She was seen at [facility] recently and suggested palliative chemotherapy. The patient would like to have therapy at [facility] closer to her house. -- 12/08/2020 s.p Port placement. -- 12/10/2020 CT of chest/A/P: Large left gluteal mass present on prior exam is no longer present.  Residual soft tissue and skin changes likely represent scarring. Innumerable new bilateral pulmonary nodules consistent with metastatic disease. There may also be pleural metastasis along the right major fissure inferiorly. Single borderline abnormal left periaortic lymph node unchanged. No new abnormal adenopathy. Stable T5 compression fracture. No new bony abnormalities. -- The patient would like to start her chemotherapy after holidays. -- NGS tumor profiling, liquid biopsy obtained. -- 1/8/2021 C1 D1 Gemcitabine  
-- 1/15/2021 C1 D8 Gemcitabine 
-- 1/29/2021 C2 D1 Gemcitabine 
-- 2/5/2021 C2 D8 Gemcitabine was held d/t low ANC 
-- 2/19/2021 C3 D1 
-- 2/26/2021 C3 D8 
-- 3/12/2021 C4 D1 
-- 3/19/2021 C4 D8 
-- She has tolerated chemotherapy fairly well with no high graded toxicities. Clinical stable, breathing reported improving. Recent labs reviewed. Plan: 
-- We will continue single agent Gemcitabine, C5 D1 on 4/2/2021 tentatively -- Monitor labs: CBC, CMP 
-- We will obtain interval imaging CT C/A/P.  
-- We will see the patient back to the clinic in 4 weeks. Always sooner if required. Single agent Gemcitabine:  
Days 1 and 8: Gemcitabine 1,200mg/m2 IV over 90 to 120 minutes. Repeat cycle every 3 weeks. Growth factor support. # Chronic Anemia # Anemia, chemotherapy-related -- She has received PRBCs supportively. -- Will monitor  iron profile, ferritin, b12/folate and replacement accordingly # Poor appetite 
-- Ensure BID 
-- Advised to keep hydrated. # Generalized bone pain, likely Neulasta related 
-- Advised to take antihistamines such as Claritin or Benadryl or NSAIDs Orders Placed This Encounter  CT CHEST ABD PELV W WO CONT Standing Status:   Future Standing Expiration Date:   4/26/2022 Order Specific Question:   STAT Creatinine as indicated Answer:   Yes Order Specific Question: This order utilizes IV contrast.  What additional contrast is needed?   
  Answer:   Per Radiologist Protocol  
• IRON PROFILE  
  Standing Status:   Future  
  Number of Occurrences:   1  
  Standing Expiration Date:   3/25/2022  
• FERRITIN  
  Standing Status:   Future  
  Number of Occurrences:   1  
  Standing Expiration Date:   3/24/2022  
• VITAMIN B12 & FOLATE  
  Standing Status:   Future  
  Number of Occurrences:   1  
  Standing Expiration Date:   3/24/2022  
• RETICULOCYTE COUNT  
  Standing Status:   Future  
  Number of Occurrences:   1  
  Standing Expiration Date:   3/24/2022  
• ERYTHROPOIETIN  
  Standing Status:   Future  
  Number of Occurrences:   1  
  Standing Expiration Date:   3/24/2022  
 
 
 
 
Ms. Bolton has a reminder for a \"due or due soon\" health maintenance. I have asked that she contact her primary care provider for follow-up on this health maintenance.  
All of patient's questions answered to their apparent satisfaction. They verbally show understanding and agreement with aforementioned plan.  
 
 
 
Josue MEJIA Do, MD 
3/24/2021 
 
 
 
 
About 25 minutes were spent for this encounter with more than 50% of the time spent in face-to-face counseling, discussing on diagnosis and management plan going forward, and co-ordination of care. 
Parts of this document has been produced using Dragon dictation system. Unrecognized errors in transcription may be present. Please do not hesitate to reach out for any questions or clarifications. 
 
 
CC: Coleen Lizarraga MD

## 2021-03-22 NOTE — PROGRESS NOTES
[facility] Progress Note Date: 2021 Name: Gwendolyn Victoria MRN: [id number] : 1955 Ms. Moose Petty arrived to [facility] at [phone]. Ms. Moose Petty was assessed and education was provided. Pt has had blood transfusion at the hospital before with no reactions noted. Ms. Hue Doty vitals were reviewed. Visit Vitals BP 96/61 (BP 1 Location: Left upper arm, BP Patient Position: Sitting) Pulse (!) 115 Temp 98 °F (36.7 °C) Resp 18 SpO2 100% Her right chest single lumen port was accessed without incident at 1037, and brisk blood return was obtained Normal saline initiated at [facility] per order. Pre-medications were administered as ordered. 1 unitof PRBCs administered as ordered followed by normal saline flush. Ms. Moose Petty tolerated transfusion without complaints. Patient Vitals for the past 8 hrs: 
 Temp Pulse Resp BP SpO2  
21 1415 98.5 °F (36.9 °C) 99 16 (!) 89/60 97 % 21 1332 98.1 °F (36.7 °C) (!) 102 16 94/61 98 % 21 1310 98.1 °F (36.7 °C) (!) 103 16 (!) 89/57 99 % 21 1210 98 °F (36.7 °C) (!) 107 16 98/66 100 % 21 1140 98 °F (36.7 °C) (!) 108 16 97/64 100 % 21 1125 98 °F (36.7 °C) (!) 109 16 99/65 99 % 21 1110 98 °F (36.7 °C) (!) 108 18 (!) 92/58 96 % 21 1105 98 °F (36.7 °C) (!) 109 18 (!) 89/58 100 % 21 1026 98 °F (36.7 °C) (!) 115 18 96/61 100 % Instructed pt to report SOB, CP, elevated temp, back pain, or other symptoms of transfusion reaction to MD or ED. Pt verbalized understanding. Port was flushed very well per protocol with NS & Heparin, and then the heath needle was removed and gauze/bandaid was applied. Pt armbands removed/ shredded. Ms. Moose Petty was discharged from [place] [address] in stable condition at [phone]. She is to return on 3/30/21 at [phone] for her next appointment. Michaela aBrnhart RN 
2021

## 2021-03-30 NOTE — PROGRESS NOTES
[facility] Progress Note Date: 2021 Name: Julia Martin MRN: [id number] : 1955 Peripheral Lab Draw Ms. Carol Oates to [facility], ambulatory at [phone] accompanied by self. Pt was assessed and education was provided. Ms. Marisela Scott vitals were reviewed and patient was observed for 5 minutes prior to treatment. Visit Vitals /63 (BP 1 Location: Left arm, BP Patient Position: Sitting) Pulse 96 Temp 99.2 °F (37.3 °C) Ht 5' 5\" (1.651 m) Wt 86.4 kg (190 lb 6.4 oz) SpO2 98% BMI 31.68 kg/m² No results found for this or any previous visit (from the past 12 hour(s)). Blood obtained peripherally from LT Forearm 2nd attempt with butterfly needle and sent to lab for CBC w/ Diff and CMP per written orders. No bleeding or hematoma noted at site. Gauze and coban applied. Ms. Carol Oates tolerated the venipuncture, and had no complaints. Patient armband removed and shredded. Ms. Carol Oates was discharged from [place] [address] in stable condition at 1520. Siddharth Randall Phlebotomist PCT 2021 
3:43 PM

## 2021-04-02 NOTE — PROGRESS NOTES
[facility] Progress Note Date: 2021 Name: Jamar Belle MRN: [id number] : 1955 GEMZAR- D1, D8- Q21 DAYS 
(CYCLE 5, D1 TODAY) Ms. Krista Joaquin arrived to [facility] at 1150. Ms Krista Joaquin arrived via wheelchair with portable home oxygen concentrator at 3L/min nasal cannula. Pt was assessed and education was provided. Ms. Elizabeth Armenta vitals were reviewed. Visit Vitals /66 (BP 1 Location: Left upper arm, BP Patient Position: Sitting) Pulse 96 Temp 98.4 °F (36.9 °C) Resp 20 SpO2 96% Breastfeeding No  
 
 
Lab results from 3/3021 were obtained and reviewed and are within tx plan parameters to proceed w/ tx today. WBC 23.4 and ANC 18.9 Dr. Tim Ramos aware as patient is on neulasta. Results for Sadie Arnett (MRN [id number]) Ref. Range 3/30/2021 15:33 WBC Latest Ref Range: 4.6 - 13.2 K/uL 23.4 (H) RBC Latest Ref Range: 4.20 - 5.30 M/uL 2.80 (L) HGB Latest Ref Range: 12.0 - 16.0 g/dL 9.2 (L) HCT Latest Ref Range: 35.0 - 45.0 % 28.7 (L) MCV Latest Ref Range: 74.0 - 97.0 .5 (H) MCH Latest Ref Range: 24.0 - 34.0 PG 32.9 MCHC Latest Ref Range: 31.0 - 37.0 g/dL 32.1 RDW Latest Ref Range: 11.6 - 14.5 % 24.2 (H) PLATELET Latest Ref Range: 135 - 420 K/uL 187 MPV Latest Ref Range: 9.2 - 11.8 FL 9.5 NEUTROPHILS Latest Ref Range: 40 - 73 % 81 (H) LYMPHOCYTES Latest Ref Range: 21 - 52 % 9 (L) MONOCYTES Latest Ref Range: 3 - 10 % 8 EOSINOPHILS Latest Ref Range: 0 - 5 % 2  
BASOPHILS Latest Ref Range: 0 - 2 % 0  
DF Latest Units:   AUTOMATED  
ABS. NEUTROPHILS Latest Ref Range: 1.8 - 8.0 K/UL 18.9 (H)  
ABS. LYMPHOCYTES Latest Ref Range: 0.9 - 3.6 K/UL 2.1 ABS. MONOCYTES Latest Ref Range: 0.05 - 1.2 K/UL 1.9 (H)  
ABS. EOSINOPHILS Latest Ref Range: 0.0 - 0.4 K/UL 0.5 (H)  
ABS. BASOPHILS Latest Ref Range: 0.0 - 0.1 K/UL 0.0  
RBC COMMENTS Latest Units:   SCHISTOCYTES. .. PLATELET COMMENTS Latest Units:   ADEQUATE PLATELETS Sodium Latest Ref Range: 136 - 145 mmol/L 133 (L) Potassium Latest Ref Range: 3.5 - 5.5 mmol/L 4.6 Chloride Latest Ref Range: 100 - 111 mmol/L 97 (L)  
CO2 Latest Ref Range: 21 - 32 mmol/L 36 (H) Anion gap Latest Ref Range: 3.0 - 18 mmol/L 0 (L) Glucose Latest Ref Range: 74 - 99 mg/dL 135 (H) BUN Latest Ref Range: 7.0 - 18 MG/DL 13 Creatinine Latest Ref Range: 0.6 - 1.3 MG/DL 1.01  
BUN/Creatinine ratio Latest Ref Range: 12 - 20   13 Calcium Latest Ref Range: 8.5 - 10.1 MG/DL 8.2 (L)  
GFR est non-AA Latest Ref Range: >60 ml/min/1.73m2 55 (L)  
GFR est AA Latest Ref Range: >60 ml/min/1.73m2 >60 Bilirubin, total Latest Ref Range: 0.2 - 1.0 MG/DL 0.8 Protein, total Latest Ref Range: 6.4 - 8.2 g/dL 8.3 (H) Albumin Latest Ref Range: 3.4 - 5.0 g/dL 2.7 (L) Globulin Latest Ref Range: 2.0 - 4.0 g/dL 5.6 (H) A-G Ratio Latest Ref Range: 0.8 - 1.7   0.5 (L) ALT Latest Ref Range: 13 - 56 U/L 19 AST Latest Ref Range: 10 - 38 U/L 20 Alk. phosphatase Latest Ref Range: 45 - 117 U/L 281 (H) Pt's R upper chest port accessed. Brisk blood return/ flushes without difficulty. NS infusing as ordered at [facility]. Pre-medication Zofran 8 mg IVP administered as ordered followed by NS flush. Gemzar 2400 mg IV administered as ordered followed by NS flush. Ms. Vidal Gomes tolerated well without complaints. Discharge/ follow-up instructions discussed w/ pt. Pt verbalized understanding. Patient armband removed and shredded. Ms. Vidal Gomes was discharged from [place] [address] in stable condition at [phone]. She is to return on 4/09/21 at 1100 for her next chemo infusion. Siri Jean April 02, 2021

## 2021-04-09 NOTE — PROGRESS NOTES
[facility] Progress Note    Date: 2021    Name: Fadia Samaniego    MRN: [id number]         : 1955    Ms. Kurt Jiménez arrived in the [facility] today at 1135, in stable condition, here for Cycle 5, Day 8, IV GEMZAR Chemotherapy Infusion (Days 1 & 8, of Every 21 Day Cycle). She was assessed and education was provided. Ms. Jerzy Dash vitals were reviewed. Visit Vitals  BP (!) 113/58 (BP 1 Location: Right upper arm, BP Patient Position: Sitting)   Pulse 88   Temp 97.9 °F (36.6 °C)   Resp 20   Ht 5' 5\" (1.651 m)   Wt 87.6 kg (193 lb 3.2 oz)   SpO2 99%   BMI 32.15 kg/m²         Her right chest single lumen port was accessed without incident at 1145, and brisk blood return was obtained. Blood for the ordered CBC was drawn, and was processed on site. The Preliminary CBC results from today were as follows: The critically elevated Preliminary Platelet Count listed below of 937, was reported to Ernesto Zavaleta NP at 1200. No new orders were received. WBC 3.7  ANC 1.4  HGB 8.4  HCT 25.0           250 ml IV Bag NS was initiated to infuse @ KVO PRN throughout treatment today. The following pre-medication was administered per order and without incident: Zofran 8 mg IV. The following chemotherapy medication was administered as follows: Gemcitabine (GEMZAR) 2,400 mg IV (1,200 mg/m2), was administered over approximately 90 minutes, per order, and without incident. After completion of all ordered IV medications, her port was flushed very well per protocol with NS & Heparin, and then the heath needle was removed and gauze/bandaid was applied. Neulasta 6 mg SQ On Body Injector was applied to the back of her left arm at 1500, per order and without incident. And, after application, the green light was noted to be flashing appropriately. Ms. Kurt Jiménez tolerated her treatment very well today, and voiced no complaints.      Ms. Kurt Jiménez was discharged from [place] [address] in stable condition at 1505. Nohemi Bassett She is scheduled to return on Tuesday, 4-20-21 at [phone] for pre-chemo labs (Also OV with Dr. Ana Reich at 1400). And then cycle 6, day 1, Gemzar chemotherapy is scheduled for Friday, 4-23-21 at 1100. Romain Lu RN  April 9, 2021  11:58 AM

## 2021-04-20 NOTE — PROGRESS NOTES
[facility] Progress Note Date: 2021 Name: Robert Goodwin MRN: [id number] : 1955 Peripheral Lab Draw Ms. Bolton to [address], ambulatory at 1450 accompanied by self. Pt was assessed and education was provided. Ms. Daniel Ibarra vitals were reviewed and patient was observed for 5 minutes prior to treatment. Visit Vitals /67 (BP 1 Location: Right arm, BP Patient Position: Sitting) Pulse 94 Temp 99 °F (37.2 °C) Resp 16 Ht 5' 5\" (1.651 m) Wt 86.5 kg (190 lb 12.8 oz) SpO2 97% BMI 31.75 kg/m² No results found for this or any previous visit (from the past 12 hour(s)). Blood obtained peripherally from [facility] first attempt with butterfly needle and sent to lab for CBC w/ Diff and CMP per written orders. No bleeding or hematoma noted at site. Gauze and coban applied. Ms. Jose Batres tolerated the phlebotomy, and had no complaints. Patient armband removed and shredded. Ms. Jose Batres was discharged from [place] [address] in stable condition at 1500. Ольга Baptiste Phlebotomist PCT 2021 
3:33 PM

## 2021-04-23 NOTE — PROGRESS NOTES
[facility] Progress Note Date: 2021 Name: Tracee Haas MRN: [id number] : 1955 Chemotherapy Cycle: C6 D1. . Ms Braydon Childs arrived via wheelchair at 1125 am, with O2 at 3L/min nasal cannula. Pain to right leg 4/10. She took pain medication prior discharge. No acute distress noted. Positioned for comfort and reassured. Ms. Braydon Childs was assessed and education was provided. She declined printed lexico notes on general information and discharge instructions on gemzar. States she is currently on gemzar, tolerates well, and understands treatment plan. Ms. Rolan Peña vitals were reviewed and patient was observed for 5 minutes prior to treatment. Visit Vitals /63 (BP 1 Location: Right upper arm, BP Patient Position: At rest) Pulse 87 Temp 98.8 °F (37.1 °C) Resp 18 SpO2 95% Madeline Rout NP aware of WBC of 21.1. Proceed with chemo per [facility] [place]. Labs and weight okay for chemotherapy Results for Diane Adan (MRN [id number]) Ref. Range 2021 15:00 WBC Latest Ref Range: 4.6 - 13.2 K/uL 21.1 (H) RBC Latest Ref Range: 4.20 - 5.30 M/uL 2.53 (L) HGB Latest Ref Range: 12.0 - 16.0 g/dL 8.8 (L) HCT Latest Ref Range: 35.0 - 45.0 % 27.6 (L) MCV Latest Ref Range: 74.0 - 97.0 .1 (H) MCH Latest Ref Range: 24.0 - 34.0 PG 34.8 (H) MCHC Latest Ref Range: 31.0 - 37.0 g/dL 31.9 RDW Latest Ref Range: 11.6 - 14.5 % 22.7 (H) PLATELET Latest Ref Range: 135 - 420 K/uL 175 MPV Latest Ref Range: 9.2 - 11.8 FL 9.7 NEUTROPHILS Latest Ref Range: 40 - 73 % 89 (H) LYMPHOCYTES Latest Ref Range: 21 - 52 % 3 (L) MONOCYTES Latest Ref Range: 3 - 10 % 7 EOSINOPHILS Latest Ref Range: 0 - 5 % 1  
BASOPHILS Latest Ref Range: 0 - 2 % 0  
DF Latest Units:   MANUAL  
ABS. NEUTROPHILS Latest Ref Range: 1.8 - 8.0 K/UL 18.8 (H)  
ABS. LYMPHOCYTES Latest Ref Range: 0.9 - 3.6 K/UL 0.6 (L)  
ABS.  MONOCYTES Latest Ref Range: 0.05 - 1.2 K/UL 1.5 (H) ABS. EOSINOPHILS Latest Ref Range: 0.0 - 0.4 K/UL 0.2  
ABS. BASOPHILS Latest Ref Range: 0.0 - 0.1 K/UL 0.0  
RBC COMMENTS Latest Units:   POIKILOCYTOSIS. .. PLATELET COMMENTS Latest Units:   ADEQUATE PLATELETS Sodium Latest Ref Range: 136 - 145 mmol/L 138 Potassium Latest Ref Range: 3.5 - 5.5 mmol/L 3.8 Chloride Latest Ref Range: 100 - 111 mmol/L 101 CO2 Latest Ref Range: 21 - 32 mmol/L 35 (H) Anion gap Latest Ref Range: 3.0 - 18 mmol/L 2 (L) Glucose Latest Ref Range: 74 - 99 mg/dL 90 BUN Latest Ref Range: 7.0 - 18 MG/DL 6 (L) Creatinine Latest Ref Range: 0.6 - 1.3 MG/DL 0.84 BUN/Creatinine ratio Latest Ref Range: 12 - 20   7 (L) Calcium Latest Ref Range: 8.5 - 10.1 MG/DL 8.6 GFR est non-AA Latest Ref Range: >60 ml/min/1.73m2 >60  
GFR est AA Latest Ref Range: >60 ml/min/1.73m2 >60 Bilirubin, total Latest Ref Range: 0.2 - 1.0 MG/DL 0.4 Protein, total Latest Ref Range: 6.4 - 8.2 g/dL 8.1 Albumin Latest Ref Range: 3.4 - 5.0 g/dL 2.6 (L) Globulin Latest Ref Range: 2.0 - 4.0 g/dL 5.5 (H) A-G Ratio Latest Ref Range: 0.8 - 1.7   0.5 (L) ALT Latest Ref Range: 13 - 56 U/L 26 AST Latest Ref Range: 10 - 38 U/L 28 Alk. phosphatase Latest Ref Range: 45 - 117 U/L 282 (H) Right chest medi-port accessed wit # 20 gauge, 1' heath needle. Brisk flush/blood returns noted. N.S flush bag hung Pre-medication Zofran 8 mg IVP given via port after brisk blood return obtained. Gemzar 2,400 mg IV given over approx 90 min after brisk blood return obtained. Tolerated well. Medi-port flushed with N.S and HEPARIN per protocol, then heath needle removed. Site without redness, swelling, bleeding or tenderness. Bandaid applied. Patient Vitals for the past 8 hrs: 
 Temp Pulse Resp BP SpO2  
04/23/21 1400 98.8 °F (37.1 °C) 87 18 106/63 95 % 04/23/21 1126 98.2 °F (36.8 °C) 99 20 105/69 97 % Ms. Bolton tolerated infusion, and had no complaints.  
 
Verbally reviewed discharge instructions with patient. She stated understanding. Patient armband removed and shredded. Ms. Flaco Hanson was discharged from [place] [address] in stable condition at 1405. She is to return on 4/30/2021 at 1100 for chemotherapy. Lauren Loving RN April 23, 2021

## 2021-04-27 NOTE — PROGRESS NOTES
Hematology/Oncology Note    Name: Robert Goodwin  Date: 2021  : 1955    Kehinde Lacy MD       Oncology History:  Ms. Jose Batres  is a 77 y.o. African-American woman who has follow-up for her left gluteal mass. -- In 2019 she had an ultrasound of the area and there was no evidence of a cystic component the area was described as heterogeneous measuring about 6.8 x 4.85 8.5 cm. An attempt to aspirate fluid from the left posterior hip/gluteal mass was unsuccessful. More recently she states that the mass has actually doubled in size and is now about the size of a softball.   -- 2019. Liver, needle biopsy: mild to moderate chronic hepatitis (NOVA and MATI score 2-3) with bridging fibrosis (nova and mati score 3); see comment. -- 2019. Lumbar mass, core biopsies: Soft tissue tumor with features consistent with sarcoma. 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 Necrosis: present. Histologic grade (fnclcc): grade 3.  -- 2020: CT C/A/P: Significant interval increase in size of the mass within the posterior left buttock which now measures 14.1 cm, compared to 5.1 cm on prior examination. A new indeterminant moderate compression fracture is present in the T5 vertebral body.  -- S.p Neoadjuvant XRT. ([place])  -- 2020 Left gluteal sarcoma resection  -- Her recent CT at [facility] reported metastatic lung lesions. She was seen at [facility] recently and suggested palliative chemotherapy. The patient would like to have therapy at [facility] closer to her house. -- 2020 s.p Port placement. -- 12/10/2020 CT of chest/A/P: Large left gluteal mass present on prior exam is no longer present. Residual soft tissue and skin changes likely represent scarring. Innumerable new bilateral pulmonary nodules consistent with metastatic disease. There may also be pleural metastasis along the right major fissure inferiorly. Single borderline abnormal left periaortic lymph node unchanged. No new abnormal adenopathy. Stable T5 compression fracture. No new bony abnormalities. -- The patient would like to start her chemotherapy after holidays. -- NGS tumor profiling, liquid biopsy obtained. -- 1/8/2021 C1 D1 Gemcitabine   -- 1/15/2021 C1 D8 Gemcitabine  -- 1/29/2021 C2 D1 Gemcitabine  -- 2/5/2021 C2 D8 Gemcitabine was held d/t low ANC  -- 2/19/2021 C3 D1      Subjective:   Chief complaint: Gluteal mass, sarcoma    History of present illness:  Ms. Blanca Cool is a 61-year-old -American woman who states that about a year ago she noticed a small masslike lesion over her left gluteal area. She did not think much of it. However over the past few months it has started to enlarge in size. She had mass biopsied which pathology reported sarcoma,undifferentiated pleomorphic. She had completed neoadjuvant radiation therapy. Subsequently she underwent resection on 6/11/2020 by Dr. Loree Tellez. Her CT at [facility] reported metastatic lung lesions. She was seen at [facility] and suggested palliative chemotherapy. The patient would like to have therapy at [facility] closer to her house. She has chronic SOB from long-standing COPD. She is oxygen dependent. Today she presented here for follow up with chemotherapy. She has started  C1D1 Gemcitabine on 1/8/2021. She was accompanied by her family today. She reported some fatigue and decreasing appetite after each therapy but improved to her baseline afterwards. She reported her breathing was stable. She has noted bilateral leg swelling for past few weeks. She has no other complaints. Denied fever, chills, night sweat, unintentional weight loss, skin lumps or bumps, acute bleeding or bruising issues. Denied headache, acute vision change, dizziness, chest pain, palpitation, productive cough, nausea, vomiting, abdominal pain, altered bowel habits, dysuria, new bone pain or back pain, focal numbness or weakness.        Past Medical History:   Diagnosis Date  Acid reflux     Anxiety     Bipolar 1 disorder (HCC)     Bronchitis     Cirrhosis (HCC)     Coughing     Depression     DVT (deep vein thrombosis) in pregnancy     ETOH abuse     Gastritis     Hepatic encephalopathy (HCC)     Hepatitis     Joint pain     Leukocytosis     Lumbar disc disease     Shortness of breath     Stress     Teeth decayed     Vitamin D deficiency     Weakness of left leg        Allergies   Allergen Reactions    Aspirin Nausea and Vomiting       Past Surgical History:   Procedure Laterality Date    EGD      HAND/FINGER SURGERY UNLISTED      HX BACK SURGERY      HX OVARIAN CYST REMOVAL      HX SPLENECTOMY      IR INSERT TUNL CVC W PORT OVER 5 YEARS  12/8/2020       Social History     Socioeconomic History    Marital status:      Spouse name: Not on file    Number of children: Not on file    Years of education: Not on file    Highest education level: Not on file   Occupational History    Not on file   Social Needs    Financial resource strain: Not on file    Food insecurity     Worry: Not on file     Inability: Not on file    Transportation needs     Medical: Not on file     Non-medical: Not on file   Tobacco Use    Smoking status: Current Every Day Smoker    Smokeless tobacco: Never Used   Substance and Sexual Activity    Alcohol use: Not Currently    Drug use: Yes     Types: Cocaine, Heroin    Sexual activity: Yes   Lifestyle    Physical activity     Days per week: Not on file     Minutes per session: Not on file    Stress: Not on file   Relationships    Social connections     Talks on phone: Not on file     Gets together: Not on file     Attends [employer] service: Not on file     Active member of club or organization: Not on file     Attends meetings of clubs or organizations: Not on file     Relationship status: Not on file    Intimate partner violence     Fear of current or ex partner: Not on file     Emotionally abused: Not on file Physically abused: Not on file     Forced sexual activity: Not on file   Other Topics Concern    Not on file   Social History Narrative    Not on file       Family History   Problem Relation Age of Onset    Heart Disease Mother     Stroke Mother     Heart Disease Father     Heart Disease Brother     Hypertension Brother        Current Outpatient Medications   Medication Sig Dispense Refill    lidocaine-prilocaine (EMLA) topical cream Apply  to affected area as needed (30-60 minutes before port is to be accessed). 30 g 0    cyclobenzaprine (FLEXERIL) 5 mg tablet       furosemide (LASIX) 40 mg tablet       mirtazapine (REMERON) 15 mg tablet TAKE 1 TABLET BY MOUTH EVERYDAY AT BEDTIME      silver sulfADIAZINE (SILVADENE) 1 % topical cream APPLY TO AFFECTED AREA 3 TIMES A DAY      diclofenac (VOLTAREN) 1 % gel APPLY 4 GM TO LEFT THIGH/BUTTOCKS 4 TIMES A DAY AS NEEDED FOR PAIN      ondansetron hcl (ZOFRAN) 4 mg tablet       QUEtiapine (SEROquel) 50 mg tablet TAKE 1 TABLET BY MOUTH AT BEDTIME      ARIPiprazole (ABILIFY) 5 mg tablet       esomeprazole (NEXIUM) 20 mg capsule TAKE 20 MG BY MOUTH ONCE A DAY.  ADVAIR -21 mcg/actuation inhaler       hydrOXYzine HCl (ATARAX) 25 mg tablet       albuterol-ipratropium (DUO-NEB) 2.5 mg-0.5 mg/3 ml nebu PLEASE SEE ATTACHED FOR DETAILED DIRECTIONS  6    DAILY-GIORGIO tablet TAKE 1 TABLET BY MOUTH EVERY DAY      OLANZapine (ZYPREXA) 10 mg tablet       predniSONE (DELTASONE) 10 mg tablet Take 10 mg by mouth.  albuterol (PROVENTIL HFA, VENTOLIN HFA, PROAIR HFA) 90 mcg/actuation inhaler Take 2 Puffs by inhalation.  buprenorphine-naloxone (SUBOXONE) 8-2 mg film sublingaul film 1 Each by SubLINGual route.  SUBOXONE 8-2 mg film sublingaul film DISSOLVE 1 FILM UNDER THE TONGUE TWICE DAILY  0    buPROPion XL (WELLBUTRIN XL) 150 mg tablet TAKE 1 TABLET BY MOUTH EVERY DAY  3    busPIRone (BUSPAR) 7.5 mg tablet Take 7.5 mg by mouth.       ergocalciferol (ERGOCALCIFEROL) 50,000 unit capsule TAKE 1 CAP BY MOUTH EVERY FRIDAY. 1    folic acid (FOLVITE) 1 mg tablet Take  by mouth daily.  hydrOXYzine pamoate (VISTARIL) 25 mg capsule Take 25 mg by mouth three (3) times daily as needed for Itching.  albuterol 2.5 mg /3 mL (0.083 %) nebu 3 mL, albuterol-ipratropium 2.5 mg-0.5 mg/3 ml nebu 3 mL 1 Dose by Nebulization route.  lactulose (KRISTALOSE) 10 gram packet Take 10 g by mouth three (3) times daily.  montelukast (SINGULAIR) 10 mg tablet Take 10 mg by mouth daily.  naproxen (NAPROSYN) 500 mg tablet Take 500 mg by mouth two (2) times daily (with meals).  omeprazole (PRILOSEC) 40 mg capsule Take 40 mg by mouth daily.  spironolactone (ALDACTONE) 25 mg tablet Take  by mouth daily.  thiamine HCL (VITAMIN B-1) 100 mg tablet Take  by mouth daily.  tiotropium (SPIRIVA WITH HANDIHALER) 18 mcg inhalation capsule Take 1 Cap by inhalation daily. Review of Systems   Constitutional: Positive for malaise/fatigue. Negative for chills, diaphoresis, fever and weight loss. Respiratory: Positive for shortness of breath (chronic SOB). Negative for cough, hemoptysis and wheezing. Cardiovascular: Negative for chest pain, palpitations and leg swelling. Gastrointestinal: Negative for abdominal pain, diarrhea, heartburn, nausea and vomiting. Genitourinary: Negative for dysuria, frequency, hematuria and urgency. Musculoskeletal: Negative for joint pain and myalgias. Skin: Negative for itching and rash. Neurological: Negative for dizziness, seizures, weakness and headaches. Psychiatric/Behavioral: Negative for depression. The patient does not have insomnia.              Objective:     Visit Vitals  /64 (BP 1 Location: Right arm, BP Patient Position: Sitting, BP Cuff Size: Adult)   Pulse (!) 111   Temp 98.5 °F (36.9 °C) (Temporal)   Resp 18   Ht 5' 5\" (1.651 m)   SpO2 96%   BMI 31.75 kg/m²       ECOG Performance Status (grade): 2  0 - able to carry on all pre-disease activity w/out restriction  1 - restricted but able to carry out light work  2 - ambulatory and can self- care but unable to carry out work  3 - bed or chair >50% of waking hours  4 - completely disable, total care, confined to bed or chair    Physical Exam  Constitutional:       Appearance: Normal appearance. Comments: On chronic oxygen   HENT:      Head: Normocephalic and atraumatic. Eyes:      Pupils: Pupils are equal, round, and reactive to light. Neck:      Musculoskeletal: Neck supple. Cardiovascular:      Rate and Rhythm: Normal rate and regular rhythm. Heart sounds: Normal heart sounds. Pulmonary:      Effort: Pulmonary effort is normal.      Breath sounds: Normal breath sounds. Abdominal:      General: Bowel sounds are normal.      Palpations: Abdomen is soft. Tenderness: There is no abdominal tenderness. There is no guarding. Musculoskeletal: Normal range of motion. Right lower leg: Edema present. Left lower leg: Edema present. Comments: Left gluteal wound: clean, healing well, no redness or infected. Skin:     General: Skin is warm. Neurological:      General: No focal deficit present. Mental Status: She is alert and oriented to person, place, and time. Mental status is at baseline. Diagnostics:      No results found for this or any previous visit (from the past 96 hour(s)). Imaging:  Results for orders placed in visit on 10/27/20   IR INSERT TUNL CVC W PORT OVER 5 YEARS       No results found for this or any previous visit.     Results for orders placed in visit on 11/25/20   CT CHEST ABD PELV W CONT                Assessment:   # Soft tissue mass, left gluteal area:   # Undifferentiated pleomorphic sarcoma  # Lung metastases  # Bone pain, Neulasta-related    Plan:   # Undifferentiated pleomorphic sarcoma  # Lung metastases  # Soft tissue mass, left gluteal area:   -- In February 2019 she had an ultrasound of the area and there was no evidence of a cystic component the area was described as heterogeneous measuring about 6.8 x 4.85 8.5 cm. An attempt to aspirate fluid from the left posterior hip/gluteal mass was unsuccessful. More recently she states that the mass has actually doubled in size and is now about the size of a softball.   -- 4/5/2019. Liver, needle biopsy: mild to moderate chronic hepatitis (NOVA and MATI score 2-3) with bridging fibrosis (nova and mati score 3); see comment. -- 11/20/2019. Lumbar mass, core biopsies: Soft tissue tumor with features consistent with sarcoma. 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 Necrosis: present. Histologic grade (fnclcc): grade 3.  -- 1/31/2020: CT C/A/P: Significant interval increase in size of the mass within the posterior left buttock which now measures 14.1 cm, compared to 5.1 cm on prior examination. A new indeterminant moderate compression fracture is present in the T5 vertebral body. Mild scarring is present in the lingula. -- S.p Neoadjuvant XRT. ([facility])  -- 6/11/2020 Left gluteal sarcoma resection - Dr. Lisa Sherwood at [facility] orthopedic oncology department. Preliminary report faxed to us which stated pleomorphic sarcoma s/p resection. She was planned to obtain chest CT in 2 months for interval assessment of lung lesion. -- Her recent CT at [facility] reported metastatic lung lesions. She was seen at [facility] recently and suggested palliative chemotherapy. The patient would like to have therapy at [facility] closer to her house. -- 12/08/2020 s.p Port placement. -- 12/10/2020 CT of chest/A/P: Large left gluteal mass present on prior exam is no longer present. Residual soft tissue and skin changes likely represent scarring. Innumerable new bilateral pulmonary nodules consistent with metastatic disease. There may also be pleural metastasis along the right major fissure inferiorly. Single borderline abnormal left periaortic lymph node unchanged. No new abnormal adenopathy. Stable T5 compression fracture. No new bony abnormalities. -- The patient would like to start her chemotherapy after holidays. -- NGS tumor profiling, liquid biopsy obtained. -- 1/8/2021 C1 D1 Gemcitabine   -- 1/15/2021 C1 D8 Gemcitabine  -- 1/29/2021 C2 D1 Gemcitabine  -- 2/5/2021 C2 D8 Gemcitabine was held d/t low ANC  -- 2/19/2021 C3 D1  -- 2/26/2021 C3 D8  -- 3/12/2021 C4 D1  -- 3/19/2021 C4 D8  -- 4/2/2021 C5 D1   -- She has tolerated chemotherapy fairly well with no high graded toxicities. -- 4/22/2021 CT Chest/A/P reported many of the pulmonary nodules are significantly decreased are no longer present, some of the pulmonary nodules have significantly increased in size since prior examination. This is compatible with a mixed response. -- Today I have reviewed with the patient about recent CT reports. We have discussed about the addition of pazopanib to gemcitabine  which has showed improved progression-free survival rates in patients with soft tissue sarcoma. I have explained the potential side effects of pazopanib with the patient and family. The patient was agreeable with the plan. Given the risk for potentially fatal hepatotoxicity, close monitoring of liver function tests is recommended, particularly in the first nine weeks of pazopanib therapy. She also denied current use of proton pump inhibitors or histamine H2 receptor antagonists. Plan:  -- We will continue Gemcitabine as schedule. -- Will plan to add Pazopanib, dose reducing at 600 mg oral daily  -- Monitor labs: CBC, CMP every 2 weeks  -- We will see the patient back to the clinic in 4 weeks. Always sooner if required. Gemcitabine:   Days 1 and 8: Gemcitabine 1,000mg/m2 IV over 90 to 120 minutes. Repeat cycle every 3 weeks. Growth factor support.       # B/L LE swelling  -- Will obtain Venous doppler to r/o DVT      # Chronic Anemia  # Anemia, chemotherapy-related  -- She has received PRBCs supportively. -- Will monitor  iron profile, ferritin, b12/folate and replacement accordingly      # Poor appetite  -- Ensure BID  -- Advised to keep hydrated. # Generalized bone pain, likely Neulasta related  -- Advised to take antihistamines such as Claritin or Benadryl or NSAIDs      Orders Placed This Encounter    DUPLEX LOWER EXT VENOUS BILAT     Standing Status:   Future     Number of Occurrences:   1     Standing Expiration Date:   10/27/2021    ondansetron (ZOFRAN ODT) 8 mg disintegrating tablet    Senna Laxative 8.6 mg tablet     Si 2 TABS BY MOUTH EVERY NIGHT FOR CONSTIPATION    PAZOPanib (VOTRIENT) 200 mg tablet     Sig: Take 3 Tabs by mouth daily. Dispense:  90 Tab     Refill:  3           Ms. Todd Aase has a reminder for a \"due or due soon\" health maintenance. I have asked that she contact her primary care provider for follow-up on this health maintenance. All of patient's questions answered to their apparent satisfaction. They verbally show understanding and agreement with aforementioned plan. Ana Gibbons MD  2021          About 40 minutes were spent for this encounter with more than 50% of the time spent in face-to-face counseling, discussing on diagnosis and management plan going forward, and co-ordination of care. Parts of this document has been produced using Dragon dictation system. Unrecognized errors in transcription may be present. Please do not hesitate to reach out for any questions or clarifications.       CC: Sekou Lizarraga MD

## 2021-04-30 NOTE — PROGRESS NOTES
Pharmacy Note     Name: Welton Hamman  : 1955  Estimated body surface area is 2.01 meters squared as calculated from the following:    Height as of this encounter: 165.1 cm (65\"). Weight as of this encounter: 88.5 kg (195 lb). Diagnosis: Soft tissue sarcoma  Treatment Plan: Gemcitabine   Cycle/Day: C6D8  Cycle Start Date: 21    Lab Results   Component Value Date/Time    WBC 2.8 (L) 2021 11:35 AM    PLATELET 733 (H)  11:35 AM     Lab Results   Component Value Date/Time    ABS. NEUTROPHILS 0.6 (L) 2021 11:35 AM     Lab Results   Component Value Date/Time    Creatinine 0.84 2021 03:00 PM     Most Recent Creatinine Clearance:  CrCl: 71.4 mL/min (based on Adjusted Body Weight)  Creatinine: 0.84 mg/dL (2021)      Pharmacy Intervention:  · It was discussed with Dr. Zeny Cadena about the patient's pretreatment labs ([address] = 0.6) and it was decided to hold the patient's gemcitabine today but to still give pegfilgrastim. The treatment plan was updated and pharmacy will continue to monitor accordingly.      Pharmacist: [place], PHARMD

## 2021-04-30 NOTE — PROGRESS NOTES
[facility] Progress Note    Date: 2021    Name: Jagjit Llanos              MRN: [id number]            : 1955      GEMZAR- D1, D8- Q21 DAYS  (CYCLE 6, D8 TODAY)       Ms. Selena Shipley arrived to [facility] at 1120. Ms Selena Shipley arrived via wheelchair with portable home oxygen concentrator at 3L/min nasal cannula. Pt was assessed and education was provided. Ms. Rashid Arellano vitals were reviewed. Visit Vitals  /69 (BP 1 Location: Right arm, BP Patient Position: Sitting)   Pulse 84   Temp 97.9 °F (36.6 °C)   Resp 18   Ht 5' 5\" (1.651 m)   Wt 88.5 kg (195 lb)   SpO2 100%   BMI 32.45 kg/m²       Pt's R upper chest port accessed. Brisk blood return/ flushes without difficulty. Blood drawn for CBC per order. Lab results obtained & reviewed. Recent Results (from the past 12 hour(s))   CBC WITH 3 PART DIFF    Collection Time: 21 11:35 AM   Result Value Ref Range    WBC 2.8 (L) 4.5 - 13.0 K/uL    RBC 2.22 (L) 4.10 - 5.10 M/uL    HGB 7.8 (L) 12.0 - 16 g/dL    HCT 24.0 (L) 36 - 48 %    .1 (H) 78 - 102 FL    MCH 35.1 (H) 25.0 - 35.0 PG    MCHC 32.5 31 - 37 g/dL    RDW 19.1 (H) 11.5 - 14.5 %    PLATELET 487 (H) 030 - 440 K/uL    NEUTROPHILS 21 (L) 40 - 70 %    MIXED CELLS 30 (H) 0.1 - 17 %    LYMPHOCYTES 50 (H) 14 - 44 %    ABS. NEUTROPHILS 0.6 (L) 1.8 - 9.5 K/UL    ABS. MIXED CELLS 0.8 0.0 - 2.3 K/uL    ABS. LYMPHOCYTES 1.4 1.1 - 5.9 K/UL    DF AUTOMATED         Notified NP Otoo of pt's CBC results & verbal order was given to proceed w/ tx today (pt due for Neulasta OBI following tx today). NS infusing as ordered at [facility]. Pre-medication Zofran 8 mg IVP administered as ordered followed by NS flush. Following Zofran administration, verbal orders were received from Dr. Martin/ [facility] pharmacy to hold pt's tx today and administer Neulasta 6mg SQ (not OBI) prior to discharge. D/w pt updated plan of care. Pt verbalized understanding.     Written/ verbal education given to pt regarding neutropenic precautions. Verbal education also given to pt regarding anemia/ iron-rich foods. Pt verbalized understanding. Neulasta 6mg administered as ordered SQ in patient's L upper arm. Band-aid to site. Flushed pt's port w/ heparin per order & de-accessed. Band-aid to site. Ms. Albert Dave tolerated well without complaints. Discharge/ follow-up instructions discussed w/ pt. Pt verbalized understanding. Patient armband removed and shredded. Ms. Albert Dave was discharged from [place] [address] in stable condition at . She is to return on 5/11/21 at [place] [address] for her next appointment.     Hawa Aguiar RN  April 30, 2021

## 2021-05-03 NOTE — TELEPHONE ENCOUNTER
Patient called to make sure she is ok to start on pazopanib because the pharmacy warned her about a possible drug interaction between the [address] and PAZOPANIB.  reviewed and advised patient continue to take medication as prescribed. Patient aware and verbalized understanding.

## 2021-05-06 NOTE — PROGRESS NOTES
Pharmacy Note Name: Roma Trinidad : 1955 Estimated body surface area is 2.01 meters squared as calculated from the following: 
  Height as of 21: 165.1 cm (65\"). Weight as of 21: 88.5 kg (195 lb). Diagnosis: Sarcoma Treatment Plan: Gemcitabine Cycle/Day: C7D1 Cycle Start Date: 2021 Lab Results Component Value Date/Time WBC 2.8 (L) 2021 11:35 AM  
 PLATELET 846 (H)  11:35 AM  
 
Lab Results Component Value Date/Time  
 ABS. NEUTROPHILS 0.6 (L) 2021 11:35 AM  
 
Lab Results Component Value Date/Time Creatinine 0.84 2021 03:00 PM  
 
Most Recent Creatinine Clearance: CrCl: 71.4 mL/min (based on Adjusted Body Weight) Creatinine: 0.84 mg/dL (2021) Pharmacy Intervention: 
 
Discussed treatment plan with Dr. Talat Tatum 
Due to neutropenia, Gemcitabine dose will be reduced by 25% from 1200 mg/m2 to 900 mg/m2 on days 1 and 8 of each cycle beginning with Cycle 7 on 2021. Plan has been updated in [facility] Will continue to monitor Pharmacist: JUDE Yusuf [facility]

## 2021-05-11 NOTE — PROGRESS NOTES
[facility] Progress Note Date: May 11, 2021 Name: Agustina Harley MRN: [id number] : 1955 Peripheral Lab Draw Ms. Bolton to [facility], ambulatory at 1500 accompanied by self. Pt was assessed and education was provided. Ms. Fred Pereyra vitals were reviewed and patient was observed for 5 minutes prior to treatment. Visit Vitals /80 (BP 1 Location: Right arm, BP Patient Position: Sitting) Pulse (!) 113 Temp 99.4 °F (37.4 °C) Resp 8 Ht 5' 5\" (1.651 m) Wt 82.2 kg (181 lb 3.2 oz) SpO2 94% BMI 30.15 kg/m² No results found for this or any previous visit (from the past 12 hour(s)). Blood obtained peripherally from [facility] first attempt with butterfly needle and sent to lab for CBC w/ Diff and CMP per written orders. No bleeding or hematoma noted at site. Gauze and coban applied. Ms. Pedrito Griffith tolerated the venipuncture, and had no complaints. Patient armband removed and shredded. Ms. Pedrito Griffith was discharged from [place] [address] in stable condition at 1515. Ky Gosselin Phlebotomist PCT May 11, 2021 
3:41 PM

## 2021-05-14 NOTE — PROGRESS NOTES
[facility] Progress Note Date: May 14, 2021 Name: Robert Goodwin MRN: [id number] : 1955 Chemotherapy Cycle: C7 D1. Ms Jose Batres arrived via wheelchair at 1200 with O2 at 3L/min nasal cannula. Pain to right leg 4/10. She took pain medication prior discharge. No acute distress noted. Positioned for comfort and reassured. Ms. Jose Batres was assessed and education was provided. She declined printed [employer] notes on general information and discharge instructions on gemzar. States she is currently on gemzar, tolerates well, and understands treatment plan. Ms. Daniel Ibarar vitals were reviewed and patient was observed for 5 minutes prior to treatment. Visit Vitals /62 Pulse (!) 109 Temp 97.5 °F (36.4 °C) Resp 20 Ht 5' 5\" (1.651 m) Wt 86 kg (189 lb 11.2 oz) SpO2 98% Breastfeeding No  
BMI 31.57 kg/m² Labs and weight okay for chemotherapy Results for Ryder Champagne (MRN [id number]) as of 2021 12:42 Ref. Range 2021 15:10 WBC Latest Ref Range: 4.6 - 13.2 K/uL 26.7 (H) RBC Latest Ref Range: 4.20 - 5.30 M/uL 3.11 (L) HGB Latest Ref Range: 12.0 - 16.0 g/dL 10.8 (L) HCT Latest Ref Range: 35.0 - 45.0 % 34.3 (L) MCV Latest Ref Range: 74.0 - 97.0 .3 (H) MCH Latest Ref Range: 24.0 - 34.0 PG 34.7 (H) MCHC Latest Ref Range: 31.0 - 37.0 g/dL 31.5 RDW Latest Ref Range: 11.6 - 14.5 % 19.5 (H) PLATELET Latest Ref Range: 135 - 420 K/uL 523 (H) MPV Latest Ref Range: 9.2 - 11.8 FL 8.8 (L) NEUTROPHILS Latest Ref Range: 40 - 73 % 87 (H) LYMPHOCYTES Latest Ref Range: 21 - 52 % 4 (L) MONOCYTES Latest Ref Range: 3 - 10 % 7 EOSINOPHILS Latest Ref Range: 0 - 5 % 1  
BASOPHILS Latest Ref Range: 0 - 2 % 0  
DF Latest Units:   AUTOMATED  
ABS. NEUTROPHILS Latest Ref Range: 1.8 - 8.0 K/UL 23.1 (H)  
ABS. LYMPHOCYTES Latest Ref Range: 0.9 - 3.6 K/UL 1.2  
ABS. MONOCYTES Latest Ref Range: 0.05 - 1.2 K/UL 1.8 (H)  
ABS.  EOSINOPHILS Latest Ref Range: 0.0 - 0.4 K/UL 0.2  
ABS. BASOPHILS Latest Ref Range: 0.0 - 0.1 K/UL 0.1 Sodium Latest Ref Range: 136 - 145 mmol/L 133 (L) Potassium Latest Ref Range: 3.5 - 5.5 mmol/L 4.3 Chloride Latest Ref Range: 100 - 111 mmol/L 95 (L)  
CO2 Latest Ref Range: 21 - 32 mmol/L 35 (H) Anion gap Latest Ref Range: 3.0 - 18 mmol/L 3 Glucose Latest Ref Range: 74 - 99 mg/dL 168 (H) BUN Latest Ref Range: 7.0 - 18 MG/DL 11 Creatinine Latest Ref Range: 0.6 - 1.3 MG/DL 0.75  
BUN/Creatinine ratio Latest Ref Range: 12 - 20   15 Calcium Latest Ref Range: 8.5 - 10.1 MG/DL 8.6 GFR est non-AA Latest Ref Range: >60 ml/min/1.73m2 >60  
GFR est AA Latest Ref Range: >60 ml/min/1.73m2 >60 Bilirubin, total Latest Ref Range: 0.2 - 1.0 MG/DL 0.6 Protein, total Latest Ref Range: 6.4 - 8.2 g/dL 8.8 (H) Albumin Latest Ref Range: 3.4 - 5.0 g/dL 2.6 (L) Globulin Latest Ref Range: 2.0 - 4.0 g/dL 6.2 (H) A-G Ratio Latest Ref Range: 0.8 - 1.7   0.4 (L) ALT Latest Ref Range: 13 - 56 U/L 26 AST Latest Ref Range: 10 - 38 U/L 39 (H) Alk. phosphatase Latest Ref Range: 45 - 117 U/L 270 (H) Dose reduced to 1800mg due to neutropenia. See pharmacist note. Right chest medi-port accessed wit # 20 gauge, 1' heath needle. Brisk flush/blood returns noted. N.S flush bag hung. Pre-medication Zofran 8 mg IVP given via port after brisk blood return obtained. Gemzar 1800 mg IV given over approx 90 min after brisk blood return obtained. Tolerated well. Medi-port flushed with N.S and HEPARIN per protocol, then heath needle removed. Site without redness, swelling, bleeding or tenderness. Bandaid applied. Patient Vitals for the past 8 hrs: 
 Temp Pulse Resp BP SpO2  
05/14/21 1212 97.5 °F (36.4 °C) (!) 109 20 107/62 98 % Ms. Bolton tolerated infusion, and had no complaints. Verbally reviewed discharge instructions with patient. She stated understanding. Patient armband removed and shredded. Ms. Junius Kanner was discharged from [place] [id number] in stable condition at 1430. She is to return on 5/21/2021 at 1100 for chemotherapy. Dontrell Lindo RN May 14, 2021

## 2021-05-21 NOTE — PROGRESS NOTES
Pharmacy Note Name: Liyah Bingham : 1955 Estimated body surface area is 1.99 meters squared as calculated from the following: 
  Height as of this encounter: 165.1 cm (65\"). Weight as of this encounter: 86.3 kg (190 lb 3.2 oz). Diagnosis: Sarcoma Treatment Plan: Gemzar Cycle/Day: O2Y71 Cycle Start Date: 2021 Lab Results Component Value Date/Time WBC 26.7 (H) 2021 03:10 PM  
 PLATELET 191 (H)  03:10 PM  
 
Lab Results Component Value Date/Time  
 ABS. NEUTROPHILS 23.1 (H) 2021 03:10 PM  
 
Lab Results Component Value Date/Time Creatinine 0.75 2021 03:10 PM  
 
Most Recent Creatinine Clearance: CrCl: 79.0 mL/min (based on Adjusted Body Weight) Creatinine: 0.75 mg/dL (2021) Pharmacy Intervention: Interaction between pazopanib and ondansetron noted. More significant with IV formulation of ondansetron Discussed with Dr. Charles Thomson.  Changed to oral ondansetron premedication Patient states she is not experience any nausea/vomiting and does not take her prescribed doses of ondansetron at home Will continue to monitor Pharmacist: Sarah Fair, [facility]

## 2021-05-21 NOTE — PROGRESS NOTES
[facility] Progress Note Date: May 21, 2021 Name: Luis Massey MRN: [id number] : 1955 GEMZAR- D1, D8- Q21 DAYS 
(CYCLE 7, D8 TODAY) Ms. Aydee Zaldivar arrived to [facility] at 200. Ms Aydee Zaldivar arrived via wheelchair with portable home oxygen concentrator at 3L/min nasal cannula. Pt was assessed and education was provided. Ms. Lawson Piña vitals were reviewed. Visit Vitals BP 95/63 (BP 1 Location: Right arm, BP Patient Position: Sitting) Pulse 93 Temp 98.2 °F (36.8 °C) Resp 18 Ht 5' 5\" (1.651 m) Wt 86.3 kg (190 lb 3.2 oz) SpO2 96% BMI 31.65 kg/m² Pt's R upper chest port accessed. Brisk blood return/ flushes without difficulty. Blood drawn for CBC per order. Lab results were obtained and reviewed and are within tx plan parameters to proceed w/ tx today. Recent Results (from the past 12 hour(s)) CBC WITH AUTOMATED DIFF Collection Time: 21 12:05 PM  
Result Value Ref Range WBC 4.3 (L) 4.6 - 13.2 K/uL  
 RBC 2.65 (L) 4.20 - 5.30 M/uL HGB 9.1 (L) 12.0 - 16.0 g/dL HCT 27.9 (L) 35.0 - 45.0 % .3 (H) 74.0 - 97.0 FL  
 MCH 34.3 (H) 24.0 - 34.0 PG  
 MCHC 32.6 31.0 - 37.0 g/dL  
 RDW 17.8 (H) 11.6 - 14.5 % PLATELET 352 666 - 916 K/uL MPV 8.8 (L) 9.2 - 11.8 FL  
 NEUTROPHILS 30 (L) 40 - 73 % LYMPHOCYTES 38 21 - 52 % MONOCYTES 24 (H) 3 - 10 % EOSINOPHILS 5 0 - 5 % BASOPHILS 3 (H) 0 - 2 %  
 ABS. NEUTROPHILS 1.3 (L) 1.8 - 8.0 K/UL  
 ABS. LYMPHOCYTES 1.6 0.9 - 3.6 K/UL  
 ABS. MONOCYTES 1.0 0.05 - 1.2 K/UL  
 ABS. EOSINOPHILS 0.2 0.0 - 0.4 K/UL  
 ABS. BASOPHILS 0.1 0.0 - 0.1 K/UL  
 DF AUTOMATED Pre-medication Zofran 8mg ODT administered as ordered. (See [facility] pharmacist 21 note for additional info.) 
 
NS infusing as ordered at [facility]. Gemzar 1800mg IV administered as ordered followed by NS flush. Neulasta on-body injector placed on pt's RUQ abdomen.  Verified proper functioning of unit by visualizing green blinking light. Pt verbalized understanding regarding the function of the on-body injector and when it can be removed/ and disposed of (approximately 7:30pm tomorrow evening). Ms. Flaco Charles tolerated well without complaints. Discharge/ follow-up instructions discussed w/ pt. Pt verbalized understanding. Flushed pt's port w/ heparin per order & de-accessed. Band-aid to site. Patient armband removed and shredded. Ms. Flaco Charles was discharged from [place] [address] in stable condition at 1525. She is to return on 6/1/21 at 1430 for her next appointment. Fer Mckeon RN May 21, 2021

## 2021-05-30 NOTE — PROGRESS NOTES
Hematology/Oncology Note    Name: Welton Hamman  Date: 2021  : 1955    Alfie Massey MD       Oncology History:  Ms. Leodan Rojas  is a 77 y.o. -American woman who has follow-up for her left gluteal mass. -- In 2019 she had an ultrasound of the area and there was no evidence of a cystic component the area was described as heterogeneous measuring about 6.8 x 4.85 8.5 cm. An attempt to aspirate fluid from the left posterior hip/gluteal mass was unsuccessful. More recently she states that the mass has actually doubled in size and is now about the size of a softball.   -- 2019. Liver, needle biopsy: mild to moderate chronic hepatitis (NOVA and MATI score 2-3) with bridging fibrosis (nova and mati score 3); see comment. -- 2019. Lumbar mass, core biopsies: Soft tissue tumor with features consistent with sarcoma. 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 Necrosis: present. Histologic grade (fnclcc): grade 3.  -- 2020: CT C/A/P: Significant interval increase in size of the mass within the posterior left buttock which now measures 14.1 cm, compared to 5.1 cm on prior examination. A new indeterminant moderate compression fracture is present in the T5 vertebral body.  -- S.p Neoadjuvant XRT. (Chula Santos)  -- 2020 Left gluteal sarcoma resection  -- Her recent CT at [facility] reported metastatic lung lesions. She was seen at [facility] recently and suggested palliative chemotherapy. The patient would like to have therapy at [facility] closer to her house. -- 2020 s.p Port placement. -- 12/10/2020 CT of chest/A/P: Large left gluteal mass present on prior exam is no longer present. Residual soft tissue and skin changes likely represent scarring. Innumerable new bilateral pulmonary nodules consistent with metastatic disease. There may also be pleural metastasis along the right major fissure inferiorly. Single borderline abnormal left periaortic lymph node unchanged. No new abnormal adenopathy. Stable T5 compression fracture. No new bony abnormalities. -- The patient would like to start her chemotherapy after holidays. -- NGS tumor profiling, liquid biopsy obtained. -- 1/8/2021 C1 D1 Gemcitabine   -- 1/15/2021 C1 D8 Gemcitabine  -- 1/29/2021 C2 D1 Gemcitabine  -- 2/5/2021 C2 D8 Gemcitabine was held d/t low ANC  -- 2/19/2021 C3 D1  -- 3/12/2021 C4 D1  -- 4/2/2021 C5 D1   -- She has tolerated chemotherapy fairly well with no high graded toxicities. -- 4/22/2021 CT Chest/A/P reported many of the pulmonary nodules are significantly decreased are no longer present, some of the pulmonary nodules have significantly increased in size since prior examination. This is compatible with a mixed response.  -- 5/28/2021 She has started Pazopanib, dose reducing at 600 mg oral daily. Subjective:   Chief complaint: Gluteal mass, sarcoma    History of present illness:  Ms. Pedrito Griffith is a 57-year-old -American woman who states that about a year ago she noticed a small masslike lesion over her left gluteal area. She did not think much of it. However over the past few months it has started to enlarge in size. She had mass biopsied which pathology reported sarcoma,undifferentiated pleomorphic. She had completed neoadjuvant radiation therapy. Subsequently she underwent resection on 6/11/2020 by Dr. Vinicius Grove. Her CT at [facility] reported metastatic lung lesions. She was seen at [facility] and suggested palliative chemotherapy. The patient would like to have therapy at [facility] closer to her house. She has chronic SOB from long-standing COPD. She is oxygen dependent. Today she presented here for follow up with chemotherapy. She has started  C1D1 Gemcitabine on 1/8/2021. She was accompanied by her family today. Since 5/28/2021 She has started Pazopanib, dose reducing at 600 mg oral daily. She reported feeling better today.  She reported some fatigue and decreasing appetite after each therapy but improved to her baseline afterwards. She reported her breathing was stable. She has no other complaints. Denied fever, chills, night sweat, unintentional weight loss, skin lumps or bumps, acute bleeding or bruising issues. Denied headache, acute vision change, dizziness, chest pain, palpitation, productive cough, nausea, vomiting, abdominal pain, altered bowel habits, dysuria, new bone pain or back pain, focal numbness or weakness.        Past Medical History:   Diagnosis Date    Acid reflux     Anxiety     Bipolar 1 disorder (HCC)     Bronchitis     Cirrhosis (HCC)     Coughing     Depression     DVT (deep vein thrombosis) in pregnancy     ETOH abuse     Gastritis     Hepatic encephalopathy (HCC)     Hepatitis     Joint pain     Leukocytosis     Lumbar disc disease     Shortness of breath     Stress     Teeth decayed     Vitamin D deficiency     Weakness of left leg        Allergies   Allergen Reactions    Aspirin Nausea and Vomiting       Past Surgical History:   Procedure Laterality Date    EGD      HAND/FINGER SURGERY UNLISTED      HX BACK SURGERY      HX OVARIAN CYST REMOVAL      HX SPLENECTOMY      IR INSERT TUNL CVC W PORT OVER 5 YEARS  12/8/2020       Social History     Socioeconomic History    Marital status:      Spouse name: Not on file    Number of children: Not on file    Years of education: Not on file    Highest education level: Not on file   Occupational History    Not on file   Tobacco Use    Smoking status: Current Every Day Smoker    Smokeless tobacco: Never Used   Substance and Sexual Activity    Alcohol use: Not Currently    Drug use: Yes     Types: Cocaine, Heroin    Sexual activity: Yes   Other Topics Concern    Not on file   Social History Narrative    Not on file     Social Determinants of Health     Financial Resource Strain:     Difficulty of Paying Living Expenses:    Food Insecurity:     Worried About Running Out of Food in the Last Year:    [address] in the Last Year:    Transportation Needs:     Lack of Transportation (Medical):  Lack of Transportation (Non-Medical):    Physical Activity:     Days of Exercise per Week:     Minutes of Exercise per Session:    Stress:     Feeling of Stress :    Social Connections:     Frequency of Communication with Friends and Family:     Frequency of Social Gatherings with Friends and Family:     Attends Yazdanism Services:     Active Member of Clubs or Organizations:     Attends Club or Organization Meetings:     Marital Status:    Intimate Partner Violence:     Fear of Current or Ex-Partner:     Emotionally Abused:     Physically Abused:     Sexually Abused:        Family History   Problem Relation Age of Onset    Heart Disease Mother     Stroke Mother     Heart Disease Father     Heart Disease Brother     Hypertension Brother        Current Outpatient Medications   Medication Sig Dispense Refill    ondansetron (ZOFRAN ODT) 8 mg disintegrating tablet       PAZOPanib (VOTRIENT) 200 mg tablet Take 3 Tabs by mouth daily. 90 Tab 3    lidocaine-prilocaine (EMLA) topical cream Apply  to affected area as needed (30-60 minutes before port is to be accessed). 30 g 0    silver sulfADIAZINE (SILVADENE) 1 % topical cream APPLY TO AFFECTED AREA 3 TIMES A DAY      diclofenac (VOLTAREN) 1 % gel APPLY 4 GM TO LEFT THIGH/BUTTOCKS 4 TIMES A DAY AS NEEDED FOR PAIN      ondansetron hcl (ZOFRAN) 4 mg tablet       QUEtiapine (SEROquel) 50 mg tablet TAKE 1 TABLET BY MOUTH AT BEDTIME      ARIPiprazole (ABILIFY) 5 mg tablet daily.  esomeprazole (NEXIUM) 20 mg capsule TAKE 20 MG BY MOUTH ONCE A DAY.       ADVAIR -21 mcg/actuation inhaler       hydrOXYzine HCl (ATARAX) 25 mg tablet       albuterol-ipratropium (DUO-NEB) 2.5 mg-0.5 mg/3 ml nebu PLEASE SEE ATTACHED FOR DETAILED DIRECTIONS  6    DAILY-GOIRGIO tablet TAKE 1 TABLET BY MOUTH EVERY DAY      OLANZapine (ZYPREXA) 10 mg tablet       predniSONE (DELTASONE) 10 mg tablet Take 10 mg by mouth.  albuterol (PROVENTIL HFA, VENTOLIN HFA, PROAIR HFA) 90 mcg/actuation inhaler Take 2 Puffs by inhalation.  buprenorphine-naloxone (SUBOXONE) 8-2 mg film sublingaul film 1 Each by SubLINGual route.  SUBOXONE 8-2 mg film sublingaul film DISSOLVE 1 FILM UNDER THE TONGUE TWICE DAILY  0    buPROPion XL (WELLBUTRIN XL) 150 mg tablet TAKE 1 TABLET BY MOUTH EVERY DAY  3    busPIRone (BUSPAR) 7.5 mg tablet Take 7.5 mg by mouth.  ergocalciferol (ERGOCALCIFEROL) 50,000 unit capsule TAKE 1 CAP BY MOUTH EVERY FRIDAY. 1    folic acid (FOLVITE) 1 mg tablet Take  by mouth daily.  albuterol 2.5 mg /3 mL (0.083 %) nebu 3 mL, albuterol-ipratropium 2.5 mg-0.5 mg/3 ml nebu 3 mL 1 Dose by Nebulization route.  lactulose (KRISTALOSE) 10 gram packet Take 10 g by mouth three (3) times daily.  montelukast (SINGULAIR) 10 mg tablet Take 10 mg by mouth daily.  naproxen (NAPROSYN) 500 mg tablet Take 500 mg by mouth two (2) times daily (with meals).  omeprazole (PRILOSEC) 40 mg capsule Take 40 mg by mouth daily.  thiamine HCL (VITAMIN B-1) 100 mg tablet Take  by mouth daily.  tiotropium (SPIRIVA WITH HANDIHALER) 18 mcg inhalation capsule Take 1 Cap by inhalation daily.  Senna Laxative 8.6 mg tablet 1 2 TABS BY MOUTH EVERY NIGHT FOR CONSTIPATION (Patient not taking: Reported on 6/1/2021)      cyclobenzaprine (FLEXERIL) 5 mg tablet  (Patient not taking: Reported on 6/1/2021)      furosemide (LASIX) 40 mg tablet  (Patient not taking: Reported on 6/1/2021)      mirtazapine (REMERON) 15 mg tablet TAKE 1 TABLET BY MOUTH EVERYDAY AT BEDTIME (Patient not taking: Reported on 6/1/2021)      hydrOXYzine pamoate (VISTARIL) 25 mg capsule Take 25 mg by mouth three (3) times daily as needed for Itching. (Patient not taking: Reported on 6/1/2021)      spironolactone (ALDACTONE) 25 mg tablet Take  by mouth daily.  (Patient not taking: Reported on 6/1/2021)       Review of Systems   Constitutional: Positive for malaise/fatigue. Negative for chills, diaphoresis, fever and weight loss. Respiratory: Positive for shortness of breath (chronic SOB). Negative for cough, hemoptysis and wheezing. Cardiovascular: Negative for chest pain, palpitations and leg swelling. Gastrointestinal: Negative for abdominal pain, diarrhea, heartburn, nausea and vomiting. Genitourinary: Negative for dysuria, frequency, hematuria and urgency. Musculoskeletal: Negative for joint pain and myalgias. Skin: Negative for itching and rash. Neurological: Negative for dizziness, seizures, weakness and headaches. Psychiatric/Behavioral: Negative for depression. The patient does not have insomnia. Objective:     Visit Vitals  /80 (BP 1 Location: Right arm, BP Patient Position: Sitting)   Pulse 80   Temp 97.2 °F (36.2 °C) (Temporal)   Resp 18   Ht 5' 5\" (1.651 m)   Wt 82.4 kg (181 lb 9.6 oz)   SpO2 95%   BMI 30.22 kg/m²       ECOG Performance Status (grade): 2  0 - able to carry on all pre-disease activity w/out restriction  1 - restricted but able to carry out light work  2 - ambulatory and can self- care but unable to carry out work  3 - bed or chair >50% of waking hours  4 - completely disable, total care, confined to bed or chair    Physical Exam  Constitutional:       Appearance: Normal appearance. Comments: On chronic oxygen   HENT:      Head: Normocephalic and atraumatic. Eyes:      Pupils: Pupils are equal, round, and reactive to light. Cardiovascular:      Rate and Rhythm: Normal rate and regular rhythm. Heart sounds: Normal heart sounds. Pulmonary:      Effort: Pulmonary effort is normal.      Breath sounds: Normal breath sounds. Abdominal:      General: Bowel sounds are normal.      Palpations: Abdomen is soft. Tenderness: There is no abdominal tenderness. There is no guarding.    Musculoskeletal: General: Normal range of motion. Cervical back: Neck supple. Right lower leg: Edema present. Left lower leg: Edema present. Comments: Left gluteal wound: clean, healing well, no redness or infected. Skin:     General: Skin is warm. Neurological:      General: No focal deficit present. Mental Status: She is alert and oriented to person, place, and time. Mental status is at baseline. Diagnostics:      No results found for this or any previous visit (from the past 96 hour(s)). Imaging:  Results for orders placed in visit on 10/27/20    IR INSERT TUNL CVC W PORT OVER 5 YEARS      No results found for this or any previous visit. Results for orders placed in visit on 11/25/20    CT CHEST ABD PELV W CONT               Assessment:   # Soft tissue mass, left gluteal area:   # Undifferentiated pleomorphic sarcoma  # Lung metastases  # Bone pain, Neulasta-related    Plan:   # Undifferentiated pleomorphic sarcoma  # Lung metastases  # Soft tissue mass, left gluteal area:   -- In February 2019 she had an ultrasound of the area and there was no evidence of a cystic component the area was described as heterogeneous measuring about 6.8 x 4.85 8.5 cm. An attempt to aspirate fluid from the left posterior hip/gluteal mass was unsuccessful. More recently she states that the mass has actually doubled in size and is now about the size of a softball.   -- 4/5/2019. Liver, needle biopsy: mild to moderate chronic hepatitis (NOVA and BELLA score 2-3) with bridging fibrosis (nova and bella score 3); see comment. -- 11/20/2019. Lumbar mass, core biopsies: Soft tissue tumor with features consistent with sarcoma. 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 Necrosis: present.  Histologic grade (fnclcc): grade 3.  -- 1/31/2020: CT C/A/P: Significant interval increase in size of the mass within the posterior left buttock which now measures 14.1 cm, compared to 5.1 cm on prior examination. A new indeterminant moderate compression fracture is present in the T5 vertebral body. Mild scarring is present in the lingula. -- S.p Neoadjuvant XRT. (Basilia Sinclair)  -- 6/11/2020 Left gluteal sarcoma resection - Dr. Gautam Sim at [facility] orthopedic oncology department. Preliminary report faxed to us which stated pleomorphic sarcoma s/p resection. She was planned to obtain chest CT in 2 months for interval assessment of lung lesion. -- Her recent CT at [facility] reported metastatic lung lesions. She was seen at [facility] recently and suggested palliative chemotherapy. The patient would like to have therapy at [facility] closer to her house. -- 12/08/2020 s.p Port placement. -- 12/10/2020 CT of chest/A/P: Large left gluteal mass present on prior exam is no longer present. Residual soft tissue and skin changes likely represent scarring. Innumerable new bilateral pulmonary nodules consistent with metastatic disease. There may also be pleural metastasis along the right major fissure inferiorly. Single borderline abnormal left periaortic lymph node unchanged. No new abnormal adenopathy. Stable T5 compression fracture. No new bony abnormalities. -- The patient would like to start her chemotherapy after holidays. -- NGS tumor profiling, liquid biopsy obtained. -- 1/8/2021 C1 D1 Gemcitabine   -- 1/15/2021 C1 D8 Gemcitabine  -- 1/29/2021 C2 D1 Gemcitabine  -- 2/5/2021 C2 D8 Gemcitabine was held d/t low ANC  -- 2/19/2021 C3 D1  -- 3/12/2021 C4 D1  -- 4/2/2021 C5 D1   -- She has tolerated chemotherapy fairly well with no high graded toxicities. -- 4/22/2021 CT Chest/A/P reported many of the pulmonary nodules are significantly decreased are no longer present, some of the pulmonary nodules have significantly increased in size since prior examination. This is compatible with a mixed response.  -- 5/28/2021 She has started Pazopanib, dose reducing at 600 mg oral daily. Today she reported doing stable overall.  Denied any early tolerating issues from Pazopanib. Plan:  -- We will continue Gemcitabine as schedule. -- The patient will continue Pazopanib, dose reducing at 600 mg oral daily. She denied current use of proton pump inhibitors or histamine H2 receptor antagonists. -- Monitor labs: CBC, CMP every 2 weeks. The patient will need close monitoring of liver function tests, particularly in the first nine weeks of pazopanib therapy. -- We will see the patient back to the clinic in 4 weeks. Always sooner if required. Gemcitabine:   Days 1 and 8: Gemcitabine 1,000mg/m2 IV over 90 to 120 minutes. Repeat cycle every 3 weeks. Growth factor support. # B/L LE swelling  -- 5/5/2021 Venous doppler reported no DVT demonstrated bilateral lower extremity. # Chronic Anemia  # Anemia, chemotherapy-related  -- She has received PRBCs supportively. -- Will monitor  iron profile, ferritin, b12/folate and replacement accordingly      # Poor appetite  -- Ensure BID  -- Advised to keep hydrated. # Generalized bone pain, likely Neulasta related  -- Advised to take antihistamines such as Claritin or Benadryl or NSAIDs      Orders Placed This Encounter    CBC WITH AUTOMATED DIFF     Standing Status:   Future     Standing Expiration Date:   5/5/4996    METABOLIC PANEL, COMPREHENSIVE     Standing Status:   Future     Standing Expiration Date:   6/2/2022           Ms. Hicks  has a reminder for a \"due or due soon\" health maintenance. I have asked that she contact her primary care provider for follow-up on this health maintenance. All of patient's questions answered to their apparent satisfaction. They verbally show understanding and agreement with aforementioned plan. Lorna Bess MD  6/1/2021          About 30 minutes were spent for this encounter with more than 50% of the time spent in face-to-face counseling, discussing on diagnosis and management plan going forward, and co-ordination of care.   Parts of this document has been produced using Dragon dictation system. Unrecognized errors in transcription may be present. Please do not hesitate to reach out for any questions or clarifications.       CC: Linda Johnston MD

## 2021-06-04 NOTE — PROGRESS NOTES
[facility] Progress Note Date: 2021 Name: Shakila Henriquez MRN: [id number] : 1955 Chemotherapy Cycle: C8D1. Ms Verner Pinal arrived via wheelchair at [phone] with O2 at 3L/min nasal cannula. Ms. Verner Pinal was assessed and education was provided. Ms. Pavithra Reece vitals were reviewed and patient was observed for 5 minutes prior to treatment. Visit Vitals /74 (BP 1 Location: Left upper arm, BP Patient Position: At rest) Pulse 90 Temp 97.3 °F (36.3 °C) Resp 18 Ht 5' 5\" (1.651 m) Wt 83.1 kg (183 lb 4 oz) SpO2 97% Breastfeeding No  
BMI 30.49 kg/m² Labs and weight okay for chemotherapy Results for Sylvie Garcias (MRN [id number]) as of 2021 13:42 Ref. Range 2021 03:34 WBC Latest Ref Range: 3.4 - 10.8 x10E3/uL 8.2 RBC Latest Ref Range: 3.77 - 5.28 x10E6/uL 2.95 (L) HGB Latest Ref Range: 11.1 - 15.9 g/dL 10.2 (L) HCT Latest Ref Range: 34.0 - 46.6 % 29.4 (L) MCV Latest Ref Range: 79 - 97 fL 100 (H) MCH Latest Ref Range: 26.6 - 33.0 pg 34.6 (H) MCHC Latest Ref Range: 31.5 - 35.7 g/dL 34.7 RDW Latest Ref Range: 11.7 - 15.4 % 17.1 (H) PLATELET Latest Ref Range: 150 - 450 x10E3/uL 125 (L) NEUTROPHILS Latest Ref Range: Not Estab. % 63 Lymphocytes Latest Ref Range: Not Estab. % 19 MONOCYTES Latest Ref Range: Not Estab. % 11 EOSINOPHILS Latest Ref Range: Not Estab. % 6  
BASOPHILS Latest Ref Range: Not Estab. % 1 IMMATURE GRANULOCYTES Latest Ref Range: Not Estab. % 0  
ABS. NEUTROPHILS Latest Ref Range: 1.4 - 7.0 x10E3/uL 5.2  
ABS. IMM. GRANS. Latest Ref Range: 0.0 - 0.1 x10E3/uL 0.0 Abs Lymphocytes Latest Ref Range: 0.7 - 3.1 x10E3/uL 1.5  
ABS. MONOCYTES Latest Ref Range: 0.1 - 0.9 x10E3/uL 0.9  
ABS. EOSINOPHILS Latest Ref Range: 0.0 - 0.4 x10E3/uL 0.5 (H)  
ABS. BASOPHILS Latest Ref Range: 0.0 - 0.2 x10E3/uL 0.1 Sodium Latest Ref Range: 134 - 144 mmol/L 132 (L) Potassium Latest Ref Range: 3.5 - 5.2 mmol/L 5.3 (H)  
Chloride Latest Ref Range: 96 - 106 mmol/L 92 (L)  
CO2 Latest Ref Range: 20 - 29 mmol/L 31 (H) Glucose Latest Ref Range: 65 - 99 mg/dL 125 (H) BUN Latest Ref Range: 8 - 27 mg/dL 11 Creatinine Latest Ref Range: 0.57 - 1.00 mg/dL 0.88 BUN/Creatinine ratio Latest Ref Range: 12 - 28  13 Calcium Latest Ref Range: 8.7 - 10.3 mg/dL 8.4 (L)  
GFR est non-AA Latest Ref Range: >59 mL/min/1.73 68 GFR est AA Latest Ref Range: >59 mL/min/1.73 79 Bilirubin, total Latest Ref Range: 0.0 - 1.2 mg/dL 0.6 Protein, total Latest Ref Range: 6.0 - 8.5 g/dL 7.6 Albumin Latest Ref Range: 3.8 - 4.8 g/dL 2.9 (L) A-G Ratio Latest Ref Range: 1.2 - 2.2  0.6 (L) ALT Latest Ref Range: 0 - 32 IU/L 24 AST Latest Ref Range: 0 - 40 IU/L 28 Alk. phosphatase Latest Ref Range: 48 - 121 IU/L 235 (H) Dose reduced to 1800mg due to neutropenia. See pharmacist note. Right chest medi-port accessed with sterile technique using a  # 20 gauge, 1' heath needle. Brisk flush/blood return noted. NS  Infusing at [facility]. Pre-medication Zofran 8 mg ODT given as ordered. Gemzar 1800 mg IV given over approximately 90 minutes after brisk blood return obtained. Ms. Vidal Gomes tolerated well. Medi-port flushed with 30mls NS, followed by 500u heparin per protocol, then heath needle removed. Site without redness, swelling, bleeding or tenderness. Bandaid applied. Patient Vitals for the past 8 hrs: 
 Temp Pulse Resp BP SpO2  
06/04/21 1322 97.3 °F (36.3 °C) 90 18 100/74 97 % 06/04/21 1126 97.6 °F (36.4 °C) 87 18 118/79 97 % Ms. Bolton tolerated infusion, and had no complaints. Verbally reviewed discharge instructions with patient. She stated understanding. Patient armband removed and shredded. Ms. Vidal Gomes was discharged from [place] [id number] in stable condition at 1330. She is to return on 6/21/2021 at 1100 for chemotherapy. Petty Moran RN 
June 4, 2021

## 2021-06-11 NOTE — PROGRESS NOTES
[facility] Progress Note Date: 2021 Name: Scottie Bautista MRN: [id number] : 1955 Ms. Maikol Dempsey arrived in the [facility] today at 1120, in stable condition, here for Cycle 8, Day 8, IV GEMZAR Chemotherapy Infusion (Days 1 & 8, of Every 21 Day Cycle). She was assessed and education was provided. Ms. Mehnaz Barron vitals were reviewed. Visit Vitals BP (!) 141/87 (BP 1 Location: Right upper arm, BP Patient Position: Sitting) Pulse 93 Temp 98.4 °F (36.9 °C) Resp 20 Ht 5' 5\" (1.651 m) Wt 81.3 kg (179 lb 3.2 oz) SpO2 96% BMI 29.82 kg/m² Her right chest single lumen port was accessed without incident at 1140, and brisk blood return was obtained. Blood for the ordered CBC was drawn, and was processed on site. The preliminary Platelet Count was noted to be 377,000, but was sent out to the [facility] hospital lab to obtain a final Platelet Count Result. All results listed below were noted to be satisfactory for treatment today. Recent Results (from the past 12 hour(s)) CBC WITH 3 PART DIFF Collection Time: 21 11:40 AM  
Result Value Ref Range WBC 1.5 (L) 4.5 - 13.0 K/uL  
 RBC 3.37 (L) 4.10 - 5.10 M/uL  
 HGB 11.2 (L) 12.0 - 16 g/dL HCT 34.5 (L) 36 - 48 % .4 (H) 78 - 102 FL  
 MCH 33.2 25.0 - 35.0 PG  
 MCHC 32.5 31 - 37 g/dL  
 RDW 17.4 (H) 11.5 - 14.5 % NEUTROPHILS 65 40 - 70 % MIXED CELLS 12 0.1 - 17 % LYMPHOCYTES 23 14 - 44 % ABS. NEUTROPHILS 1.0 (L) 1.8 - 9.5 K/UL  
 ABS. MIXED CELLS 0.2 0.0 - 2.3 K/uL  
 ABS. LYMPHOCYTES 0.3 (L) 1.1 - 5.9 K/UL  
 DF AUTOMATED PLATELET COUNT Collection Time: 21 11:40 AM  
Result Value Ref Range PLATELET 334 372 - 711 K/uL  
 
 
 
 
 
 250 ml IV Bag NS was initiated to infuse @ KVO PRN throughout treatment today. The following pre-medication was administered per order and without incident: Zofran 8 mg ODT.   
 
The following chemotherapy medication was administered: Gemcitabine (GEMZAR) 1,800 mg IV (900 mg/m2), was administered over approximately 90 minutes, per order, and without incident. (GEMZAR was dose reduced from 1200 mg/m2 to 900 mg/m2, starting with cycle 7, due to neutropenia, per pharmacy & Dr. Murray Wood.) After completion of all ordered IV medications, her port was flushed very well per protocol with NS & Heparin, and then the heath needle was removed and gauze/bandaid was applied. Neulasta 6 mg SQ On Body Injector was applied to the back of her left arm at 1430, per order and without incident. And, after application, the green light was noted to be flashing appropriately. Ms. Braydon Childs tolerated her treatment very well today, and voiced no complaints. Ms. Braydon Childs was discharged from [place] [address] in stable condition at [address] Savana Page She is scheduled to return on Tuesday, 6-22-21 at 1400 for pre-chemo labs. And then cycle 9, day 1, Gemzar chemotherapy is scheduled for Friday, 6-25-21 at 1100. Savana Ibanez RN 
June 11, 2021 
11:58 AM

## 2021-06-22 NOTE — PROGRESS NOTES
[facility] Progress Note Date: 2021 Name: Anthony Valencia MRN: [id number] : 1955 Pre chemo labs Ms. Bolton to [facility], via wheelchair at 1500. Pt was assessed and education was provided. Ms. Josue Durbin vitals were reviewed and patient was observed for 5 minutes prior to treatment. Visit Vitals Pulse 82 Temp 99.1 °F (37.3 °C) Resp 20 Ht 5' 5\" (1.651 m) Wt 82.1 kg (181 lb) SpO2 100% BMI 30.12 kg/m² Blood obtained peripherally from right FA x first attempt with butterfly needle and sent to lab for CBC w/ Diff and CMP per written orders. No bleeding or hematoma noted at site. Gauze and coban applied. Ms. Maryann Donovan tolerated the venipuncture, and had no complaints. Patient armband removed and shredded. Ms. Maryann Donovan was discharged from [place] [address] in stable condition at 1515. Estella Brush RN 
2021

## 2021-06-24 NOTE — PROGRESS NOTES
LVOM for patient about her treatment on 6/25/2021. Unfortunately she can not have her treatment due to low Platelet.

## 2021-06-24 NOTE — PROGRESS NOTES
Pharmacy Note     Name: Dodie Christianson  : 1955  Estimated body surface area is 1.94 meters squared as calculated from the following:    Height as of 21: 165.1 cm (65\"). Weight as of 21: 82.1 kg (181 lb). Diagnosis: Soft tissue sarcoma  Treatment Plan: Gemcitabine   Cycle/Day: C9D1  Cycle Start Date: 21    Lab Results   Component Value Date/Time    WBC 3.7 (L) 2021 03:08 PM    PLATELET 30 (L)  03:08 PM     Lab Results   Component Value Date/Time    ABS. NEUTROPHILS 1.6 (L) 2021 03:08 PM     Lab Results   Component Value Date/Time    Creatinine 0.69 2021 03:08 PM     Most Recent Creatinine Clearance:  CrCl: 82.5 mL/min (based on Adjusted Body Weight)  Creatinine: 0.70 mg/dL (2021)      Pharmacy Intervention:  · Patient report for pre-treatment labs on 21 which resulted thrombocytopenic (see above). After discussing with Dr. Kobi Humphires, it was determined to hold the patient's treatment scheduled for 21 and upon rescheduling, to decrease the dose of gemcitabine by 25%. · The treatment plan will be updated accordingly and pharmacy will continue to monitor.      Pharmacist: Mannie Mojica, SHASHID

## 2021-06-28 NOTE — PROGRESS NOTES
Hematology/Oncology Note    Name: Luis Massey  Date: 2021  : 1955    Codey Santos MD       Oncology History:  Ms. Aydee Zaldivar  is a 77 y.o. -American woman who has follow-up for her left gluteal mass. -- In 2019 she had an ultrasound of the area and there was no evidence of a cystic component the area was described as heterogeneous measuring about 6.8 x 4.85 8.5 cm. An attempt to aspirate fluid from the left posterior hip/gluteal mass was unsuccessful. More recently she states that the mass has actually doubled in size and is now about the size of a softball.   -- 2019. Liver, needle biopsy: mild to moderate chronic hepatitis (NOVA and MATI score 2-3) with bridging fibrosis (noav and mati score 3); see comment. -- 2019. Lumbar mass, core biopsies: Soft tissue tumor with features consistent with sarcoma. 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 Necrosis: present. Histologic grade (fnclcc): grade 3.  -- 2020: CT C/A/P: Significant interval increase in size of the mass within the posterior left buttock which now measures 14.1 cm, compared to 5.1 cm on prior examination. A new indeterminant moderate compression fracture is present in the T5 vertebral body.  -- S.p Neoadjuvant XRT. (Nereida Solano)  -- 2020 Left gluteal sarcoma resection  -- Her recent CT at [facility] reported metastatic lung lesions. She was seen at [facility] recently and suggested palliative chemotherapy. The patient would like to have therapy at [facility] closer to her house. -- 2020 s.p Port placement. -- 12/10/2020 CT of chest/A/P: Large left gluteal mass present on prior exam is no longer present. Residual soft tissue and skin changes likely represent scarring. Innumerable new bilateral pulmonary nodules consistent with metastatic disease. There may also be pleural metastasis along the right major fissure inferiorly. Single borderline abnormal left periaortic lymph node unchanged. No new abnormal adenopathy. Stable T5 compression fracture. No new bony abnormalities. -- The patient would like to start her chemotherapy after holidays. -- NGS tumor profiling, liquid biopsy obtained. -- 1/8/2021 C1 D1 Gemcitabine   -- 1/15/2021 C1 D8 Gemcitabine  -- 1/29/2021 C2 D1 Gemcitabine  -- 2/5/2021 C2 D8 Gemcitabine was held d/t low ANC  -- 2/19/2021 C3 D1  -- 3/12/2021 C4 D1  -- 4/2/2021 C5 D1   -- She has tolerated chemotherapy fairly well with no high graded toxicities. -- 4/22/2021 CT Chest/A/P reported many of the pulmonary nodules are significantly decreased are no longer present, some of the pulmonary nodules have significantly increased in size since prior examination. This is compatible with a mixed response.  -- 5/28/2021 She has started Pazopanib, dose reducing at 600 mg oral daily. -- 6/25/2021 C9 D1 was deferred d/t low platelets. PLT 30K. Subjective:   Chief complaint: Gluteal mass, sarcoma    History of present illness:  Ms. Braydon Childs is a 77-year-old -American woman who states that about a year ago she noticed a small masslike lesion over her left gluteal area. She did not think much of it. However over the past few months it has started to enlarge in size. She had mass biopsied which pathology reported sarcoma,undifferentiated pleomorphic. She had completed neoadjuvant radiation therapy. Subsequently she underwent resection on 6/11/2020 by Dr. Wade Colby. Her CT at [address] [place] [address] reported metastatic lung lesions. She was seen at [address] [place] [address] and suggested palliative chemotherapy. The patient would like to have therapy at [facility] closer to her house. She has chronic SOB from long-standing COPD. She is oxygen dependent. Today she presented here for follow up. She was accompanied by her family today. She has started  C1D1 Gemcitabine on 1/8/2021. Since 5/28/2021 She has started Pazopanib, dose reducing at 600 mg oral daily. She reported feeling okay today.  She reported some fatigue and decreasing appetite after each therapy but improved to her baseline afterwards. She reported her breathing was stable. She has no other complaints. Denied fever, chills, night sweat, unintentional weight loss, skin lumps or bumps, acute bleeding or bruising issues. Denied headache, acute vision change, dizziness, chest pain, palpitation, productive cough, nausea, vomiting, abdominal pain, altered bowel habits, dysuria, new bone pain or back pain, focal numbness or weakness.        Past Medical History:   Diagnosis Date    Acid reflux     Anxiety     Bipolar 1 disorder (HCC)     Bronchitis     Cirrhosis (HCC)     Coughing     Depression     DVT (deep vein thrombosis) in pregnancy     ETOH abuse     Gastritis     Hepatic encephalopathy (HCC)     Hepatitis     Joint pain     Leukocytosis     Lumbar disc disease     Shortness of breath     Stress     Teeth decayed     Vitamin D deficiency     Weakness of left leg        Allergies   Allergen Reactions    Aspirin Nausea and Vomiting       Past Surgical History:   Procedure Laterality Date    EGD      HAND/FINGER SURGERY UNLISTED      HX BACK SURGERY      HX OVARIAN CYST REMOVAL      HX SPLENECTOMY      IR INSERT TUNL CVC W PORT OVER 5 YEARS  12/8/2020       Social History     Socioeconomic History    Marital status:      Spouse name: Not on file    Number of children: Not on file    Years of education: Not on file    Highest education level: Not on file   Occupational History    Not on file   Tobacco Use    Smoking status: Current Every Day Smoker    Smokeless tobacco: Never Used   Substance and Sexual Activity    Alcohol use: Not Currently    Drug use: Yes     Types: Cocaine, Heroin    Sexual activity: Yes   Other Topics Concern    Not on file   Social History Narrative    Not on file     Social Determinants of Health     Financial Resource Strain:     Difficulty of Paying Living Expenses:    Food Insecurity:  Worried About [facility] in the Last Year:    [address] in the Last Year:    Transportation Needs:     Lack of Transportation (Medical):  Lack of Transportation (Non-Medical):    Physical Activity:     Days of Exercise per Week:     Minutes of Exercise per Session:    Stress:     Feeling of Stress :    Social Connections:     Frequency of Communication with Friends and Family:     Frequency of Social Gatherings with Friends and Family:     Attends Yarsani Services:     Active Member of Clubs or Organizations:     Attends Club or Organization Meetings:     Marital Status:    Intimate Partner Violence:     Fear of Current or Ex-Partner:     Emotionally Abused:     Physically Abused:     Sexually Abused:        Family History   Problem Relation Age of Onset    Heart Disease Mother     Stroke Mother     Heart Disease Father     Heart Disease Brother     Hypertension Brother        Current Outpatient Medications   Medication Sig Dispense Refill    megestroL (MEGACE) 40 mg tablet Take 1 Tablet by mouth daily for 30 days. 30 Tablet 1    PAZOPanib (VOTRIENT) 200 mg tablet Take 3 Tablets by mouth daily. 90 Tablet 3    ondansetron (ZOFRAN ODT) 8 mg disintegrating tablet       Senna Laxative 8.6 mg tablet 1 2 TABS BY MOUTH EVERY NIGHT FOR CONSTIPATION      lidocaine-prilocaine (EMLA) topical cream Apply  to affected area as needed (30-60 minutes before port is to be accessed).  30 g 0    cyclobenzaprine (FLEXERIL) 5 mg tablet       furosemide (LASIX) 40 mg tablet       mirtazapine (REMERON) 15 mg tablet TAKE 1 TABLET BY MOUTH EVERYDAY AT BEDTIME      silver sulfADIAZINE (SILVADENE) 1 % topical cream APPLY TO AFFECTED AREA 3 TIMES A DAY      diclofenac (VOLTAREN) 1 % gel APPLY 4 GM TO LEFT THIGH/BUTTOCKS 4 TIMES A DAY AS NEEDED FOR PAIN      ondansetron hcl (ZOFRAN) 4 mg tablet       QUEtiapine (SEROquel) 50 mg tablet TAKE 1 TABLET BY MOUTH AT BEDTIME      ARIPiprazole (ABILIFY) 5 mg tablet daily.  esomeprazole (NEXIUM) 20 mg capsule TAKE 20 MG BY MOUTH ONCE A DAY.  ADVAIR -21 mcg/actuation inhaler       hydrOXYzine HCl (ATARAX) 25 mg tablet       albuterol-ipratropium (DUO-NEB) 2.5 mg-0.5 mg/3 ml nebu PLEASE SEE ATTACHED FOR DETAILED DIRECTIONS  6    DAILY-GIORGIO tablet TAKE 1 TABLET BY MOUTH EVERY DAY      OLANZapine (ZYPREXA) 10 mg tablet       predniSONE (DELTASONE) 10 mg tablet Take 10 mg by mouth.  albuterol (PROVENTIL HFA, VENTOLIN HFA, PROAIR HFA) 90 mcg/actuation inhaler Take 2 Puffs by inhalation.  buprenorphine-naloxone (SUBOXONE) 8-2 mg film sublingaul film 1 Each by SubLINGual route.  SUBOXONE 8-2 mg film sublingaul film DISSOLVE 1 FILM UNDER THE TONGUE TWICE DAILY  0    buPROPion XL (WELLBUTRIN XL) 150 mg tablet TAKE 1 TABLET BY MOUTH EVERY DAY  3    busPIRone (BUSPAR) 7.5 mg tablet Take 7.5 mg by mouth.  ergocalciferol (ERGOCALCIFEROL) 50,000 unit capsule TAKE 1 CAP BY MOUTH EVERY FRIDAY. 1    folic acid (FOLVITE) 1 mg tablet Take  by mouth daily.  hydrOXYzine pamoate (VISTARIL) 25 mg capsule Take 25 mg by mouth three (3) times daily as needed for Itching.  albuterol 2.5 mg /3 mL (0.083 %) nebu 3 mL, albuterol-ipratropium 2.5 mg-0.5 mg/3 ml nebu 3 mL 1 Dose by Nebulization route.  lactulose (KRISTALOSE) 10 gram packet Take 10 g by mouth three (3) times daily.  montelukast (SINGULAIR) 10 mg tablet Take 10 mg by mouth daily.  naproxen (NAPROSYN) 500 mg tablet Take 500 mg by mouth two (2) times daily (with meals).  omeprazole (PRILOSEC) 40 mg capsule Take 40 mg by mouth daily.  spironolactone (ALDACTONE) 25 mg tablet Take  by mouth daily.  thiamine HCL (VITAMIN B-1) 100 mg tablet Take  by mouth daily.  tiotropium (SPIRIVA WITH HANDIHALER) 18 mcg inhalation capsule Take 1 Cap by inhalation daily. Review of Systems   Constitutional: Positive for malaise/fatigue.  Negative for chills, diaphoresis, fever and weight loss. Respiratory: Positive for shortness of breath (chronic SOB). Negative for cough, hemoptysis and wheezing. Cardiovascular: Negative for chest pain, palpitations and leg swelling. Gastrointestinal: Negative for abdominal pain, diarrhea, heartburn, nausea and vomiting. Genitourinary: Negative for dysuria, frequency, hematuria and urgency. Musculoskeletal: Negative for joint pain and myalgias. Skin: Negative for itching and rash. Neurological: Negative for dizziness, seizures, weakness and headaches. Psychiatric/Behavioral: Negative for depression. The patient does not have insomnia. Objective:     Visit Vitals  BP (!) 122/90 (BP 1 Location: Right upper arm, BP Patient Position: Sitting)   Pulse (!) 101   Temp 98 °F (36.7 °C) (Temporal)   Ht 5' 5\" (1.651 m)   Wt 83.1 kg (183 lb 3.2 oz)   SpO2 99%   BMI 30.49 kg/m²       ECOG Performance Status (grade): 2  0 - able to carry on all pre-disease activity w/out restriction  1 - restricted but able to carry out light work  2 - ambulatory and can self- care but unable to carry out work  3 - bed or chair >50% of waking hours  4 - completely disable, total care, confined to bed or chair    Physical Exam  Constitutional:       Appearance: Normal appearance. Comments: On chronic oxygen   HENT:      Head: Normocephalic and atraumatic. Eyes:      Pupils: Pupils are equal, round, and reactive to light. Cardiovascular:      Rate and Rhythm: Normal rate and regular rhythm. Heart sounds: Normal heart sounds. Pulmonary:      Effort: Pulmonary effort is normal.      Breath sounds: Normal breath sounds. Abdominal:      General: Bowel sounds are normal.      Palpations: Abdomen is soft. Tenderness: There is no abdominal tenderness. There is no guarding. Musculoskeletal:         General: Normal range of motion. Cervical back: Neck supple. Right lower leg: Edema present.       Left lower leg: Edema present. Comments: Left gluteal wound: clean, healing well, no redness or infected. Skin:     General: Skin is warm. Neurological:      General: No focal deficit present. Mental Status: She is alert and oriented to person, place, and time. Mental status is at baseline. Diagnostics:      No results found for this or any previous visit (from the past 96 hour(s)). Imaging:  Results for orders placed in visit on 10/27/20    IR INSERT TUNL CVC W PORT OVER 5 YEARS      No results found for this or any previous visit. Results for orders placed in visit on 11/25/20    CT CHEST ABD PELV W CONT               Assessment:   # Soft tissue mass, left gluteal area:   # Undifferentiated pleomorphic sarcoma  # Lung metastases  # Bone pain, Neulasta-related    Plan:   # Undifferentiated pleomorphic sarcoma  # Lung metastases  # Soft tissue mass, left gluteal area:   -- In February 2019 she had an ultrasound of the area and there was no evidence of a cystic component the area was described as heterogeneous measuring about 6.8 x 4.85 8.5 cm. An attempt to aspirate fluid from the left posterior hip/gluteal mass was unsuccessful. More recently she states that the mass has actually doubled in size and is now about the size of a softball.   -- 4/5/2019. Liver, needle biopsy: mild to moderate chronic hepatitis (NOVA and BELLA score 2-3) with bridging fibrosis (nova and bella score 3); see comment. -- 11/20/2019. Lumbar mass, core biopsies: Soft tissue tumor with features consistent with sarcoma. 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 Necrosis: present. Histologic grade (fnclcc): grade 3.  -- 1/31/2020: CT C/A/P: Significant interval increase in size of the mass within the posterior left buttock which now measures 14.1 cm, compared to 5.1 cm on prior examination. A new indeterminant moderate compression fracture is present in the T5 vertebral body.  Mild scarring is present in the lingula. -- S.p Neoadjuvant XRT. (Theoplis Chain)  -- 6/11/2020 Left gluteal sarcoma resection - Dr. Valentino Mcgraw at [facility] orthopedic oncology department. Preliminary report faxed to us which stated pleomorphic sarcoma s/p resection. She was planned to obtain chest CT in 2 months for interval assessment of lung lesion. -- Her recent CT at [facility] reported metastatic lung lesions. She was seen at [facility] recently and suggested palliative chemotherapy. The patient would like to have therapy at [facility] closer to her house. -- 12/08/2020 s.p Port placement. -- 12/10/2020 CT of chest/A/P: Large left gluteal mass present on prior exam is no longer present. Residual soft tissue and skin changes likely represent scarring. Innumerable new bilateral pulmonary nodules consistent with metastatic disease. There may also be pleural metastasis along the right major fissure inferiorly. Single borderline abnormal left periaortic lymph node unchanged. No new abnormal adenopathy. Stable T5 compression fracture. No new bony abnormalities. -- The patient would like to start her chemotherapy after holidays. -- NGS tumor profiling, liquid biopsy obtained. -- 1/8/2021 C1 D1 Gemcitabine   -- 1/15/2021 C1 D8 Gemcitabine  -- 1/29/2021 C2 D1 Gemcitabine  -- 2/5/2021 C2 D8 Gemcitabine was held d/t low ANC  -- 2/19/2021 C3 D1  -- 3/12/2021 C4 D1  -- 4/2/2021 C5 D1   -- She has tolerated chemotherapy fairly well with no high graded toxicities. -- 4/22/2021 CT Chest/A/P reported many of the pulmonary nodules are significantly decreased are no longer present, some of the pulmonary nodules have significantly increased in size since prior examination. This is compatible with a mixed response.  -- 5/28/2021 She has started Pazopanib, dose reducing at 600 mg oral daily. Today she reported doing stable overall. Denied any early tolerating issues from Pazopanib. -- 6/25/2021 C9 D1 was deferred d/t low platelets.   PLT 30K.   -- Today she reported doing stable, denied bleeding or bruising. Plan:  -- We will continue Gemcitabine, plan to resume C9 D1 on 7/2 tentatively. Dose reducing Gemcitabine since then d/t thrombocytopenia. -- The patient will continue Pazopanib, dose reducing at 600 mg oral daily. She denied current use of proton pump inhibitors or histamine H2 receptor antagonists. -- Monitor labs: CBC, CMP every 2 weeks. The patient will need close monitoring of liver function tests, particularly in the first nine weeks of pazopanib therapy. -- We will see the patient back to the clinic in 4 weeks. Always sooner if required. Gemcitabine:   Days 1 and 8: Gemcitabine 1,000mg/m2 IV over 90 to 120 minutes. Repeat cycle every 3 weeks. Growth factor support. # Thrombocytopenia  -- Likely chemotherapy-related  -- Hold therapy as above  -- Monitor CBC, bleeding      # B/L LE swelling  -- 5/5/2021 Venous doppler reported no DVT demonstrated bilateral lower extremity. # Chronic Anemia  # Anemia, chemotherapy-related  -- She has received PRBCs supportively. -- Will monitor  iron profile, ferritin, b12/folate and replacement accordingly  -- Will consider Procrit if steadily declining H/H    # Poor appetite  -- Ensure BID  -- Advised to keep hydrated. -- Add Megace. Pt refused Nutritionist consult. # Generalized bone pain, likely Neulasta related  -- Advised to take antihistamines such as Claritin or Benadryl or NSAIDs      Orders Placed This Encounter    megestroL (MEGACE) 40 mg tablet     Sig: Take 1 Tablet by mouth daily for 30 days. Dispense:  30 Tablet     Refill:  1           Ms. Stuart Garcia has a reminder for a \"due or due soon\" health maintenance. I have asked that she contact her primary care provider for follow-up on this health maintenance. All of patient's questions answered to their apparent satisfaction. They verbally show understanding and agreement with aforementioned plan.          Josue MEJIA Do, MD  6/29/2021          About 30 minutes were spent for this encounter with more than 50% of the time spent in face-to-face counseling, discussing on diagnosis and management plan going forward, and co-ordination of care. Parts of this document has been produced using Dragon dictation system. Unrecognized errors in transcription may be present. Please do not hesitate to reach out for any questions or clarifications.       CC: Naldo Christianson MD

## 2021-06-28 NOTE — TELEPHONE ENCOUNTER
Patient said she needs a refill of her newest med, but she does not know the name of it. She said it is refilled at the pharmacy here. Asked patient to find out the name of the medication and call us back so we can request a refill. Patient argued we should know which medicine it is and we asked her about it last time she was here. Pt was yelling and angry. Nida Ojeda without the name her refill will be delayed.

## 2021-06-29 NOTE — PROGRESS NOTES
Rebecca Martin presents today for   Chief Complaint   Patient presents with    Follow-up       Is someone accompanying this pt? Yes, Jeanie Gooden son    Is the patient using any DME equipment during OV? Yes, wheelchair    Coordination of Care:  1. Have you been to the ER, urgent care clinic since your last visit? Hospitalized since your last visit? no    2. Have you seen or consulted any other health care providers outside of the [address] [place] [address] since your last visit? Include any pap smears or colon screening.  no

## 2021-07-01 NOTE — PROGRESS NOTES
[facility] Progress Note    Date: 2021    Name: Elian Miller    MRN: [id number]         : 1955    Ms. Alena Brink arrived in the [facility] today at [address] [place] [address], in stable condition, here for [facility]. She was assessed and education was provided. Ms. Cathryn Ordaz vitals were reviewed. Visit Vitals  /69 (BP 1 Location: Right upper arm, BP Patient Position: Sitting)   Pulse (!) 111   Temp 100 °F (37.8 °C)   Resp 20   Ht 5' 5\" (1.651 m)   Wt 84.7 kg (186 lb 12.8 oz)   SpO2 96%   BMI 31.09 kg/m²         Blood for the ordered labs (CBC & CMP) was drawn from her right posterior forearm at 1528, without incident, and all labs were sent out by  to the [facility] hospital lab for processing. Ms. Alena Brink tolerated well and voiced no complaints. Ms. Alena Brink was discharged from [place] [address] in stable condition at 1530. Maritza Schaeffer She is to return on tomorrow, Friday, 21 at 1100, for her next appointment, for her next chemotherapy cycle.      Tennille Rojas RN  2021  4:25 PM

## 2021-07-02 NOTE — PROGRESS NOTES
[facility] Progress Note    Date: 2021    Name: Amelie Tenorio    MRN: [id number]         : 1955    Ms. Cedrick Krishnamurthy arrived in the [facility] today at 1120, in stable condition, here for Cycle 9 Day 8, IV GEMZAR Chemotherapy Infusion (Days 1 & 8, of Every 21 Day Cycle). She was assessed and education was provided. Ms. Shalonda Farah vitals were reviewed. Visit Vitals  /80 (BP 1 Location: Left upper arm, BP Patient Position: Sitting)   Pulse (!) 105   Temp 98.2 °F (36.8 °C)   Resp 20   SpO2 96%         Her right chest single lumen port was accessed without incident at 1135, and brisk blood return was obtained. Blood for the ordered CBC was drawn, and was processed on site. All results listed below were noted to be satisfactory for treatment today. Results for Beltran Alatorre (MRN [id number])    Ref. Range 2021 15:28   WBC Latest Ref Range: 4.6 - 13.2 K/uL 6.0   RBC Latest Ref Range: 4.20 - 5.30 M/uL 3.07 (L)   HGB Latest Ref Range: 12.0 - 16.0 g/dL 10.3 (L)   HCT Latest Ref Range: 35.0 - 45.0 % 32.4 (L)   MCV Latest Ref Range: 74.0 - 97.0 .5 (H)   MCH Latest Ref Range: 24.0 - 34.0 PG 33.6   MCHC Latest Ref Range: 31.0 - 37.0 g/dL 31.8   RDW Latest Ref Range: 11.6 - 14.5 % 21.0 (H)   PLATELET Latest Ref Range: 135 - 420 K/uL 813 (H)   MPV Latest Ref Range: 9.2 - 11.8 FL 9.2   NEUTROPHILS Latest Ref Range: 40 - 73 % 61   LYMPHOCYTES Latest Ref Range: 21 - 52 % 16 (L)   MONOCYTES Latest Ref Range: 3 - 10 % 18 (H)   EOSINOPHILS Latest Ref Range: 0 - 5 % 5   BASOPHILS Latest Ref Range: 0 - 2 % 1   DF Latest Units:   AUTOMATED   ABS. NEUTROPHILS Latest Ref Range: 1.8 - 8.0 K/UL 3.6   ABS. LYMPHOCYTES Latest Ref Range: 0.9 - 3.6 K/UL 1.0   ABS. MONOCYTES Latest Ref Range: 0.05 - 1.2 K/UL 1.1   ABS. EOSINOPHILS Latest Ref Range: 0.0 - 0.4 K/UL 0.3   ABS.  BASOPHILS Latest Ref Range: 0.0 - 0.1 K/UL 0.0   Sodium Latest Ref Range: 136 - 145 mmol/L 138   Potassium Latest Ref Range: 3.5 - 5.5 mmol/L 5.0 Chloride Latest Ref Range: 100 - 111 mmol/L 100   CO2 Latest Ref Range: 21 - 32 mmol/L 33 (H)   Anion gap Latest Ref Range: 3.0 - 18 mmol/L 5   Glucose Latest Ref Range: 74 - 99 mg/dL 132 (H)   BUN Latest Ref Range: 7.0 - 18 MG/DL 8   Creatinine Latest Ref Range: 0.6 - 1.3 MG/DL 0.82   BUN/Creatinine ratio Latest Ref Range: 12 - 20   10 (L)   Calcium Latest Ref Range: 8.5 - 10.1 MG/DL 7.9 (L)   GFR est non-AA Latest Ref Range: >60 ml/min/1.73m2 >60   GFR est AA Latest Ref Range: >60 ml/min/1.73m2 >60   Bilirubin, total Latest Ref Range: 0.2 - 1.0 MG/DL 0.8   Protein, total Latest Ref Range: 6.4 - 8.2 g/dL 7.7   Albumin Latest Ref Range: 3.4 - 5.0 g/dL 2.4 (L)   Globulin Latest Ref Range: 2.0 - 4.0 g/dL 5.3 (H)   A-G Ratio Latest Ref Range: 0.8 - 1.7   0.5 (L)   ALT Latest Ref Range: 13 - 56 U/L 26   AST Latest Ref Range: 10 - 38 U/L 38   Alk. phosphatase Latest Ref Range: 45 - 117 U/L 162 (H)             250 ml IV Bag NS was initiated to infuse @ KVO PRN throughout treatment today. The following pre-medication was administered per order and without incident: Zofran 8 mg ODT. The following chemotherapy medication was administered: Gemcitabine (GEMZAR) 1,300 mg IV (900 mg/m2), was administered over approximately 90 minutes, per order, and without incident. After completion of all ordered IV medications, her port was flushed very well per protocol with NS & Heparin, and then the heath needle was removed and gauze/bandaid was applied. Ms. Guy Del Real tolerated her treatment very well today, and voiced no complaints. Ms. Guy Del Real was discharged from [place] [address] in stable condition at 1440. She is scheduled to return on Tuesday, 7-20-21 at 1400 for pre-chemo labs. And then cycle 10, day 1, Gemzar chemotherapy is scheduled for Wednesday, 7-23-21 at 1100.     Maris Fink RN  July 2, 2021  11:58 AM

## 2021-07-20 NOTE — PROGRESS NOTES
[facility] Pre-Chemo Lab Visit:    sOcar Nelson  1955  [id number]    0008: Pt arrived via wheelchair. Labs drawn via left dorsal forearm venipuncture x1 attempt. Pt tolerated well without complaints. Gauze/ coban to site. Pt departed [facility] ambulatory and in no distress at 1345. Pt is scheduled to return for her next appointment on 7/23/21 at 1100.     Visit Vitals  BP (!) 163/91 (BP 1 Location: Left upper arm, BP Patient Position: Sitting)   Pulse 79   Temp 98.3 °F (36.8 °C)   Resp 20   Ht 5' 5\" (1.651 m)   Wt 82.4 kg (181 lb 9.6 oz)   SpO2 100%   BMI 30.22 kg/m²

## 2021-07-20 NOTE — PROGRESS NOTES
Pharmacy Note     Name: Jane Bergeron  : 1955  Estimated body surface area is 1.94 meters squared as calculated from the following:    Height as of 21: 165.1 cm (65\"). Weight as of 21: 82.4 kg (181 lb 9.6 oz). Diagnosis: Undifferentiated pleomorphic sarcoma  Treatment Plan: Gemzar  Cycle/Day: C10D1  Cycle Start Date: 2021    Lab Results   Component Value Date/Time    WBC 7.2 2021 01:40 PM    PLATELET 867 (H)  01:40 PM     Lab Results   Component Value Date/Time    ABS.  NEUTROPHILS 5.1 2021 01:40 PM     Lab Results   Component Value Date/Time    Creatinine 0.82 2021 03:28 PM     Most Recent Creatinine Clearance:  CrCl: 69.6 mL/min (based on Adjusted Body Weight)  Creatinine: 0.82 mg/dL (2021)      Pharmacy Intervention:  Cycle 9 Day 8 held due to tachycardia and edema  Discussed with Dr. Spike Devlin to proceed with Cycle 10 Day 1 on 2021 if symptoms have improved  Plan updated in [place] and approved by Dr. Josi Avery will continue to monitor    Pharmacist: Dennis Singer, [facility]

## 2021-07-23 NOTE — PROGRESS NOTES
[facility] Progress Note    Date: 2021    Name: Hernan Reardon    MRN: [id number]         : 1955    Ms. Guy Del Real arrived in the [facility] today at 1200, in stable condition, here for Cycle 10 Day 1, IV GEMZAR Chemotherapy Infusion (Days 1 & 8, of Every 21 Day Cycle). She was assessed and education was provided. Ms. Shalini Khan vitals were reviewed. Visit Vitals  BP (!) 165/89 (BP 1 Location: Right upper arm, BP Patient Position: Sitting)   Pulse 93   Temp 97.5 °F (36.4 °C)   Resp 20   SpO2 95%         Her right chest single lumen port was accessed without incident  and brisk blood return was obtained. All results listed below were noted to be satisfactory for treatment today. Results for Mynor Anguiano (MRN [id number])   Ref. Range 2021 13:40   WBC Latest Ref Range: 4.6 - 13.2 K/uL 7.2   RBC Latest Ref Range: 4.20 - 5.30 M/uL 3.62 (L)   HGB Latest Ref Range: 12.0 - 16.0 g/dL 12.0   HCT Latest Ref Range: 35.0 - 45.0 % 37.2   MCV Latest Ref Range: 74.0 - 97.0 .8 (H)   MCH Latest Ref Range: 24.0 - 34.0 PG 33.1   MCHC Latest Ref Range: 31.0 - 37.0 g/dL 32.3   RDW Latest Ref Range: 11.6 - 14.5 % 19.8 (H)   PLATELET Latest Ref Range: 135 - 420 K/uL 530 (H)   MPV Latest Ref Range: 9.2 - 11.8 FL 8.8 (L)   NEUTROPHILS Latest Ref Range: 40 - 73 % 71   LYMPHOCYTES Latest Ref Range: 21 - 52 % 14 (L)   MONOCYTES Latest Ref Range: 3 - 10 % 12 (H)   EOSINOPHILS Latest Ref Range: 0 - 5 % 2   BASOPHILS Latest Ref Range: 0 - 2 % 1   DF Latest Units:   AUTOMATED   ABS. NEUTROPHILS Latest Ref Range: 1.8 - 8.0 K/UL 5.1   ABS. LYMPHOCYTES Latest Ref Range: 0.9 - 3.6 K/UL 1.0   ABS. MONOCYTES Latest Ref Range: 0.05 - 1.2 K/UL 0.8   ABS. EOSINOPHILS Latest Ref Range: 0.0 - 0.4 K/UL 0.1   ABS.  BASOPHILS Latest Ref Range: 0.0 - 0.1 K/UL 0.1   Sodium Latest Ref Range: 136 - 145 mmol/L 136   Potassium Latest Ref Range: 3.5 - 5.5 mmol/L 4.7   Chloride Latest Ref Range: 100 - 111 mmol/L 100   CO2 Latest Ref Range: 21 - 32 mmol/L 29   Anion gap Latest Ref Range: 3.0 - 18 mmol/L 7   Glucose Latest Ref Range: 74 - 99 mg/dL 111 (H)   BUN Latest Ref Range: 7.0 - 18 MG/DL 17   Creatinine Latest Ref Range: 0.6 - 1.3 MG/DL 0.84   BUN/Creatinine ratio Latest Ref Range: 12 - 20   20   Calcium Latest Ref Range: 8.5 - 10.1 MG/DL 8.1 (L)   GFR est non-AA Latest Ref Range: >60 ml/min/1.73m2 >60   GFR est AA Latest Ref Range: >60 ml/min/1.73m2 >60   Bilirubin, total Latest Ref Range: 0.2 - 1.0 MG/DL 0.8   Protein, total Latest Ref Range: 6.4 - 8.2 g/dL 8.3 (H)   Albumin Latest Ref Range: 3.4 - 5.0 g/dL 2.4 (L)   Globulin Latest Ref Range: 2.0 - 4.0 g/dL 5.9 (H)   A-G Ratio Latest Ref Range: 0.8 - 1.7   0.4 (L)   ALT Latest Ref Range: 13 - 56 U/L 36   AST Latest Ref Range: 10 - 38 U/L 72 (H)   Alk. phosphatase Latest Ref Range: 45 - 117 U/L 186 (H)                 250 ml IV Bag NS was initiated to infuse @ KVO PRN throughout treatment today. The following pre-medication was administered per order and without incident: Zofran 8 mg ODT. The following chemotherapy medication was administered: Gemcitabine (GEMZAR) 1,300 mg IV (900 mg/m2), was administered over approximately 90 minutes, per order, and without incident. After completion of all ordered IV medications, her port was flushed very well per protocol with NS & Heparin, and then the heath needle was removed and gauze/bandaid was applied. Ms. Michael Looney tolerated her treatment very well today, and voiced no complaints. Ms. Michael Looney was discharged from [place] [address] in stable condition at 1505. She is scheduled to return on Tuesday, 7-20-21 at 1400 for pre-chemo labs. And then cycle 10, day 8, Gemzar chemotherapy is scheduled for Wednesday, 7-30-21 at 1100.     Lise Lemus RN  July 23, 2021  11:58 AM

## 2021-07-27 NOTE — PROGRESS NOTES
Hematology/Oncology Note    Name: Amelie Tenorio  Date: 2021  : 1955    Keiry Almonte MD       Oncology History:  Ms. Cedrick Krishnamurthy  is a 74 y.o. -American woman who has follow-up for her left gluteal mass. -- In 2019 she had an ultrasound of the area and there was no evidence of a cystic component the area was described as heterogeneous measuring about 6.8 x 4.85 8.5 cm. An attempt to aspirate fluid from the left posterior hip/gluteal mass was unsuccessful. More recently she states that the mass has actually doubled in size and is now about the size of a softball.   -- 2019. Liver, needle biopsy: mild to moderate chronic hepatitis (NOVA and MATI score 2-3) with bridging fibrosis (nova and mati score 3); see comment. -- 2019. Lumbar mass, core biopsies: Soft tissue tumor with features consistent with sarcoma. 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 Necrosis: present. Histologic grade (fnclcc): grade 3.  -- 2020: CT C/A/P: Significant interval increase in size of the mass within the posterior left buttock which now measures 14.1 cm, compared to 5.1 cm on prior examination. A new indeterminant moderate compression fracture is present in the T5 vertebral body.  -- S.p Neoadjuvant XRT. (Nigel Huger)  -- 2020 Left gluteal sarcoma resection  -- CT at [facility] reported metastatic lung lesions. She was seen at [facility] recently and suggested palliative chemotherapy. The patient would like to have therapy at [facility] closer to her house. -- 2020 s.p Port placement. -- 12/10/2020 CT of chest/A/P: Large left gluteal mass present on prior exam is no longer present. Residual soft tissue and skin changes likely represent scarring. Innumerable new bilateral pulmonary nodules consistent with metastatic disease. There may also be pleural metastasis along the right major fissure inferiorly. Single borderline abnormal left periaortic lymph node unchanged. No new abnormal adenopathy. Stable T5 compression fracture. No new bony abnormalities. -- The patient would like to start her chemotherapy after holidays. -- NGS tumor profiling, liquid biopsy obtained. -- 1/8/2021 C1 D1 Gemcitabine   -- 1/15/2021 C1 D8 Gemcitabine  -- 1/29/2021 C2 D1 Gemcitabine  -- 2/5/2021 C2 D8 Gemcitabine was held d/t low ANC  -- 2/19/2021 C3 D1  -- 3/12/2021 C4 D1  -- 4/2/2021 C5 D1   -- She has tolerated chemotherapy fairly well with no high graded toxicities. -- 4/22/2021 CT Chest/A/P reported many of the pulmonary nodules are significantly decreased are no longer present, some of the pulmonary nodules have significantly increased in size since prior examination. This is compatible with a mixed response.  -- 5/28/2021 She has started Pazopanib, dose reducing at 600 mg oral daily. -- 6/25/2021 C9 D1 was deferred d/t low platelets. PLT 30K. Subjective:   Chief complaint: Gluteal mass, sarcoma    History of present illness:  Ms. Aurea Nelson is a 77-year-old -American woman who states that about a year ago she noticed a small masslike lesion over her left gluteal area. She did not think much of it. However over the past few months it has started to enlarge in size. She had mass biopsied which pathology reported sarcoma,undifferentiated pleomorphic. She had completed neoadjuvant radiation therapy. Subsequently she underwent resection on 6/11/2020 by Dr. Karlene Leonard. Her CT at [facility] reported metastatic lung lesions. She was seen at [facility] and suggested palliative chemotherapy. The patient would like to have therapy at [facility] closer to her house. She has chronic SOB from long-standing COPD. She is oxygen dependent. She has started  C1D1 Gemcitabine on 1/8/2021. Since 5/28/2021 She has started Pazopanib, dose reducing at 600 mg oral daily. Today she presented here for follow up. She was accompanied by her family today. She reported feeling fine today.  She reported some fatigue and decreasing appetite after each therapy but improved to her baseline afterwards. She reported her breathing was stable. She reported skin blisters over left hand and right toe and therefore she has held Pazopanib for past 4 days. No active bleeding or drainage. She has no other complaints. Denied fever, chills, night sweat, unintentional weight loss, skin lumps or bumps, acute bleeding or bruising issues. Denied headache, acute vision change, dizziness, chest pain, palpitation, productive cough, nausea, vomiting, abdominal pain, altered bowel habits, dysuria, new bone pain or back pain, focal numbness or weakness.            Past Medical History:   Diagnosis Date    Acid reflux     Anxiety     Bipolar 1 disorder (HCC)     Bronchitis     Cirrhosis (HCC)     Coughing     Depression     DVT (deep vein thrombosis) in pregnancy     ETOH abuse     Gastritis     Hepatic encephalopathy (HCC)     Hepatitis     Joint pain     Leukocytosis     Lumbar disc disease     Shortness of breath     Stress     Teeth decayed     Vitamin D deficiency     Weakness of left leg        Allergies   Allergen Reactions    Aspirin Nausea and Vomiting       Past Surgical History:   Procedure Laterality Date    EGD      HAND/FINGER SURGERY UNLISTED      HX BACK SURGERY      HX OVARIAN CYST REMOVAL      HX SPLENECTOMY      IR INSERT TUNL CVC W PORT OVER 5 YEARS  12/8/2020       Social History     Socioeconomic History    Marital status:      Spouse name: Not on file    Number of children: Not on file    Years of education: Not on file    Highest education level: Not on file   Occupational History    Not on file   Tobacco Use    Smoking status: Current Every Day Smoker    Smokeless tobacco: Never Used   Substance and Sexual Activity    Alcohol use: Not Currently    Drug use: Yes     Types: Cocaine, Heroin    Sexual activity: Yes   Other Topics Concern    Not on file   Social History Narrative    Not on file     Social Determinants of Health     Financial Resource Strain:     Difficulty of Paying Living Expenses:    Food Insecurity:     Worried About Running Out of Food in the Last Year:     [address] in the Last Year:    Transportation Needs:     Lack of Transportation (Medical):  Lack of Transportation (Non-Medical):    Physical Activity:     Days of Exercise per Week:     Minutes of Exercise per Session:    Stress:     Feeling of Stress :    Social Connections:     Frequency of Communication with Friends and Family:     Frequency of Social Gatherings with Friends and Family:     Attends [employer] witness Services:     Active Member of Clubs or Organizations:     Attends Club or Organization Meetings:     Marital Status:    Intimate Partner Violence:     Fear of Current or Ex-Partner:     Emotionally Abused:     Physically Abused:     Sexually Abused:        Family History   Problem Relation Age of Onset    Heart Disease Mother     Stroke Mother     Heart Disease Father     Heart Disease Brother     Hypertension Brother        Current Outpatient Medications   Medication Sig Dispense Refill    Klor-Con M10 10 mEq tablet TAKE 1 TABLET BY MOUTH DAILY FOR 15 DAYS. 15 Tablet 0    furosemide (LASIX) 20 mg tablet TAKE 1 TABLET BY MOUTH EVERY DAY 15 Tablet 0    megestroL (MEGACE) 40 mg tablet TAKE 1 TABLET BY MOUTH EVERY DAY 30 Tablet 1    PAZOPanib (VOTRIENT) 200 mg tablet Take 3 Tablets by mouth daily. 90 Tablet 3    ondansetron (ZOFRAN ODT) 8 mg disintegrating tablet       Senna Laxative 8.6 mg tablet 1 2 TABS BY MOUTH EVERY NIGHT FOR CONSTIPATION      lidocaine-prilocaine (EMLA) topical cream Apply  to affected area as needed (30-60 minutes before port is to be accessed).  30 g 0    cyclobenzaprine (FLEXERIL) 5 mg tablet       mirtazapine (REMERON) 15 mg tablet TAKE 1 TABLET BY MOUTH EVERYDAY AT BEDTIME      silver sulfADIAZINE (SILVADENE) 1 % topical cream APPLY TO AFFECTED AREA 3 TIMES A DAY      diclofenac (VOLTAREN) 1 % gel APPLY 4 GM TO LEFT THIGH/BUTTOCKS 4 TIMES A DAY AS NEEDED FOR PAIN      ondansetron hcl (ZOFRAN) 4 mg tablet       QUEtiapine (SEROquel) 50 mg tablet TAKE 1 TABLET BY MOUTH AT BEDTIME      ARIPiprazole (ABILIFY) 5 mg tablet daily.  esomeprazole (NEXIUM) 20 mg capsule TAKE 20 MG BY MOUTH ONCE A DAY.  ADVAIR -21 mcg/actuation inhaler       hydrOXYzine HCl (ATARAX) 25 mg tablet       albuterol-ipratropium (DUO-NEB) 2.5 mg-0.5 mg/3 ml nebu PLEASE SEE ATTACHED FOR DETAILED DIRECTIONS  6    DAILY-GIORGIO tablet TAKE 1 TABLET BY MOUTH EVERY DAY      OLANZapine (ZYPREXA) 10 mg tablet       predniSONE (DELTASONE) 10 mg tablet Take 10 mg by mouth.  albuterol (PROVENTIL HFA, VENTOLIN HFA, PROAIR HFA) 90 mcg/actuation inhaler Take 2 Puffs by inhalation.  buprenorphine-naloxone (SUBOXONE) 8-2 mg film sublingaul film 1 Each by SubLINGual route.  SUBOXONE 8-2 mg film sublingaul film DISSOLVE 1 FILM UNDER THE TONGUE TWICE DAILY  0    buPROPion XL (WELLBUTRIN XL) 150 mg tablet TAKE 1 TABLET BY MOUTH EVERY DAY  3    busPIRone (BUSPAR) 7.5 mg tablet Take 7.5 mg by mouth.  ergocalciferol (ERGOCALCIFEROL) 50,000 unit capsule TAKE 1 CAP BY MOUTH EVERY FRIDAY. 1    folic acid (FOLVITE) 1 mg tablet Take  by mouth daily.  hydrOXYzine pamoate (VISTARIL) 25 mg capsule Take 25 mg by mouth three (3) times daily as needed for Itching.  albuterol 2.5 mg /3 mL (0.083 %) nebu 3 mL, albuterol-ipratropium 2.5 mg-0.5 mg/3 ml nebu 3 mL 1 Dose by Nebulization route.  lactulose (KRISTALOSE) 10 gram packet Take 10 g by mouth three (3) times daily.  montelukast (SINGULAIR) 10 mg tablet Take 10 mg by mouth daily.  naproxen (NAPROSYN) 500 mg tablet Take 500 mg by mouth two (2) times daily (with meals).  omeprazole (PRILOSEC) 40 mg capsule Take 40 mg by mouth daily.       spironolactone (ALDACTONE) 25 mg tablet Take by mouth daily.  thiamine HCL (VITAMIN B-1) 100 mg tablet Take  by mouth daily.  tiotropium (SPIRIVA WITH HANDIHALER) 18 mcg inhalation capsule Take 1 Cap by inhalation daily. Review of Systems   Constitutional: Positive for malaise/fatigue. Negative for chills, diaphoresis, fever and weight loss. Respiratory: Positive for shortness of breath (chronic SOB). Negative for cough, hemoptysis and wheezing. Cardiovascular: Negative for chest pain, palpitations and leg swelling. Gastrointestinal: Negative for abdominal pain, diarrhea, heartburn, nausea and vomiting. Genitourinary: Negative for dysuria, frequency, hematuria and urgency. Musculoskeletal: Negative for joint pain and myalgias. Skin: Negative for itching and rash. skin blisters over left hand and right toe   Neurological: Negative for dizziness, seizures, weakness and headaches. Psychiatric/Behavioral: Negative for depression. The patient does not have insomnia. Objective:     Visit Vitals  /88   Pulse (!) 105   Temp 98.6 °F (37 °C)   Ht 5' 5\" (1.651 m)   Wt 80.3 kg (177 lb)   SpO2 99%   BMI 29.45 kg/m²       ECOG Performance Status (grade): 2  0 - able to carry on all pre-disease activity w/out restriction  1 - restricted but able to carry out light work  2 - ambulatory and can self- care but unable to carry out work  3 - bed or chair >50% of waking hours  4 - completely disable, total care, confined to bed or chair    Physical Exam  Constitutional:       Appearance: Normal appearance. Comments: On chronic oxygen   HENT:      Head: Normocephalic and atraumatic. Eyes:      Pupils: Pupils are equal, round, and reactive to light. Cardiovascular:      Rate and Rhythm: Normal rate and regular rhythm. Heart sounds: Normal heart sounds. Pulmonary:      Effort: Pulmonary effort is normal.      Breath sounds: Normal breath sounds.    Abdominal:      General: Bowel sounds are normal.      Palpations: Abdomen is soft. Tenderness: There is no abdominal tenderness. There is no guarding. Musculoskeletal:         General: Normal range of motion. Cervical back: Neck supple. Right lower leg: Edema present. Left lower leg: Edema present. Skin:     General: Skin is warm. Comments: skin blisters over left hand and right toe   Neurological:      General: No focal deficit present. Mental Status: She is alert and oriented to person, place, and time. Mental status is at baseline. Diagnostics:      No results found for this or any previous visit (from the past 96 hour(s)). Imaging:  Results for orders placed in visit on 10/27/20    IR INSERT TUNL CVC W PORT OVER 5 YEARS      No results found for this or any previous visit. Results for orders placed in visit on 04/02/21    CT CHEST ABD PELV W WO CONT               Assessment:   # Soft tissue mass, left gluteal area:   # Undifferentiated pleomorphic sarcoma  # Lung metastases  # Bone pain, Neulasta-related    Plan:   # Undifferentiated pleomorphic sarcoma  # Lung metastases  # Soft tissue mass, left gluteal area:   -- In February 2019 she had an ultrasound of the area and there was no evidence of a cystic component the area was described as heterogeneous measuring about 6.8 x 4.85 8.5 cm. An attempt to aspirate fluid from the left posterior hip/gluteal mass was unsuccessful. More recently she states that the mass has actually doubled in size and is now about the size of a softball.   -- 4/5/2019. Liver, needle biopsy: mild to moderate chronic hepatitis (NOVA and BELLA score 2-3) with bridging fibrosis (nova and bella score 3); see comment. -- 11/20/2019. Lumbar mass, core biopsies: Soft tissue tumor with features consistent with sarcoma. 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 Necrosis: present.  Histologic grade (fnclcc): grade 3.  -- 1/31/2020: CT C/A/P: Significant interval increase in size of the mass within the posterior left buttock which now measures 14.1 cm, compared to 5.1 cm on prior examination. A new indeterminant moderate compression fracture is present in the T5 vertebral body. Mild scarring is present in the lingula. -- S.p Neoadjuvant XRT. (Ray Rowley)  -- 6/11/2020 Left gluteal sarcoma resection - Dr. Satish Garcia at [facility] orthopedic oncology department. Preliminary report faxed to us which stated pleomorphic sarcoma s/p resection. She was planned to obtain chest CT in 2 months for interval assessment of lung lesion. -- Her recent CT at [facility] reported metastatic lung lesions. She was seen at [facility] recently and suggested palliative chemotherapy. The patient would like to have therapy at [facility] closer to her house. -- 12/08/2020 s.p Port placement. -- 12/10/2020 CT of chest/A/P: Large left gluteal mass present on prior exam is no longer present. Residual soft tissue and skin changes likely represent scarring. Innumerable new bilateral pulmonary nodules consistent with metastatic disease. There may also be pleural metastasis along the right major fissure inferiorly. Single borderline abnormal left periaortic lymph node unchanged. No new abnormal adenopathy. Stable T5 compression fracture. No new bony abnormalities. -- The patient would like to start her chemotherapy after holidays. -- NGS tumor profiling, liquid biopsy obtained. -- 1/8/2021 C1 D1 Gemcitabine   -- 1/15/2021 C1 D8 Gemcitabine  -- 1/29/2021 C2 D1 Gemcitabine  -- 2/5/2021 C2 D8 Gemcitabine was held d/t low ANC  -- 2/19/2021 C3 D1  -- 3/12/2021 C4 D1  -- 4/2/2021 C5 D1   -- She has tolerated chemotherapy fairly well with no high graded toxicities. -- 4/22/2021 CT Chest/A/P reported many of the pulmonary nodules are significantly decreased are no longer present, some of the pulmonary nodules have significantly increased in size since prior examination.  This is compatible with a mixed response.  -- 5/28/2021 She has started Pazopanib, dose reducing at 600 mg oral daily. -- 6/25/2021 C9 D1 Gemcitabine was deferred d/t low platelets. PLT 30K.   -- Today prechemo labs: 7/30/2021 CBC reported hemoglobin 10.5, hematocrit 32.8%, WBC 2.8, ANC 0.8, platelet 278,593. Plan:  -- We will defer her Gemcitabine today d/t neutropenia  -- The patient will continue to hold Pazopanib due to skin blisters  -- Monitor labs: CBC, CMP every 2 weeks. The patient will need close monitoring of liver function tests, particularly in the first nine weeks of pazopanib therapy. -- We will see the patient back to the clinic in 2 weeks. Always sooner if required. Gemcitabine:   Days 1 and 8: Gemcitabine 1,000mg/m2 IV over 90 to 120 minutes. Repeat cycle every 3 weeks. Growth factor support. # Skin blisters  -- Concerned Pazopanib side effects  -- Hold Pazopanib for now. -- Prescribed short term Prednisone PO and Erythromycin topical.      # Thrombocytopenia  -- Likely chemotherapy-related  -- Hold therapy as above  -- Monitor CBC, bleeding      # B/L LE swelling  -- 5/5/2021 Venous doppler reported no DVT demonstrated bilateral lower extremity. -- Stable      # Chronic Anemia  # Anemia, chemotherapy-related  -- She has received PRBCs supportively. -- Will monitor  iron profile, ferritin, b12/folate and replacement accordingly  -- Will consider Procrit if steadily declining H/H      # Poor appetite  -- Ensure BID  -- Advised to keep hydrated. -- Add Megace. Pt refused Nutritionist consult. # Generalized bone pain, likely Neulasta related  -- Advised to take antihistamines such as Claritin or Benadryl or NSAIDs      No orders of the defined types were placed in this encounter. Ms. Talon Ge has a reminder for a \"due or due soon\" health maintenance. I have asked that she contact her primary care provider for follow-up on this health maintenance. All of patient's questions answered to their apparent satisfaction.  They verbally show understanding and agreement with aforementioned plan. Cedric Yan MD  7/30/2021          About 30 minutes were spent for this encounter with more than 50% of the time spent in face-to-face counseling, discussing on diagnosis and management plan going forward, and co-ordination of care. Parts of this document has been produced using Dragon dictation system. Unrecognized errors in transcription may be present. Please do not hesitate to reach out for any questions or clarifications.       CC: Radha Schofield MD

## 2021-07-30 NOTE — PROGRESS NOTES
[facility] Progress Note    Date: 2021    Name: Mi Crawford    MRN: [id number]         : 1955      Ms. Benjamín Pacheco was assessed and education was provided. Ms. Lazarus Mill vitals were reviewed and patient was observed for 5 minutes prior to treatment. Visit Vitals  /78 (BP 1 Location: Right upper arm, BP Patient Position: Sitting)   Pulse 99   Temp 98.6 °F (37 °C)   Resp 20   SpO2 98%       Lab results were obtained and reviewed. Recent Results (from the past 12 hour(s))   CBC WITH 3 PART DIFF    Collection Time: 21 11:36 AM   Result Value Ref Range    WBC 2.2 (L) 4.5 - 13.0 K/uL    RBC 3.17 (L) 4.10 - 5.10 M/uL    HGB 10.6 (L) 12.0 - 16 g/dL    HCT 33.2 (L) 36 - 48 %    .7 (H) 78 - 102 FL    MCH 33.4 25.0 - 35.0 PG    MCHC 31.9 31 - 37 g/dL    RDW 19.0 (H) 11.5 - 14.5 %    NEUTROPHILS 39 (L) 40 - 70 %    MIXED CELLS 12 0.1 - 17 %    LYMPHOCYTES 48 (H) 14 - 44 %    ABS. NEUTROPHILS 0.8 (L) 1.8 - 9.5 K/UL    ABS. MIXED CELLS 0.3 0.0 - 2.3 K/uL    ABS. LYMPHOCYTES 1.1 1.1 - 5.9 K/UL    DF AUTOMATED       CBC done in house. Chemo held per Dr Mandy Wheatley for ANC 0.8 and 0.9. This dose will not be made up per Dr Mandy Wheatley. Pt is to return for next regularly scheduled chemo cycle. Platelets 082 and 848 both flagged. Preliminar;y CBC results scanned into chart and blood sent to hospital for retesting. Neulasta OBI also held pre Dr Mandy Wheatley. Discharge instructions discussed with Ms Benjamín Pacheco and she verbalized understanding. Ms. Benjamín Pacheco tolerated the infusion, and had no complaints. Patient armband removed and shredded. Ms. Benjamín Pacheco was discharged from [place] [id number] in stable condition at 1225. She is to return on 8/10/2021 at 1300 for her next appointment.     Kate Travis RN  2021  2:38 PM

## 2021-08-13 NOTE — PROGRESS NOTES
Hematology/Oncology Note    Name: Lisa Canada  Date: 2021  : 1955    Morena Gonzalez MD       Oncology History:  Ms. Vero Sung  is a 74 y.o. -American woman who has follow-up for her left gluteal mass. -- In 2019 she had an ultrasound of the area and there was no evidence of a cystic component the area was described as heterogeneous measuring about 6.8 x 4.85 8.5 cm. An attempt to aspirate fluid from the left posterior hip/gluteal mass was unsuccessful. More recently she states that the mass has actually doubled in size and is now about the size of a softball.   -- 2019. Liver, needle biopsy: mild to moderate chronic hepatitis (NOVA and MATI score 2-3) with bridging fibrosis (nova and mati score 3); see comment. -- 2019. Lumbar mass, core biopsies: Soft tissue tumor with features consistent with sarcoma. 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 Necrosis: present. Histologic grade (fnclcc): grade 3.  -- 2020: CT C/A/P: Significant interval increase in size of the mass within the posterior left buttock which now measures 14.1 cm, compared to 5.1 cm on prior examination. A new indeterminant moderate compression fracture is present in the T5 vertebral body.  -- S.p Neoadjuvant XRT. (Robert Govea)  -- 2020 Left gluteal sarcoma resection  -- CT at [facility] reported metastatic lung lesions. She was seen at [facility] recently and suggested palliative chemotherapy. The patient would like to have therapy at [facility] closer to her house. -- 2020 s.p Port placement. -- 12/10/2020 CT of chest/A/P: Large left gluteal mass present on prior exam is no longer present. Residual soft tissue and skin changes likely represent scarring. Innumerable new bilateral pulmonary nodules consistent with metastatic disease. There may also be pleural metastasis along the right major fissure inferiorly. Single borderline abnormal left periaortic lymph node unchanged. No new abnormal adenopathy. Stable T5 compression fracture. No new bony abnormalities. -- The patient would like to start her chemotherapy after holidays. -- NGS tumor profiling, liquid biopsy obtained. -- 1/8/2021 C1 D1 Gemcitabine   -- 1/15/2021 C1 D8 Gemcitabine  -- 1/29/2021 C2 D1 Gemcitabine  -- 2/5/2021 C2 D8 Gemcitabine was held d/t low ANC  -- 2/19/2021 C3 D1  -- 3/12/2021 C4 D1  -- 4/2/2021 C5 D1   -- She has tolerated chemotherapy fairly well with no high graded toxicities. -- 4/22/2021 CT Chest/A/P reported many of the pulmonary nodules are significantly decreased are no longer present, some of the pulmonary nodules have significantly increased in size since prior examination. This is compatible with a mixed response.  -- 5/28/2021 She has started Pazopanib, dose reducing at 600 mg oral daily. -- 6/25/2021 C9 D1 was deferred d/t low platelets. PLT 30K. Subjective:   Chief complaint: Gluteal mass, sarcoma    History of present illness:  Ms. Suresh Oliver is a 77-year-old -American woman who states that about a year ago she noticed a small masslike lesion over her left gluteal area. She did not think much of it. However over the past few months it has started to enlarge in size. She had mass biopsied which pathology reported sarcoma,undifferentiated pleomorphic. She had completed neoadjuvant radiation therapy. Subsequently she underwent resection on 6/11/2020 by Dr. Gregoria Reid. Her CT at [facility] reported metastatic lung lesions. She was seen at [facility] and suggested palliative chemotherapy. The patient would like to have therapy at [facility] closer to her house. She has chronic SOB from long-standing COPD. She is oxygen dependent. She has started  C1D1 Gemcitabine on 1/8/2021. Since 5/28/2021 She has started Pazopanib, dose reducing at 600 mg oral daily. Today she presented here for follow up. She was accompanied by her family today. She reported feeling better today.  She reported her breathing was stable. She reported skin blisters over left hand and right toe have improved with steroids and creams. She has no other complaints. Denied fever, chills, night sweat, unintentional weight loss, skin lumps or bumps, acute bleeding or bruising issues. Denied headache, acute vision change, dizziness, chest pain, palpitation, productive cough, nausea, vomiting, abdominal pain, altered bowel habits, dysuria, new bone pain or back pain, focal numbness or weakness.            Past Medical History:   Diagnosis Date    Acid reflux     Anxiety     Bipolar 1 disorder (HCC)     Bronchitis     Cirrhosis (HCC)     Coughing     Depression     DVT (deep vein thrombosis) in pregnancy     ETOH abuse     Gastritis     Hepatic encephalopathy (HCC)     Hepatitis     Joint pain     Leukocytosis     Lumbar disc disease     Shortness of breath     Stress     Teeth decayed     Vitamin D deficiency     Weakness of left leg        Allergies   Allergen Reactions    Aspirin Nausea and Vomiting       Past Surgical History:   Procedure Laterality Date    EGD      HAND/FINGER SURGERY UNLISTED      HX BACK SURGERY      HX OVARIAN CYST REMOVAL      HX SPLENECTOMY      IR INSERT TUNL CVC W PORT OVER 5 YEARS  12/8/2020       Social History     Socioeconomic History    Marital status:      Spouse name: Not on file    Number of children: Not on file    Years of education: Not on file    Highest education level: Not on file   Occupational History    Not on file   Tobacco Use    Smoking status: Current Every Day Smoker    Smokeless tobacco: Never Used   Substance and Sexual Activity    Alcohol use: Not Currently    Drug use: Yes     Types: Cocaine, Heroin    Sexual activity: Yes   Other Topics Concern    Not on file   Social History Narrative    Not on file     Social Determinants of Health     Financial Resource Strain:     Difficulty of Paying Living Expenses:    Food Insecurity:     Worried About [facility] in the Last Year:    [address] in the Last Year:    Transportation Needs:     Lack of Transportation (Medical):  Lack of Transportation (Non-Medical):    Physical Activity:     Days of Exercise per Week:     Minutes of Exercise per Session:    Stress:     Feeling of Stress :    Social Connections:     Frequency of Communication with Friends and Family:     Frequency of Social Gatherings with Friends and Family:     Attends Mosque Services:     Active Member of Clubs or Organizations:     Attends Club or Organization Meetings:     Marital Status:    Intimate Partner Violence:     Fear of Current or Ex-Partner:     Emotionally Abused:     Physically Abused:     Sexually Abused:        Family History   Problem Relation Age of Onset    Heart Disease Mother     Stroke Mother     Heart Disease Father     Heart Disease Brother     Hypertension Brother        Current Outpatient Medications   Medication Sig Dispense Refill    benzoyl peroxide-erythromycin (BENZAMYCIN) 3-5 % topical gel Apply  to affected area two (2) times a day. 46.6 g 0    Klor-Con M10 10 mEq tablet TAKE 1 TABLET BY MOUTH DAILY FOR 15 DAYS. 15 Tablet 0    furosemide (LASIX) 20 mg tablet TAKE 1 TABLET BY MOUTH EVERY DAY 15 Tablet 0    megestroL (MEGACE) 40 mg tablet TAKE 1 TABLET BY MOUTH EVERY DAY 30 Tablet 1    PAZOPanib (VOTRIENT) 200 mg tablet Take 3 Tablets by mouth daily. 90 Tablet 3    ondansetron (ZOFRAN ODT) 8 mg disintegrating tablet       Senna Laxative 8.6 mg tablet 1 2 TABS BY MOUTH EVERY NIGHT FOR CONSTIPATION      lidocaine-prilocaine (EMLA) topical cream Apply  to affected area as needed (30-60 minutes before port is to be accessed).  30 g 0    cyclobenzaprine (FLEXERIL) 5 mg tablet       mirtazapine (REMERON) 15 mg tablet TAKE 1 TABLET BY MOUTH EVERYDAY AT BEDTIME      silver sulfADIAZINE (SILVADENE) 1 % topical cream APPLY TO AFFECTED AREA 3 TIMES A DAY      diclofenac (VOLTAREN) 1 % gel APPLY 4 GM TO LEFT THIGH/BUTTOCKS 4 TIMES A DAY AS NEEDED FOR PAIN      ondansetron hcl (ZOFRAN) 4 mg tablet       QUEtiapine (SEROquel) 50 mg tablet TAKE 1 TABLET BY MOUTH AT BEDTIME      ARIPiprazole (ABILIFY) 5 mg tablet daily.  esomeprazole (NEXIUM) 20 mg capsule TAKE 20 MG BY MOUTH ONCE A DAY.  ADVAIR -21 mcg/actuation inhaler       hydrOXYzine HCl (ATARAX) 25 mg tablet       albuterol-ipratropium (DUO-NEB) 2.5 mg-0.5 mg/3 ml nebu PLEASE SEE ATTACHED FOR DETAILED DIRECTIONS  6    DAILY-GIORGIO tablet TAKE 1 TABLET BY MOUTH EVERY DAY      OLANZapine (ZYPREXA) 10 mg tablet       predniSONE (DELTASONE) 10 mg tablet Take 10 mg by mouth.  albuterol (PROVENTIL HFA, VENTOLIN HFA, PROAIR HFA) 90 mcg/actuation inhaler Take 2 Puffs by inhalation.  buprenorphine-naloxone (SUBOXONE) 8-2 mg film sublingaul film 1 Each by SubLINGual route.  SUBOXONE 8-2 mg film sublingaul film DISSOLVE 1 FILM UNDER THE TONGUE TWICE DAILY  0    buPROPion XL (WELLBUTRIN XL) 150 mg tablet TAKE 1 TABLET BY MOUTH EVERY DAY  3    busPIRone (BUSPAR) 7.5 mg tablet Take 7.5 mg by mouth.  ergocalciferol (ERGOCALCIFEROL) 50,000 unit capsule TAKE 1 CAP BY MOUTH EVERY FRIDAY. 1    folic acid (FOLVITE) 1 mg tablet Take  by mouth daily.  hydrOXYzine pamoate (VISTARIL) 25 mg capsule Take 25 mg by mouth three (3) times daily as needed for Itching.  albuterol 2.5 mg /3 mL (0.083 %) nebu 3 mL, albuterol-ipratropium 2.5 mg-0.5 mg/3 ml nebu 3 mL 1 Dose by Nebulization route.  lactulose (KRISTALOSE) 10 gram packet Take 10 g by mouth three (3) times daily.  montelukast (SINGULAIR) 10 mg tablet Take 10 mg by mouth daily.  naproxen (NAPROSYN) 500 mg tablet Take 500 mg by mouth two (2) times daily (with meals).  omeprazole (PRILOSEC) 40 mg capsule Take 40 mg by mouth daily.  spironolactone (ALDACTONE) 25 mg tablet Take  by mouth daily.       thiamine HCL (VITAMIN B-1) 100 mg tablet Take  by mouth daily.  tiotropium (SPIRIVA WITH HANDIHALER) 18 mcg inhalation capsule Take 1 Cap by inhalation daily. Review of Systems   Constitutional: Positive for malaise/fatigue. Negative for chills, diaphoresis, fever and weight loss. Respiratory: Positive for shortness of breath (chronic SOB). Negative for cough, hemoptysis and wheezing. Cardiovascular: Negative for chest pain, palpitations and leg swelling. Gastrointestinal: Negative for abdominal pain, diarrhea, heartburn, nausea and vomiting. Genitourinary: Negative for dysuria, frequency, hematuria and urgency. Musculoskeletal: Negative for joint pain and myalgias. Skin: Negative for itching and rash. Neurological: Negative for dizziness, seizures, weakness and headaches. Psychiatric/Behavioral: Negative for depression. The patient does not have insomnia. Objective:     Visit Vitals  /83   Pulse (!) 117   Temp 97.9 °F (36.6 °C)   Ht 5' 5\" (1.651 m)   Wt 83.2 kg (183 lb 6.4 oz)   SpO2 98%   BMI 30.52 kg/m²       ECOG Performance Status (grade): 1- 2  0 - able to carry on all pre-disease activity w/out restriction  1 - restricted but able to carry out light work  2 - ambulatory and can self- care but unable to carry out work  3 - bed or chair >50% of waking hours  4 - completely disable, total care, confined to bed or chair    Physical Exam  Constitutional:       Appearance: Normal appearance. Comments: On chronic oxygen   HENT:      Head: Normocephalic and atraumatic. Eyes:      Pupils: Pupils are equal, round, and reactive to light. Cardiovascular:      Rate and Rhythm: Normal rate and regular rhythm. Heart sounds: Normal heart sounds. Pulmonary:      Effort: Pulmonary effort is normal.      Breath sounds: Normal breath sounds. Abdominal:      General: Bowel sounds are normal.      Palpations: Abdomen is soft. Tenderness: There is no abdominal tenderness. There is no guarding. Musculoskeletal:         General: Normal range of motion. Cervical back: Neck supple. Right lower leg: Edema present. Left lower leg: Edema present. Skin:     General: Skin is warm. Neurological:      General: No focal deficit present. Mental Status: She is alert and oriented to person, place, and time. Mental status is at baseline. Diagnostics:      No results found for this or any previous visit (from the past 96 hour(s)). Imaging:  Results for orders placed in visit on 10/27/20    IR INSERT TUNL CVC W PORT OVER 5 YEARS      No results found for this or any previous visit. Results for orders placed in visit on 04/02/21    CT CHEST ABD PELV W WO CONT               Assessment:   # Soft tissue mass, left gluteal area:   # Undifferentiated pleomorphic sarcoma  # Lung metastases  # Bone pain, Neulasta-related    Plan:   # Undifferentiated pleomorphic sarcoma  # Lung metastases  # Soft tissue mass, left gluteal area:   -- In February 2019 she had an ultrasound of the area and there was no evidence of a cystic component the area was described as heterogeneous measuring about 6.8 x 4.85 8.5 cm. An attempt to aspirate fluid from the left posterior hip/gluteal mass was unsuccessful. More recently she states that the mass has actually doubled in size and is now about the size of a softball.   -- 4/5/2019. Liver, needle biopsy: mild to moderate chronic hepatitis (NOVA and BELLA score 2-3) with bridging fibrosis (nova and bella score 3); see comment. -- 11/20/2019. Lumbar mass, core biopsies: Soft tissue tumor with features consistent with sarcoma. 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 Necrosis: present. Histologic grade (fnclcc): grade 3.  -- 1/31/2020: CT C/A/P: Significant interval increase in size of the mass within the posterior left buttock which now measures 14.1 cm, compared to 5.1 cm on prior examination.  A new indeterminant moderate compression fracture is present in the T5 vertebral body. Mild scarring is present in the lingula. -- S.p Neoadjuvant XRT. (Katlyn Young)  -- 6/11/2020 Left gluteal sarcoma resection - Dr. Julia Kc at [facility] orthopedic oncology department. Preliminary report faxed to us which stated pleomorphic sarcoma s/p resection. She was planned to obtain chest CT in 2 months for interval assessment of lung lesion. -- Her recent CT at [facility] reported metastatic lung lesions. She was seen at [facility] recently and suggested palliative chemotherapy. The patient would like to have therapy at [facility] closer to her house. -- 12/08/2020 s.p Port placement. -- 12/10/2020 CT of chest/A/P: Large left gluteal mass present on prior exam is no longer present. Residual soft tissue and skin changes likely represent scarring. Innumerable new bilateral pulmonary nodules consistent with metastatic disease. There may also be pleural metastasis along the right major fissure inferiorly. Single borderline abnormal left periaortic lymph node unchanged. No new abnormal adenopathy. Stable T5 compression fracture. No new bony abnormalities. -- The patient would like to start her chemotherapy after holidays. -- NGS tumor profiling, liquid biopsy obtained. -- 1/8/2021 C1 D1 Gemcitabine   -- 1/15/2021 C1 D8 Gemcitabine  -- 1/29/2021 C2 D1 Gemcitabine  -- 2/5/2021 C2 D8 Gemcitabine was held d/t low ANC  -- 2/19/2021 C3 D1  -- 3/12/2021 C4 D1  -- 4/2/2021 C5 D1   -- She has tolerated chemotherapy fairly well with no high graded toxicities. -- 4/22/2021 CT Chest/A/P reported many of the pulmonary nodules are significantly decreased are no longer present, some of the pulmonary nodules have significantly increased in size since prior examination. This is compatible with a mixed response.  -- 5/28/2021 She has started Pazopanib, dose reducing at 600 mg oral daily. -- 6/25/2021 C9 D1 Gemcitabine was deferred d/t low platelets.   PLT 30K.   -- 7/30/2021 CBC reported hemoglobin 10.5, hematocrit 32.8%, WBC 2.8, ANC 0.8, platelet 167,601. Gemcitabine was deferred d/t neutropenia. Her Pazopanib was held due to skin blisters. -- Today the patient reported better. Her skin lesions has resolved with steroids. The patient declined further Pazopanib d/t fatigue and skin side effects. We have discussed about d/c Gemcitabine and start her on palliative chemotherapy Trabectedin every 21 days (3 weeks), until disease progression or until unacceptable toxicity. We have discussed about potential risk/side effects, benefits, and alternatives. The patient was agreeable with the plan. Plan:  -- D/c Gemcitabine/Pazopanib  -- Will obtain interval scan CT C/A/P  -- Plan to start on palliative chemotherapy Trabectedin 1.5 mg/m2 every 21 days (3 weeks), until disease progression or until unacceptable toxicity. -- IV fluid supportively with chemotherapy. -- Monitor labs: CBC, CMP. -- We will see the patient back to the clinic in 2 weeks. Always sooner if required. # Skin blisters, improving  -- Concerned Pazopanib side effects  -- Improved with short term Prednisone PO and Erythromycin topical.      # Thrombocytopenia  -- Likely chemotherapy-related  -- Hold therapy as above  -- Monitor CBC, bleeding      # B/L LE swelling  -- 5/5/2021 Venous doppler reported no DVT demonstrated bilateral lower extremity. -- Stable      # Chronic Anemia  # Anemia, chemotherapy-related  -- She has received PRBCs supportively. -- Will monitor  iron profile, ferritin, b12/folate and replacement accordingly  -- Will consider Procrit if steadily declining H/H      # Poor appetite  -- Ensure BID  -- Advised to keep hydrated. -- Add Megace. Pt refused Nutritionist consult.       # Generalized bone pain, likely Neulasta related  -- Advised to take antihistamines such as Claritin or Benadryl or NSAIDs      Orders Placed This Encounter    CT CHEST ABD PELV W CONT     Standing Status: Future     Standing Expiration Date:   9/18/2022     Order Specific Question:   STAT Creatinine as indicated     Answer:   Yes     Order Specific Question: This order utilizes IV contrast.  What additional contrast is needed? Answer:   Per Radiologist Protocol           Ms. Germán Salgado has a reminder for a \"due or due soon\" health maintenance. I have asked that she contact her primary care provider for follow-up on this health maintenance. All of patient's questions answered to their apparent satisfaction. They verbally show understanding and agreement with aforementioned plan. Hollie Singh MD  8/13/2021        Parts of this document has been produced using Dragon dictation system. Unrecognized errors in transcription may be present. Please do not hesitate to reach out for any questions or clarifications.       CC: Andrea Finney MD

## 2021-08-27 NOTE — PROGRESS NOTES
Hematology/Oncology Note    Name: Josefina Burnett  Date: 2021  : 1955    Alden Perez MD       Oncology History:  Ms. Suzy Morales  is a 74 y.o. -American woman who has follow-up for her left gluteal mass. -- In 2019 she had an ultrasound of the area and there was no evidence of a cystic component the area was described as heterogeneous measuring about 6.8 x 4.85 8.5 cm. An attempt to aspirate fluid from the left posterior hip/gluteal mass was unsuccessful. More recently she states that the mass has actually doubled in size and is now about the size of a softball.   -- 2019. Liver, needle biopsy: mild to moderate chronic hepatitis (NOVA and MATI score 2-3) with bridging fibrosis (nova and mati score 3); see comment. -- 2019. Lumbar mass, core biopsies: Soft tissue tumor with features consistent with sarcoma. 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 Necrosis: present. Histologic grade (fnclcc): grade 3.  -- 2020: CT C/A/P: Significant interval increase in size of the mass within the posterior left buttock which now measures 14.1 cm, compared to 5.1 cm on prior examination. A new indeterminant moderate compression fracture is present in the T5 vertebral body.  -- S.p Neoadjuvant XRT. (Vivian Pacheco)  -- 2020 Left gluteal sarcoma resection  -- CT at [facility] reported metastatic lung lesions. She was seen at [address] [place] [address] recently and suggested palliative chemotherapy. The patient would like to have therapy at [facility] closer to her house. -- 2020 s.p Port placement. -- 12/10/2020 CT of chest/A/P: Large left gluteal mass present on prior exam is no longer present. Residual soft tissue and skin changes likely represent scarring. Innumerable new bilateral pulmonary nodules consistent with metastatic disease. There may also be pleural metastasis along the right major fissure inferiorly. Single borderline abnormal left periaortic lymph node unchanged. No new abnormal adenopathy. Stable T5 compression fracture. No new bony abnormalities. -- NGS tumor profiling, liquid biopsy obtained. -- 1/8/2021 C1 D1 Gemcitabine   -- 1/15/2021 C1 D8 Gemcitabine  -- 1/29/2021 C2 D1 Gemcitabine  -- 2/5/2021 C2 D8 Gemcitabine was held d/t low ANC  -- 2/19/2021 C3 D1  -- 3/12/2021 C4 D1  -- 4/2/2021 C5 D1   -- She has tolerated chemotherapy fairly well with no high graded toxicities. -- 4/22/2021 CT Chest/A/P reported many of the pulmonary nodules are significantly decreased are no longer present, some of the pulmonary nodules have significantly increased in size since prior examination. This is compatible with a mixed response.  -- 5/28/2021 She has started Pazopanib, dose reducing at 600 mg oral daily. -- 6/25/2021 C9 D1 was deferred d/t low platelets. PLT 30K. Subjective:   Chief complaint: Gluteal mass, sarcoma    History of present illness:  Ms. Michael Looney is a 77-year-old -American woman who states that about a year ago she noticed a small masslike lesion over her left gluteal area. She did not think much of it. However over the past few months it has started to enlarge in size. She had mass biopsied which pathology reported sarcoma,undifferentiated pleomorphic. She had completed neoadjuvant radiation therapy. Subsequently she underwent resection on 6/11/2020 by Dr. Katerine Conley. Her CT at [address] [place] [address] reported metastatic lung lesions. She was seen at [address] [place] [address] and suggested palliative chemotherapy. The patient would like to have therapy at [facility] closer to her house. She has chronic SOB from long-standing COPD. She is oxygen dependent. She has started  C1D1 Gemcitabine on 1/8/2021. Since 5/28/2021 She has started Pazopanib, dose reducing at 600 mg oral daily. Today she presented here for follow up. She was accompanied by her family today. She reported feeling better today. She reported her breathing was stable.    She reported skin blisters over left hand and right toe have improved with steroids and creams. She denied any new complaints. Denied fever, chills, night sweat, unintentional weight loss, skin lumps or bumps, acute bleeding or bruising issues. Denied headache, acute vision change, dizziness, chest pain, palpitation, productive cough, nausea, vomiting, abdominal pain, altered bowel habits, dysuria, new bone pain or back pain, focal numbness or weakness.            Past Medical History:   Diagnosis Date    Acid reflux     Anxiety     Bipolar 1 disorder (HCC)     Bronchitis     Cirrhosis (HCC)     Coughing     Depression     DVT (deep vein thrombosis) in pregnancy     ETOH abuse     Gastritis     Hepatic encephalopathy (HCC)     Hepatitis     Joint pain     Leukocytosis     Lumbar disc disease     Shortness of breath     Stress     Teeth decayed     Vitamin D deficiency     Weakness of left leg        Allergies   Allergen Reactions    Aspirin Nausea and Vomiting       Past Surgical History:   Procedure Laterality Date    EGD      HAND/FINGER SURGERY UNLISTED      HX BACK SURGERY      HX OVARIAN CYST REMOVAL      HX SPLENECTOMY      IR INSERT TUNL CVC W PORT OVER 5 YEARS  12/8/2020       Social History     Socioeconomic History    Marital status:      Spouse name: Not on file    Number of children: Not on file    Years of education: Not on file    Highest education level: Not on file   Occupational History    Not on file   Tobacco Use    Smoking status: Current Every Day Smoker    Smokeless tobacco: Never Used   Substance and Sexual Activity    Alcohol use: Not Currently    Drug use: Yes     Types: Cocaine, Heroin    Sexual activity: Yes   Other Topics Concern    Not on file   Social History Narrative    Not on file     Social Determinants of Health     Financial Resource Strain:     Difficulty of Paying Living Expenses:    Food Insecurity:     Worried About Running Out of Food in the Last Year:     Narinder cotsa Food in the Last Year:    Transportation Needs:     Lack of Transportation (Medical):  Lack of Transportation (Non-Medical):    Physical Activity:     Days of Exercise per Week:     Minutes of Exercise per Session:    Stress:     Feeling of Stress :    Social Connections:     Frequency of Communication with Friends and Family:     Frequency of Social Gatherings with Friends and Family:     Attends [employer] Services:     Active Member of Clubs or Organizations:     Attends Club or Organization Meetings:     Marital Status:    Intimate Partner Violence:     Fear of Current or Ex-Partner:     Emotionally Abused:     Physically Abused:     Sexually Abused:        Family History   Problem Relation Age of Onset    Heart Disease Mother     Stroke Mother     Heart Disease Father     Heart Disease Brother     Hypertension Brother        Current Outpatient Medications   Medication Sig Dispense Refill    megestroL (MEGACE) 40 mg tablet TAKE 1 TABLET BY MOUTH EVERY DAY 30 Tablet 1    benzoyl peroxide-erythromycin (BENZAMYCIN) 3-5 % topical gel Apply  to affected area two (2) times a day. 46.6 g 0    Klor-Con M10 10 mEq tablet TAKE 1 TABLET BY MOUTH DAILY FOR 15 DAYS. 15 Tablet 0    furosemide (LASIX) 20 mg tablet TAKE 1 TABLET BY MOUTH EVERY DAY 15 Tablet 0    PAZOPanib (VOTRIENT) 200 mg tablet Take 3 Tablets by mouth daily. 90 Tablet 3    ondansetron (ZOFRAN ODT) 8 mg disintegrating tablet       Senna Laxative 8.6 mg tablet 1 2 TABS BY MOUTH EVERY NIGHT FOR CONSTIPATION      lidocaine-prilocaine (EMLA) topical cream Apply  to affected area as needed (30-60 minutes before port is to be accessed).  30 g 0    cyclobenzaprine (FLEXERIL) 5 mg tablet       mirtazapine (REMERON) 15 mg tablet TAKE 1 TABLET BY MOUTH EVERYDAY AT BEDTIME      silver sulfADIAZINE (SILVADENE) 1 % topical cream APPLY TO AFFECTED AREA 3 TIMES A DAY      diclofenac (VOLTAREN) 1 % gel APPLY 4 GM TO LEFT THIGH/BUTTOCKS 4 TIMES A DAY AS NEEDED FOR PAIN      ondansetron hcl (ZOFRAN) 4 mg tablet       QUEtiapine (SEROquel) 50 mg tablet TAKE 1 TABLET BY MOUTH AT BEDTIME      ARIPiprazole (ABILIFY) 5 mg tablet daily.  esomeprazole (NEXIUM) 20 mg capsule TAKE 20 MG BY MOUTH ONCE A DAY.  ADVAIR -21 mcg/actuation inhaler       hydrOXYzine HCl (ATARAX) 25 mg tablet       albuterol-ipratropium (DUO-NEB) 2.5 mg-0.5 mg/3 ml nebu PLEASE SEE ATTACHED FOR DETAILED DIRECTIONS  6    DAILY-GIORGIO tablet TAKE 1 TABLET BY MOUTH EVERY DAY      OLANZapine (ZYPREXA) 10 mg tablet       predniSONE (DELTASONE) 10 mg tablet Take 10 mg by mouth.  albuterol (PROVENTIL HFA, VENTOLIN HFA, PROAIR HFA) 90 mcg/actuation inhaler Take 2 Puffs by inhalation.  buprenorphine-naloxone (SUBOXONE) 8-2 mg film sublingaul film 1 Each by SubLINGual route.  SUBOXONE 8-2 mg film sublingaul film DISSOLVE 1 FILM UNDER THE TONGUE TWICE DAILY  0    buPROPion XL (WELLBUTRIN XL) 150 mg tablet TAKE 1 TABLET BY MOUTH EVERY DAY  3    busPIRone (BUSPAR) 7.5 mg tablet Take 7.5 mg by mouth.  ergocalciferol (ERGOCALCIFEROL) 50,000 unit capsule TAKE 1 CAP BY MOUTH EVERY FRIDAY. 1    folic acid (FOLVITE) 1 mg tablet Take  by mouth daily.  hydrOXYzine pamoate (VISTARIL) 25 mg capsule Take 25 mg by mouth three (3) times daily as needed for Itching.  albuterol 2.5 mg /3 mL (0.083 %) nebu 3 mL, albuterol-ipratropium 2.5 mg-0.5 mg/3 ml nebu 3 mL 1 Dose by Nebulization route.  lactulose (KRISTALOSE) 10 gram packet Take 10 g by mouth three (3) times daily.  montelukast (SINGULAIR) 10 mg tablet Take 10 mg by mouth daily.  naproxen (NAPROSYN) 500 mg tablet Take 500 mg by mouth two (2) times daily (with meals).  omeprazole (PRILOSEC) 40 mg capsule Take 40 mg by mouth daily.  spironolactone (ALDACTONE) 25 mg tablet Take  by mouth daily.  thiamine HCL (VITAMIN B-1) 100 mg tablet Take  by mouth daily.       tiotropium (SPIRIVA WITH HANDIHALER) 18 mcg inhalation capsule Take 1 Cap by inhalation daily. Review of Systems   Constitutional: Positive for malaise/fatigue. Negative for chills, diaphoresis, fever and weight loss. Respiratory: Positive for shortness of breath (chronic SOB). Negative for cough, hemoptysis and wheezing. Cardiovascular: Negative for chest pain, palpitations and leg swelling. Gastrointestinal: Negative for abdominal pain, diarrhea, heartburn, nausea and vomiting. Genitourinary: Negative for dysuria, frequency, hematuria and urgency. Musculoskeletal: Negative for joint pain and myalgias. Skin: Negative for itching and rash. Neurological: Negative for dizziness, seizures, weakness and headaches. Psychiatric/Behavioral: Negative for depression. The patient does not have insomnia. Objective: There were no vitals taken for this visit. ECOG Performance Status (grade): 1- 2  0 - able to carry on all pre-disease activity w/out restriction  1 - restricted but able to carry out light work  2 - ambulatory and can self- care but unable to carry out work  3 - bed or chair >50% of waking hours  4 - completely disable, total care, confined to bed or chair    Physical Exam  Constitutional:       Appearance: Normal appearance. Comments: On chronic oxygen   HENT:      Head: Normocephalic and atraumatic. Eyes:      Pupils: Pupils are equal, round, and reactive to light. Cardiovascular:      Rate and Rhythm: Normal rate and regular rhythm. Heart sounds: Normal heart sounds. Pulmonary:      Effort: Pulmonary effort is normal.      Breath sounds: Normal breath sounds. Abdominal:      General: Bowel sounds are normal.      Palpations: Abdomen is soft. Tenderness: There is no abdominal tenderness. There is no guarding. Musculoskeletal:         General: Normal range of motion. Cervical back: Neck supple. Right lower leg: Edema present.       Left lower leg: Edema present. Skin:     General: Skin is warm. Neurological:      General: No focal deficit present. Mental Status: She is alert and oriented to person, place, and time. Mental status is at baseline. Diagnostics:      No results found for this or any previous visit (from the past 96 hour(s)). Imaging:  Results for orders placed in visit on 10/27/20    IR INSERT TUNL CVC W PORT OVER 5 YEARS      No results found for this or any previous visit. Results for orders placed in visit on 04/02/21    CT CHEST ABD PELV W WO CONT               Assessment:   # Soft tissue mass, left gluteal area:   # Undifferentiated pleomorphic sarcoma  # Lung metastases  # Bone pain, Neulasta-related    Plan:   # Undifferentiated pleomorphic sarcoma  # Lung metastases  # Soft tissue mass, left gluteal area:   -- In February 2019 she had an ultrasound of the area and there was no evidence of a cystic component the area was described as heterogeneous measuring about 6.8 x 4.85 8.5 cm. An attempt to aspirate fluid from the left posterior hip/gluteal mass was unsuccessful. More recently she states that the mass has actually doubled in size and is now about the size of a softball.   -- 4/5/2019. Liver, needle biopsy: mild to moderate chronic hepatitis (NOVA and MATI score 2-3) with bridging fibrosis (nova and mati score 3); see comment. -- 11/20/2019. Lumbar mass, core biopsies: Soft tissue tumor with features consistent with sarcoma. 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 Necrosis: present. Histologic grade (fnclcc): grade 3.  -- 1/31/2020: CT C/A/P: Significant interval increase in size of the mass within the posterior left buttock which now measures 14.1 cm, compared to 5.1 cm on prior examination. A new indeterminant moderate compression fracture is present in the T5 vertebral body. Mild scarring is present in the lingula.   -- S.p Neoadjuvant XRT. (Fabiola Mt)  -- 6/11/2020 Left gluteal sarcoma resection - Dr. Win Page at [address] [place] [address] orthopedic oncology department. Preliminary report faxed to us which stated pleomorphic sarcoma s/p resection. She was planned to obtain chest CT in 2 months for interval assessment of lung lesion. -- Her recent CT at [address] [place] [address] reported metastatic lung lesions. She was seen at [address] [place] [address] recently and suggested palliative chemotherapy. The patient would like to have therapy at [facility] closer to her house. -- 12/08/2020 s.p Port placement. -- 12/10/2020 CT of chest/A/P: Large left gluteal mass present on prior exam is no longer present. Residual soft tissue and skin changes likely represent scarring. Innumerable new bilateral pulmonary nodules consistent with metastatic disease. There may also be pleural metastasis along the right major fissure inferiorly. Single borderline abnormal left periaortic lymph node unchanged. No new abnormal adenopathy. Stable T5 compression fracture. No new bony abnormalities. -- The patient would like to start her chemotherapy after holidays. -- NGS tumor profiling, liquid biopsy obtained. -- 1/8/2021 C1 D1 Gemcitabine   -- 1/15/2021 C1 D8 Gemcitabine  -- 1/29/2021 C2 D1 Gemcitabine  -- 2/5/2021 C2 D8 Gemcitabine was held d/t low ANC  -- 2/19/2021 C3 D1  -- 3/12/2021 C4 D1  -- 4/2/2021 C5 D1   -- She has tolerated chemotherapy fairly well with no high graded toxicities. -- 4/22/2021 CT Chest/A/P reported many of the pulmonary nodules are significantly decreased are no longer present, some of the pulmonary nodules have significantly increased in size since prior examination. This is compatible with a mixed response.  -- 5/28/2021 She has started Pazopanib, dose reducing at 600 mg oral daily. -- 6/25/2021 C9 D1 Gemcitabine was deferred d/t low platelets. PLT 30K.   -- 7/30/2021 CBC reported hemoglobin 10.5, hematocrit 32.8%, WBC 2.8, ANC 0.8, platelet 838,102. Gemcitabine was deferred d/t neutropenia. Her Pazopanib was held due to skin blisters.   -- Gemcitabine and Pazopanib was d/c due to intolerance. -- Today the patient reported better. Her skin lesions has resolved with steroids. The patient was agreeable to start on palliative chemotherapy Trabectedin every 21 days (3 weeks), until disease progression or until unacceptable toxicity. We have discussed about potential risk/side effects, benefits, and alternatives. The patient was agreeable with the plan. Plan:  -- Pending MUGA, CT CAP  -- Plan to start on palliative chemotherapy Trabectedin 1.5 mg/m2 every 21 days (3 weeks), in 1-2 weeks tentatively. -- IV fluid supportively with chemotherapy. -- Monitor labs: CBC, CMP. -- We will see the patient back to the clinic in 4 weeks. Always sooner if required. # Skin blisters, resolved  -- Concerned Pazopanib side effects  -- Improved with short term Prednisone PO and Erythromycin topical.      # Thrombocytopenia  -- Likely chemotherapy-related  -- Hold therapy as above  -- Monitor CBC, bleeding      # B/L LE swelling  -- 5/5/2021 Venous doppler reported no DVT demonstrated bilateral lower extremity. -- Stable      # Chronic Anemia  # Anemia, chemotherapy-related  -- She has received PRBCs supportively. -- Will monitor  iron profile, ferritin, b12/folate and replacement accordingly  -- Will consider Procrit if steadily declining H/H      # Poor appetite  -- Ensure BID  -- Advised to keep hydrated. -- Add Megace. Pt refused Nutritionist consult. # Generalized bone pain, likely Neulasta related  -- Advised to take antihistamines such as Claritin or Benadryl or NSAIDs      No orders of the defined types were placed in this encounter. Ms. Aurea Nelson has a reminder for a \"due or due soon\" health maintenance. I have asked that she contact her primary care provider for follow-up on this health maintenance. All of patient's questions answered to their apparent satisfaction.  They verbally show understanding and agreement with aforementioned Moira Noel Do, MD  8/27/2021        Parts of this document has been produced using Dragon dictation system. Unrecognized errors in transcription may be present. Please do not hesitate to reach out for any questions or clarifications.       CC: Reena Montes De Oca MD

## 2021-10-08 ENCOUNTER — APPOINTMENT (OUTPATIENT)
Dept: INFUSION THERAPY | Age: 66
End: 2021-10-08

## 2021-10-11 ENCOUNTER — APPOINTMENT (OUTPATIENT)
Dept: INFUSION THERAPY | Age: 66
End: 2021-10-11

## 2021-10-12 ENCOUNTER — APPOINTMENT (OUTPATIENT)
Dept: INFUSION THERAPY | Age: 66
End: 2021-10-12
